# Patient Record
Sex: FEMALE | Race: WHITE | Employment: OTHER | URBAN - METROPOLITAN AREA
[De-identification: names, ages, dates, MRNs, and addresses within clinical notes are randomized per-mention and may not be internally consistent; named-entity substitution may affect disease eponyms.]

---

## 2018-04-04 ENCOUNTER — HOSPITAL ENCOUNTER (EMERGENCY)
Age: 83
Discharge: HOME | End: 2018-04-04
Attending: EMERGENCY MEDICINE
Payer: MEDICARE

## 2018-04-04 ENCOUNTER — HOSPITAL ENCOUNTER (EMERGENCY)
Age: 83
Discharge: HOME | End: 2018-04-04
Payer: MEDICARE

## 2018-04-04 ENCOUNTER — APPOINTMENT (OUTPATIENT)
Dept: GENERAL RADIOLOGY | Age: 83
End: 2018-04-04
Payer: MEDICARE

## 2018-04-04 VITALS
HEART RATE: 62 BPM | DIASTOLIC BLOOD PRESSURE: 65 MMHG | SYSTOLIC BLOOD PRESSURE: 158 MMHG | TEMPERATURE: 97.8 F | OXYGEN SATURATION: 94 % | RESPIRATION RATE: 20 BRPM

## 2018-04-04 VITALS
WEIGHT: 124 LBS | SYSTOLIC BLOOD PRESSURE: 144 MMHG | BODY MASS INDEX: 19.93 KG/M2 | HEIGHT: 66 IN | DIASTOLIC BLOOD PRESSURE: 77 MMHG | TEMPERATURE: 98.4 F | HEART RATE: 56 BPM | OXYGEN SATURATION: 92 % | RESPIRATION RATE: 17 BRPM

## 2018-04-04 DIAGNOSIS — R07.9 CHEST PAIN, UNSPECIFIED TYPE: Primary | ICD-10-CM

## 2018-04-04 DIAGNOSIS — R53.83 FATIGUE, UNSPECIFIED TYPE: ICD-10-CM

## 2018-04-04 LAB
ALBUMIN SERPL-MCNC: 3.9 G/DL (ref 3.5–5.2)
ALP BLD-CCNC: 77 U/L (ref 35–104)
ALT SERPL-CCNC: 13 U/L (ref 0–32)
ANION GAP SERPL CALCULATED.3IONS-SCNC: 10 MMOL/L (ref 7–16)
AST SERPL-CCNC: 15 U/L (ref 0–31)
BACTERIA: NORMAL /HPF
BASOPHILS ABSOLUTE: 0.02 E9/L (ref 0–0.2)
BASOPHILS RELATIVE PERCENT: 0.3 % (ref 0–2)
BILIRUB SERPL-MCNC: 0.3 MG/DL (ref 0–1.2)
BILIRUBIN URINE: NEGATIVE
BLOOD, URINE: NEGATIVE
BUN BLDV-MCNC: 13 MG/DL (ref 8–23)
CALCIUM SERPL-MCNC: 9.2 MG/DL (ref 8.6–10.2)
CHLORIDE BLD-SCNC: 103 MMOL/L (ref 98–107)
CLARITY: CLEAR
CO2: 28 MMOL/L (ref 22–29)
COLOR: YELLOW
CREAT SERPL-MCNC: 0.6 MG/DL (ref 0.5–1)
EKG ATRIAL RATE: 65 BPM
EKG P AXIS: 74 DEGREES
EKG P-R INTERVAL: 138 MS
EKG Q-T INTERVAL: 430 MS
EKG QRS DURATION: 88 MS
EKG QTC CALCULATION (BAZETT): 447 MS
EKG R AXIS: 87 DEGREES
EKG T AXIS: 77 DEGREES
EKG VENTRICULAR RATE: 65 BPM
EOSINOPHILS ABSOLUTE: 0.14 E9/L (ref 0.05–0.5)
EOSINOPHILS RELATIVE PERCENT: 2.4 % (ref 0–6)
GFR AFRICAN AMERICAN: >60
GFR NON-AFRICAN AMERICAN: >60 ML/MIN/1.73
GLUCOSE BLD-MCNC: 79 MG/DL (ref 74–109)
GLUCOSE URINE: NEGATIVE MG/DL
HCT VFR BLD CALC: 40.7 % (ref 34–48)
HEMOGLOBIN: 13.1 G/DL (ref 11.5–15.5)
IMMATURE GRANULOCYTES #: 0.01 E9/L
IMMATURE GRANULOCYTES %: 0.2 % (ref 0–5)
KETONES, URINE: NEGATIVE MG/DL
LEUKOCYTE ESTERASE, URINE: ABNORMAL
LYMPHOCYTES ABSOLUTE: 1.3 E9/L (ref 1.5–4)
LYMPHOCYTES RELATIVE PERCENT: 22 % (ref 20–42)
MCH RBC QN AUTO: 28.8 PG (ref 26–35)
MCHC RBC AUTO-ENTMCNC: 32.2 % (ref 32–34.5)
MCV RBC AUTO: 89.5 FL (ref 80–99.9)
MONOCYTES ABSOLUTE: 0.45 E9/L (ref 0.1–0.95)
MONOCYTES RELATIVE PERCENT: 7.6 % (ref 2–12)
NEUTROPHILS ABSOLUTE: 3.98 E9/L (ref 1.8–7.3)
NEUTROPHILS RELATIVE PERCENT: 67.5 % (ref 43–80)
NITRITE, URINE: NEGATIVE
PDW BLD-RTO: 13.5 FL (ref 11.5–15)
PH UA: 7 (ref 5–9)
PLATELET # BLD: 148 E9/L (ref 130–450)
PMV BLD AUTO: 11.1 FL (ref 7–12)
POTASSIUM SERPL-SCNC: 4.2 MMOL/L (ref 3.5–5)
PROTEIN UA: NEGATIVE MG/DL
RBC # BLD: 4.55 E12/L (ref 3.5–5.5)
RBC UA: NORMAL /HPF (ref 0–2)
SODIUM BLD-SCNC: 141 MMOL/L (ref 132–146)
SPECIFIC GRAVITY UA: 1.01 (ref 1–1.03)
TOTAL PROTEIN: 6.5 G/DL (ref 6.4–8.3)
TROPONIN: <0.01 NG/ML (ref 0–0.03)
UROBILINOGEN, URINE: 0.2 E.U./DL
WBC # BLD: 5.9 E9/L (ref 4.5–11.5)
WBC UA: NORMAL /HPF (ref 0–5)

## 2018-04-04 PROCEDURE — 81001 URINALYSIS AUTO W/SCOPE: CPT

## 2018-04-04 PROCEDURE — 99212 OFFICE O/P EST SF 10 MIN: CPT

## 2018-04-04 PROCEDURE — 80053 COMPREHEN METABOLIC PANEL: CPT

## 2018-04-04 PROCEDURE — 71046 X-RAY EXAM CHEST 2 VIEWS: CPT

## 2018-04-04 PROCEDURE — 84484 ASSAY OF TROPONIN QUANT: CPT

## 2018-04-04 PROCEDURE — 99285 EMERGENCY DEPT VISIT HI MDM: CPT

## 2018-04-04 PROCEDURE — 36415 COLL VENOUS BLD VENIPUNCTURE: CPT

## 2018-04-04 PROCEDURE — 85025 COMPLETE CBC W/AUTO DIFF WBC: CPT

## 2018-04-04 PROCEDURE — 6370000000 HC RX 637 (ALT 250 FOR IP): Performed by: EMERGENCY MEDICINE

## 2018-04-04 RX ORDER — MAGNESIUM OXIDE 400 MG/1
400 TABLET ORAL DAILY
COMMUNITY

## 2018-04-04 RX ORDER — ASPIRIN 81 MG/1
324 TABLET, CHEWABLE ORAL ONCE
Status: COMPLETED | OUTPATIENT
Start: 2018-04-04 | End: 2018-04-04

## 2018-04-04 RX ORDER — NITROGLYCERIN 0.4 MG/1
0.4 TABLET SUBLINGUAL EVERY 5 MIN PRN
Status: DISCONTINUED | OUTPATIENT
Start: 2018-04-04 | End: 2018-04-04

## 2018-04-04 RX ADMIN — ASPIRIN 81 MG 324 MG: 81 TABLET ORAL at 16:02

## 2018-04-04 ASSESSMENT — PAIN SCALES - GENERAL
PAINLEVEL_OUTOF10: 5
PAINLEVEL_OUTOF10: 5

## 2018-04-04 ASSESSMENT — PAIN DESCRIPTION - DESCRIPTORS
DESCRIPTORS: HEAVINESS;PRESSURE
DESCRIPTORS: DISCOMFORT;TIGHTNESS

## 2018-04-04 ASSESSMENT — PAIN DESCRIPTION - LOCATION
LOCATION: CHEST
LOCATION: CHEST

## 2018-04-04 ASSESSMENT — ENCOUNTER SYMPTOMS
TROUBLE SWALLOWING: 0
SHORTNESS OF BREATH: 0
RHINORRHEA: 0
CHEST TIGHTNESS: 0
CONSTIPATION: 0
ABDOMINAL PAIN: 0
SORE THROAT: 0
BLOOD IN STOOL: 0
DIARRHEA: 1
NAUSEA: 0
COUGH: 0
WHEEZING: 0
VOMITING: 0

## 2018-04-04 ASSESSMENT — PAIN DESCRIPTION - PAIN TYPE
TYPE: ACUTE PAIN
TYPE: ACUTE PAIN

## 2018-04-04 ASSESSMENT — PAIN DESCRIPTION - FREQUENCY: FREQUENCY: CONTINUOUS

## 2018-04-04 ASSESSMENT — PAIN DESCRIPTION - PROGRESSION: CLINICAL_PROGRESSION: GRADUALLY WORSENING

## 2018-06-06 ENCOUNTER — OFFICE VISIT (OUTPATIENT)
Dept: FAMILY MEDICINE CLINIC | Age: 83
End: 2018-06-06
Payer: MEDICARE

## 2018-06-06 VITALS
OXYGEN SATURATION: 97 % | HEIGHT: 66 IN | BODY MASS INDEX: 20.03 KG/M2 | TEMPERATURE: 97.8 F | DIASTOLIC BLOOD PRESSURE: 76 MMHG | RESPIRATION RATE: 16 BRPM | WEIGHT: 124.6 LBS | SYSTOLIC BLOOD PRESSURE: 124 MMHG | HEART RATE: 61 BPM

## 2018-06-06 DIAGNOSIS — G89.29 CHRONIC LOW BACK PAIN, UNSPECIFIED BACK PAIN LATERALITY, WITH SCIATICA PRESENCE UNSPECIFIED: ICD-10-CM

## 2018-06-06 DIAGNOSIS — M54.5 CHRONIC LOW BACK PAIN, UNSPECIFIED BACK PAIN LATERALITY, WITH SCIATICA PRESENCE UNSPECIFIED: ICD-10-CM

## 2018-06-06 DIAGNOSIS — M25.551 RIGHT HIP PAIN: Primary | ICD-10-CM

## 2018-06-06 DIAGNOSIS — R41.3 MEMORY LOSS: ICD-10-CM

## 2018-06-06 PROBLEM — R63.4 ABNORMAL WEIGHT LOSS: Status: ACTIVE | Noted: 2017-01-19

## 2018-06-06 PROBLEM — F32.1 MODERATE SINGLE CURRENT EPISODE OF MAJOR DEPRESSIVE DISORDER (HCC): Status: ACTIVE | Noted: 2017-01-19

## 2018-06-06 PROCEDURE — G8420 CALC BMI NORM PARAMETERS: HCPCS | Performed by: FAMILY MEDICINE

## 2018-06-06 PROCEDURE — G8427 DOCREV CUR MEDS BY ELIG CLIN: HCPCS | Performed by: FAMILY MEDICINE

## 2018-06-06 PROCEDURE — 99214 OFFICE O/P EST MOD 30 MIN: CPT | Performed by: FAMILY MEDICINE

## 2018-06-06 PROCEDURE — 1036F TOBACCO NON-USER: CPT | Performed by: FAMILY MEDICINE

## 2018-06-06 PROCEDURE — 4040F PNEUMOC VAC/ADMIN/RCVD: CPT | Performed by: FAMILY MEDICINE

## 2018-06-06 PROCEDURE — 1090F PRES/ABSN URINE INCON ASSESS: CPT | Performed by: FAMILY MEDICINE

## 2018-06-06 PROCEDURE — 1123F ACP DISCUSS/DSCN MKR DOCD: CPT | Performed by: FAMILY MEDICINE

## 2018-06-06 RX ORDER — MEMANTINE HYDROCHLORIDE 5 MG/1
5 TABLET ORAL DAILY
COMMUNITY
Start: 2018-05-17 | End: 2018-06-06

## 2018-06-06 RX ORDER — MEMANTINE HYDROCHLORIDE 5 MG/1
5 TABLET ORAL 2 TIMES DAILY
Qty: 60 TABLET | Refills: 3 | Status: SHIPPED | OUTPATIENT
Start: 2018-06-06 | End: 2018-10-29 | Stop reason: SDUPTHER

## 2018-06-06 ASSESSMENT — PATIENT HEALTH QUESTIONNAIRE - PHQ9
SUM OF ALL RESPONSES TO PHQ9 QUESTIONS 1 & 2: 2
SUM OF ALL RESPONSES TO PHQ QUESTIONS 1-9: 2
2. FEELING DOWN, DEPRESSED OR HOPELESS: 1
1. LITTLE INTEREST OR PLEASURE IN DOING THINGS: 1

## 2018-06-06 ASSESSMENT — ENCOUNTER SYMPTOMS
COUGH: 1
EYE PAIN: 0
SORE THROAT: 0
SHORTNESS OF BREATH: 0

## 2018-07-05 ENCOUNTER — TELEPHONE (OUTPATIENT)
Dept: FAMILY MEDICINE CLINIC | Age: 83
End: 2018-07-05

## 2018-07-06 ENCOUNTER — OFFICE VISIT (OUTPATIENT)
Dept: FAMILY MEDICINE CLINIC | Age: 83
End: 2018-07-06
Payer: MEDICARE

## 2018-07-06 VITALS
DIASTOLIC BLOOD PRESSURE: 68 MMHG | BODY MASS INDEX: 19.85 KG/M2 | SYSTOLIC BLOOD PRESSURE: 124 MMHG | RESPIRATION RATE: 20 BRPM | HEART RATE: 54 BPM | WEIGHT: 123 LBS | OXYGEN SATURATION: 96 %

## 2018-07-06 DIAGNOSIS — M54.5 LOW BACK PAIN, UNSPECIFIED BACK PAIN LATERALITY, UNSPECIFIED CHRONICITY, WITH SCIATICA PRESENCE UNSPECIFIED: ICD-10-CM

## 2018-07-06 DIAGNOSIS — R41.3 MEMORY LOSS: ICD-10-CM

## 2018-07-06 DIAGNOSIS — F32.1 MODERATE SINGLE CURRENT EPISODE OF MAJOR DEPRESSIVE DISORDER (HCC): ICD-10-CM

## 2018-07-06 DIAGNOSIS — M25.551 RIGHT HIP PAIN: Primary | ICD-10-CM

## 2018-07-06 PROCEDURE — G8427 DOCREV CUR MEDS BY ELIG CLIN: HCPCS | Performed by: FAMILY MEDICINE

## 2018-07-06 PROCEDURE — 1036F TOBACCO NON-USER: CPT | Performed by: FAMILY MEDICINE

## 2018-07-06 PROCEDURE — 99213 OFFICE O/P EST LOW 20 MIN: CPT | Performed by: FAMILY MEDICINE

## 2018-07-06 PROCEDURE — 1123F ACP DISCUSS/DSCN MKR DOCD: CPT | Performed by: FAMILY MEDICINE

## 2018-07-06 PROCEDURE — 4040F PNEUMOC VAC/ADMIN/RCVD: CPT | Performed by: FAMILY MEDICINE

## 2018-07-06 PROCEDURE — 1090F PRES/ABSN URINE INCON ASSESS: CPT | Performed by: FAMILY MEDICINE

## 2018-07-06 PROCEDURE — 1101F PT FALLS ASSESS-DOCD LE1/YR: CPT | Performed by: FAMILY MEDICINE

## 2018-07-06 PROCEDURE — G8420 CALC BMI NORM PARAMETERS: HCPCS | Performed by: FAMILY MEDICINE

## 2018-07-06 RX ORDER — MIRTAZAPINE 15 MG/1
15 TABLET, ORALLY DISINTEGRATING ORAL NIGHTLY
Qty: 30 TABLET | Refills: 3 | Status: SHIPPED | OUTPATIENT
Start: 2018-07-06 | End: 2018-09-04

## 2018-07-06 ASSESSMENT — ENCOUNTER SYMPTOMS
VOMITING: 0
CONSTIPATION: 0
ABDOMINAL PAIN: 0
SHORTNESS OF BREATH: 0
NAUSEA: 0
WHEEZING: 0
COUGH: 0
DIARRHEA: 0

## 2018-07-06 NOTE — PROGRESS NOTES
Neurological: She is alert and oriented to person, place, and time. She has normal reflexes. She displays normal reflexes. No cranial nerve deficit. Coordination normal.   Skin: She is not diaphoretic. Bruise on the left side of her head. Nursing note and vitals reviewed. ASSESSMENT/PLAN  Emilee was seen today for fall and hand injury. Diagnoses and all orders for this visit:    Right hip pain  -     GENERAL [facility]- Monica Mir,     Low back pain, unspecified back pain laterality, unspecified chronicity, with sciatica presence unspecified  -     GENERAL [facility]- Cordelia Guillen, DO    Moderate single current episode of major depressive disorder (HCC)  -     mirtazapine (REMERON SOL-TAB) 15 MG disintegrating tablet; Take 1 tablet by mouth nightly    Memory loss  -     [address]            Phone/MyChart follow up if tests abnormal.    Return in about 4 weeks (around 8/3/2018) for memory loss . or sooner if necessary. I have reviewed my findings and recommendations with Emilee. Evita Alberto.  Alison Lucero M.D

## 2018-07-10 ENCOUNTER — TELEPHONE (OUTPATIENT)
Dept: SPEECH THERAPY | Age: 83
End: 2018-07-10

## 2018-07-11 ENCOUNTER — EVALUATION (OUTPATIENT)
Dept: SPEECH THERAPY | Age: 83
End: 2018-07-11
Payer: MEDICARE

## 2018-07-11 DIAGNOSIS — R41.841 COGNITIVE COMMUNICATION DEFICIT: Primary | ICD-10-CM

## 2018-07-11 PROCEDURE — G9169 MEMORY GOAL STATUS: HCPCS | Performed by: SPEECH-LANGUAGE PATHOLOGIST

## 2018-07-11 PROCEDURE — 92523 SPEECH SOUND LANG COMPREHEN: CPT | Performed by: SPEECH-LANGUAGE PATHOLOGIST

## 2018-07-11 PROCEDURE — G9168 MEMORY CURRENT STATUS: HCPCS | Performed by: SPEECH-LANGUAGE PATHOLOGIST

## 2018-07-12 NOTE — PROGRESS NOTES
encounter. Patient Active Problem List   Diagnosis    Abnormal weight loss    Chronic airway obstruction (HCC)    Memory loss    Moderate single current episode of major depressive disorder ([address]    Posterior vitreous detachment    Senile nuclear sclerosis    Ventricular ectopy     Huma Acosta. Dereck Mcdaniel MA/[facility]-SLP  [id number]        I CERTIFY THAT THIS EVALUATION AND PLAN OF CARE FOR SPEECH THERAPY SERVICES ARE APPROPRIATE AND MEDICALLY NECESSARY.     DURATION:  FROM:___________07/11/2018____________THRU________10/10/2018________________              ________________________________________________________________________  Dustin Juniper                                                                         DATE

## 2018-07-17 ENCOUNTER — EVALUATION (OUTPATIENT)
Dept: SPEECH THERAPY | Age: 83
End: 2018-07-17
Payer: MEDICARE

## 2018-07-17 DIAGNOSIS — R41.841 COGNITIVE COMMUNICATION DEFICIT: Primary | ICD-10-CM

## 2018-07-17 PROCEDURE — 92507 TX SP LANG VOICE COMM INDIV: CPT | Performed by: SPEECH-LANGUAGE PATHOLOGIST

## 2018-07-17 NOTE — PROGRESS NOTES
Patient seen for 30 minute session this date with daughter present. We reviewed and agreed upon goals since this is her first session since eval.  Today, we discussed starting a memory journal to assist with recall. She appeared open to the idea but reports that she has little time to focus on herself due to the needs of her house and her . He daughter offered a suggestion to work on her brain exercises as part of her day and will try to implement. We worked on recall of a list of 10 words with association of attributes. She completed the task with 80-90% accuracy. She was please at her performance. Homework sent. Will continue. Isaías Valadez.  Leila Gregory MA/[facility]-SLP  [id number]

## 2018-07-19 ENCOUNTER — EVALUATION (OUTPATIENT)
Dept: SPEECH THERAPY | Age: 83
End: 2018-07-19
Payer: MEDICARE

## 2018-07-19 DIAGNOSIS — R41.841 COGNITIVE COMMUNICATION DEFICIT: Primary | ICD-10-CM

## 2018-07-19 PROCEDURE — 92507 TX SP LANG VOICE COMM INDIV: CPT | Performed by: SPEECH-LANGUAGE PATHOLOGIST

## 2018-07-24 ENCOUNTER — EVALUATION (OUTPATIENT)
Dept: SPEECH THERAPY | Age: 83
End: 2018-07-24
Payer: MEDICARE

## 2018-07-24 DIAGNOSIS — R41.841 COGNITIVE COMMUNICATION DEFICIT: Primary | ICD-10-CM

## 2018-07-24 PROCEDURE — 92507 TX SP LANG VOICE COMM INDIV: CPT | Performed by: SPEECH-LANGUAGE PATHOLOGIST

## 2018-07-26 ENCOUNTER — TELEPHONE (OUTPATIENT)
Dept: SPEECH THERAPY | Age: 83
End: 2018-07-26

## 2018-07-26 NOTE — TELEPHONE ENCOUNTER
Patients daughter called to cancel todays appointment. Annel Dewey.  Tiffany Orosco MA/[facility]-SLP  [id number]

## 2018-07-27 ENCOUNTER — OFFICE VISIT (OUTPATIENT)
Dept: FAMILY MEDICINE CLINIC | Age: 83
End: 2018-07-27
Payer: MEDICARE

## 2018-07-27 ENCOUNTER — HOSPITAL ENCOUNTER (OUTPATIENT)
Age: 83
Discharge: HOME | End: 2018-07-29
Payer: MEDICARE

## 2018-07-27 VITALS
OXYGEN SATURATION: 98 % | WEIGHT: 124 LBS | DIASTOLIC BLOOD PRESSURE: 90 MMHG | RESPIRATION RATE: 20 BRPM | BODY MASS INDEX: 20.01 KG/M2 | HEART RATE: 60 BPM | SYSTOLIC BLOOD PRESSURE: 138 MMHG

## 2018-07-27 DIAGNOSIS — R10.9 RIGHT FLANK PAIN: ICD-10-CM

## 2018-07-27 DIAGNOSIS — M54.9 MID BACK PAIN ON RIGHT SIDE: ICD-10-CM

## 2018-07-27 DIAGNOSIS — M54.9 MID BACK PAIN ON RIGHT SIDE: Primary | ICD-10-CM

## 2018-07-27 LAB
BILIRUBIN, POC: NEGATIVE
BLOOD URINE, POC: NEGATIVE
CLARITY, POC: CLEAR
COLOR, POC: YELLOW
GLUCOSE URINE, POC: NEGATIVE
HCT VFR BLD CALC: 41.4 % (ref 34–48)
HEMOGLOBIN: 13.3 G/DL (ref 11.5–15.5)
KETONES, POC: NEGATIVE
LEUKOCYTE EST, POC: NORMAL
MCH RBC QN AUTO: 29.1 PG (ref 26–35)
MCHC RBC AUTO-ENTMCNC: 32.1 % (ref 32–34.5)
MCV RBC AUTO: 90.6 FL (ref 80–99.9)
NITRITE, POC: NEGATIVE
PDW BLD-RTO: 13.7 FL (ref 11.5–15)
PH, POC: 7.5
PLATELET # BLD: 150 E9/L (ref 130–450)
PMV BLD AUTO: 12.7 FL (ref 7–12)
PROTEIN, POC: NEGATIVE
RBC # BLD: 4.57 E12/L (ref 3.5–5.5)
SPECIFIC GRAVITY, POC: 1.01
UROBILINOGEN, POC: 0.2
WBC # BLD: 6.4 E9/L (ref 4.5–11.5)

## 2018-07-27 PROCEDURE — G8420 CALC BMI NORM PARAMETERS: HCPCS | Performed by: FAMILY MEDICINE

## 2018-07-27 PROCEDURE — G8427 DOCREV CUR MEDS BY ELIG CLIN: HCPCS | Performed by: FAMILY MEDICINE

## 2018-07-27 PROCEDURE — 80048 BASIC METABOLIC PNL TOTAL CA: CPT

## 2018-07-27 PROCEDURE — 4040F PNEUMOC VAC/ADMIN/RCVD: CPT | Performed by: FAMILY MEDICINE

## 2018-07-27 PROCEDURE — 1101F PT FALLS ASSESS-DOCD LE1/YR: CPT | Performed by: FAMILY MEDICINE

## 2018-07-27 PROCEDURE — 81003 URINALYSIS AUTO W/O SCOPE: CPT | Performed by: FAMILY MEDICINE

## 2018-07-27 PROCEDURE — 81003 URINALYSIS AUTO W/O SCOPE: CPT

## 2018-07-27 PROCEDURE — 36415 COLL VENOUS BLD VENIPUNCTURE: CPT

## 2018-07-27 PROCEDURE — 1090F PRES/ABSN URINE INCON ASSESS: CPT | Performed by: FAMILY MEDICINE

## 2018-07-27 PROCEDURE — 99213 OFFICE O/P EST LOW 20 MIN: CPT | Performed by: FAMILY MEDICINE

## 2018-07-27 PROCEDURE — 85027 COMPLETE CBC AUTOMATED: CPT

## 2018-07-27 PROCEDURE — 1123F ACP DISCUSS/DSCN MKR DOCD: CPT | Performed by: FAMILY MEDICINE

## 2018-07-27 PROCEDURE — 87088 URINE BACTERIA CULTURE: CPT

## 2018-07-27 PROCEDURE — 1036F TOBACCO NON-USER: CPT | Performed by: FAMILY MEDICINE

## 2018-07-27 RX ORDER — OXYCODONE AND ACETAMINOPHEN 2.5; 325 MG/1; MG/1
1 TABLET ORAL EVERY 8 HOURS PRN
Qty: 21 TABLET | Refills: 0 | Status: SHIPPED | OUTPATIENT
Start: 2018-07-27 | End: 2018-08-02 | Stop reason: DRUGHIGH

## 2018-07-27 RX ORDER — MELOXICAM 7.5 MG/1
7.5 TABLET ORAL DAILY
Qty: 30 TABLET | Refills: 3 | Status: SHIPPED | OUTPATIENT
Start: 2018-07-27 | End: 2018-08-03 | Stop reason: ALTCHOICE

## 2018-07-27 RX ORDER — CYCLOBENZAPRINE HCL 5 MG
5 TABLET ORAL 3 TIMES DAILY PRN
Qty: 21 TABLET | Refills: 0 | Status: SHIPPED | OUTPATIENT
Start: 2018-07-27 | End: 2018-08-02 | Stop reason: SDUPTHER

## 2018-07-27 ASSESSMENT — ENCOUNTER SYMPTOMS
VOMITING: 0
DIARRHEA: 0
COUGH: 0
WHEEZING: 0
NAUSEA: 0
ABDOMINAL PAIN: 0
BACK PAIN: 1
SHORTNESS OF BREATH: 0

## 2018-07-28 LAB
ANION GAP SERPL CALCULATED.3IONS-SCNC: 11 MMOL/L (ref 7–16)
BILIRUBIN URINE: NEGATIVE
BLOOD, URINE: NEGATIVE
BUN BLDV-MCNC: 15 MG/DL (ref 8–23)
CALCIUM SERPL-MCNC: 9.6 MG/DL (ref 8.6–10.2)
CHLORIDE BLD-SCNC: 103 MMOL/L (ref 98–107)
CLARITY: CLEAR
CO2: 29 MMOL/L (ref 22–29)
COLOR: YELLOW
CREAT SERPL-MCNC: 0.7 MG/DL (ref 0.5–1)
GFR AFRICAN AMERICAN: >60
GFR NON-AFRICAN AMERICAN: >60 ML/MIN/1.73
GLUCOSE BLD-MCNC: 84 MG/DL (ref 74–109)
GLUCOSE URINE: NEGATIVE MG/DL
KETONES, URINE: NEGATIVE MG/DL
LEUKOCYTE ESTERASE, URINE: NEGATIVE
NITRITE, URINE: NEGATIVE
PH UA: 8 (ref 5–9)
POTASSIUM SERPL-SCNC: 4.7 MMOL/L (ref 3.5–5)
PROTEIN UA: NEGATIVE MG/DL
SODIUM BLD-SCNC: 143 MMOL/L (ref 132–146)
SPECIFIC GRAVITY UA: 1.01 (ref 1–1.03)
UROBILINOGEN, URINE: 0.2 E.U./DL

## 2018-07-30 LAB — URINE CULTURE, ROUTINE: NORMAL

## 2018-08-01 ENCOUNTER — OFFICE VISIT (OUTPATIENT)
Dept: PHYSICAL MEDICINE AND REHAB | Age: 83
End: 2018-08-01
Payer: MEDICARE

## 2018-08-01 VITALS
HEART RATE: 66 BPM | SYSTOLIC BLOOD PRESSURE: 128 MMHG | HEIGHT: 67 IN | BODY MASS INDEX: 19.78 KG/M2 | DIASTOLIC BLOOD PRESSURE: 65 MMHG | WEIGHT: 126 LBS

## 2018-08-01 DIAGNOSIS — M54.9 MID BACK PAIN: ICD-10-CM

## 2018-08-01 DIAGNOSIS — M40.14 OTHER SECONDARY KYPHOSIS, THORACIC REGION: ICD-10-CM

## 2018-08-01 DIAGNOSIS — R07.81 RIB PAIN: Primary | ICD-10-CM

## 2018-08-01 DIAGNOSIS — M81.0 AGE RELATED OSTEOPOROSIS, UNSPECIFIED PATHOLOGICAL FRACTURE PRESENCE: ICD-10-CM

## 2018-08-01 PROCEDURE — G8420 CALC BMI NORM PARAMETERS: HCPCS | Performed by: PHYSICAL MEDICINE & REHABILITATION

## 2018-08-01 PROCEDURE — 1123F ACP DISCUSS/DSCN MKR DOCD: CPT | Performed by: PHYSICAL MEDICINE & REHABILITATION

## 2018-08-01 PROCEDURE — 99204 OFFICE O/P NEW MOD 45 MIN: CPT | Performed by: PHYSICAL MEDICINE & REHABILITATION

## 2018-08-01 PROCEDURE — G8427 DOCREV CUR MEDS BY ELIG CLIN: HCPCS | Performed by: PHYSICAL MEDICINE & REHABILITATION

## 2018-08-01 PROCEDURE — 4040F PNEUMOC VAC/ADMIN/RCVD: CPT | Performed by: PHYSICAL MEDICINE & REHABILITATION

## 2018-08-01 PROCEDURE — 1036F TOBACCO NON-USER: CPT | Performed by: PHYSICAL MEDICINE & REHABILITATION

## 2018-08-01 PROCEDURE — 1090F PRES/ABSN URINE INCON ASSESS: CPT | Performed by: PHYSICAL MEDICINE & REHABILITATION

## 2018-08-01 PROCEDURE — 1101F PT FALLS ASSESS-DOCD LE1/YR: CPT | Performed by: PHYSICAL MEDICINE & REHABILITATION

## 2018-08-01 RX ORDER — LIDOCAINE 50 MG/G
3 PATCH TOPICAL EVERY 12 HOURS
Qty: 90 PATCH | Refills: 0 | Status: SHIPPED | OUTPATIENT
Start: 2018-08-01 | End: 2018-08-03

## 2018-08-01 NOTE — PATIENT INSTRUCTIONS
We appreciate that you chose [facility] to provide your healthcare needs today. We took great pleasure in caring for you. You may receive a survey to help us learn how to make your next visit to a [facility] facility better than the last.   Your feedback is important to help us continually improve the service we can provide you. Please take the time to complete it thoughtfully if you receive one as we value the feedback in every survey. In this survey we would appreciate that you answer \"always\" or \"yes\" for as many questions as possible (this is the answer we get credit for doing a good job). If you feel there is a question you cannot answer \"always\" or \"yes\", please let us know before you leave today so we can remedy the situation right away. We look forward to continuing to provide you with excellent care. Considering the survey questions related to access to care, please keep in mind the urgency of your problem (i.e. was it an emergent or urgent visit or more routine)  and whether the urgency of that problem was handled appropriately by our office. We always do our best to prioritize the patients who need the care most urgently given our specialty is in short supply and there is a high demand for visits. Thank you for your time and consideration.

## 2018-08-01 NOTE — PROGRESS NOTES
Lymphatic: There is no cervical or inguinal lymphadenopathy. Gastrointestinal: Soft abdomen, non-tender. No pulsating abdominal mass. Genitourinary: No costovertebral angle tenderness. MSK: There is no joint effusion, deformity, instability, swelling, erythema or warmth. AROM is full in the spine and extremities. Spinal curvatures reveal severely increased thoracic kyphosis. Exquisite tenderness R mid ribs posteriorly, laterally and anteriorly as well as mid and lower thoracic paraspinals. Vibratory sensation causes exquisite pain at T4 and T8. [facility]: Awake, alert and oriented in three planes. Speech is fluent. No facial weakness. Tongue is midline. Hearing is intact for conversation. Pupils are equal and round. Extraocular muscles are intact. Hearing is intact for conversation. Shoulder shrug symmetric. Sensation: Intact for light touch and pin prick in all upper and lower extremity dermatomes. Vibration and proprioception are intact at the bilateral first MTP. Strength: 5/5 in all myotomes in the upper and lower extremities. Muscle Tendon Reflexes: 2+ symmetric in the bilateral upper and lower extremities. Babinski is downgoing bilaterally. Claretta Potash is negative bilaterally. Gait is antalgic   Romberg is negative. Heel and toe walk are normal.  Tandem walk is normal.  No clonus or spasticity. The patient was able to rise from a chair and squat without difficulty. There is no tremor. Impression:   1. Rib pain    2. Mid back pain    3. Other secondary kyphosis, thoracic region    4.  Age related osteoporosis, unspecified pathological fracture presence        Plan:   Orders Placed This Encounter   Procedures    MRI Thoracic Spine WO Contrast     Standing Status:   Future     Standing Expiration Date:   8/2/2019     Order Specific Question:   Reason for exam:     Answer:   back pain    XR RIBS RIGHT INCLUDE CHEST (MIN 3 VIEWS)     Standing Status:   Future     Number of Occurrences:   1 Standing Expiration Date:   8/1/2019     The patient was educated about the diagnosis, prognosis, indications, risks and benefits of treatment. An opportunity to ask questions was given to the patient and questions were answered. The patient agreed to proceed with the recommended treatment as described above. Follow up after MRI     Thank you for the consultation and for allowing me to participate in the care of this patient. Sincerely,         aRmy Hutchison D.O., P.T.   Board Certified Physical Medicine and Rehabilitation  Board Certified Electrodiagnostic Medicine

## 2018-08-02 ENCOUNTER — TELEPHONE (OUTPATIENT)
Dept: PHYSICAL MEDICINE AND REHAB | Age: 83
End: 2018-08-02

## 2018-08-02 DIAGNOSIS — M54.9 MID BACK PAIN ON RIGHT SIDE: ICD-10-CM

## 2018-08-02 DIAGNOSIS — S22.000A COMPRESSION FRACTURE OF BODY OF THORACIC VERTEBRA (HCC): Primary | ICD-10-CM

## 2018-08-02 DIAGNOSIS — R10.9 RIGHT FLANK PAIN: ICD-10-CM

## 2018-08-02 RX ORDER — OXYCODONE HYDROCHLORIDE AND ACETAMINOPHEN 5; 325 MG/1; MG/1
1 TABLET ORAL EVERY 8 HOURS PRN
Qty: 42 TABLET | Refills: 0 | Status: SHIPPED | OUTPATIENT
Start: 2018-08-02 | End: 2018-08-16

## 2018-08-02 RX ORDER — CYCLOBENZAPRINE HCL 5 MG
5 TABLET ORAL 3 TIMES DAILY PRN
Qty: 21 TABLET | Refills: 0 | Status: SHIPPED | OUTPATIENT
Start: 2018-08-02 | End: 2018-08-09

## 2018-08-02 NOTE — TELEPHONE ENCOUNTER
----- Message from Jyothi Pinedo DO sent at 8/2/2018  2:34 PM EDT -----  Reviewed test abnormal, inform patient that it showed hiatal hernia which I think is incidental. No rib fractures. Await Thoracic MRI if normal may have her see someone for the hernia.

## 2018-08-02 NOTE — TELEPHONE ENCOUNTER
Spoke with patient daughter Diamond Lomas, informed her rib XR is normal.  She states MRI is scheduled.

## 2018-08-02 NOTE — TELEPHONE ENCOUNTER
----- Message from Margot Harden DO sent at 8/2/2018  1:50 PM EDT -----  Please check with patient's daughter how they have been treating her osteoporosis?  I only see vitamin D.  RIb Xrays were normal.

## 2018-08-03 DIAGNOSIS — M54.6 ACUTE MIDLINE THORACIC BACK PAIN: Primary | ICD-10-CM

## 2018-08-03 NOTE — PROGRESS NOTES
Insurance denied Lidoderm. I placed Rx for Flector patch but she cannot take the Mobic with it. If they approve the Flector she will need to stop the Mobic.

## 2018-08-07 ENCOUNTER — TELEPHONE (OUTPATIENT)
Dept: PHYSICAL MEDICINE AND REHAB | Age: 83
End: 2018-08-07

## 2018-08-07 DIAGNOSIS — R07.81 RIB PAIN: ICD-10-CM

## 2018-08-07 DIAGNOSIS — K44.9 HIATAL HERNIA: ICD-10-CM

## 2018-08-07 DIAGNOSIS — K76.9 LIVER LESION: Primary | ICD-10-CM

## 2018-08-09 ENCOUNTER — OFFICE VISIT (OUTPATIENT)
Dept: FAMILY MEDICINE CLINIC | Age: 83
End: 2018-08-09
Payer: MEDICARE

## 2018-08-09 VITALS
HEART RATE: 64 BPM | RESPIRATION RATE: 18 BRPM | DIASTOLIC BLOOD PRESSURE: 74 MMHG | OXYGEN SATURATION: 96 % | SYSTOLIC BLOOD PRESSURE: 120 MMHG | HEIGHT: 67 IN | WEIGHT: 128 LBS | BODY MASS INDEX: 20.09 KG/M2

## 2018-08-09 DIAGNOSIS — F32.1 MODERATE SINGLE CURRENT EPISODE OF MAJOR DEPRESSIVE DISORDER (HCC): Primary | ICD-10-CM

## 2018-08-09 DIAGNOSIS — M54.9 MID BACK PAIN ON RIGHT SIDE: ICD-10-CM

## 2018-08-09 PROCEDURE — G8427 DOCREV CUR MEDS BY ELIG CLIN: HCPCS | Performed by: FAMILY MEDICINE

## 2018-08-09 PROCEDURE — 1123F ACP DISCUSS/DSCN MKR DOCD: CPT | Performed by: FAMILY MEDICINE

## 2018-08-09 PROCEDURE — G8420 CALC BMI NORM PARAMETERS: HCPCS | Performed by: FAMILY MEDICINE

## 2018-08-09 PROCEDURE — 1090F PRES/ABSN URINE INCON ASSESS: CPT | Performed by: FAMILY MEDICINE

## 2018-08-09 PROCEDURE — 4040F PNEUMOC VAC/ADMIN/RCVD: CPT | Performed by: FAMILY MEDICINE

## 2018-08-09 PROCEDURE — 1036F TOBACCO NON-USER: CPT | Performed by: FAMILY MEDICINE

## 2018-08-09 PROCEDURE — 1101F PT FALLS ASSESS-DOCD LE1/YR: CPT | Performed by: FAMILY MEDICINE

## 2018-08-09 PROCEDURE — 99213 OFFICE O/P EST LOW 20 MIN: CPT | Performed by: FAMILY MEDICINE

## 2018-08-09 ASSESSMENT — PATIENT HEALTH QUESTIONNAIRE - PHQ9
SUM OF ALL RESPONSES TO PHQ QUESTIONS 1-9: 0
SUM OF ALL RESPONSES TO PHQ9 QUESTIONS 1 & 2: 0
2. FEELING DOWN, DEPRESSED OR HOPELESS: 0
SUM OF ALL RESPONSES TO PHQ QUESTIONS 1-9: 0
1. LITTLE INTEREST OR PLEASURE IN DOING THINGS: 0

## 2018-08-09 NOTE — PROGRESS NOTES
[address] [place] [address]   [phone]   Ankita Rodriguez MD     Patient: Faby Montemayor  YOB: 1929  Visit Date: 8/9/18    Juvenal Rangel is a 80y.o. year old female here today for   Chief Complaint   Patient presents with    Memory Loss     f/u        HPI  Pain is improved with rest and medication. Has follow up scheduled with Dr. Jessica Trent  Has been taking oxycodone three times a day . Has thrown up a couple times since last week . Eating too fast.   That happens every once in a whlie.  is not doing well health wise currently. Patient is rushing to get home to be with him as he is very ill. Review of Systems   Constitutional: Negative for chills and fever. Respiratory: Negative for shortness of breath and wheezing. Cardiovascular: Negative for chest pain, palpitations and leg swelling. Musculoskeletal: Positive for back pain. Psychiatric/Behavioral: Positive for dysphoric mood. Current Outpatient Prescriptions on File Prior to Visit   Medication Sig Dispense Refill    diclofenac (FLECTOR) 1.3 % patch Place 1 patch onto the skin 2 times daily as needed (pain) 60 patch 2    oxyCODONE-acetaminophen (PERCOCET) 5-325 MG per tablet Take 1 tablet by mouth every 8 hours as needed for Pain for up to 14 days. . 42 tablet 0    mirtazapine (REMERON SOL-TAB) 15 MG disintegrating tablet Take 1 tablet by mouth nightly 30 tablet 3    memantine (NAMENDA) 5 MG tablet Take 1 tablet by mouth 2 times daily 60 tablet 3    magnesium oxide (MAG-OX) 400 MG tablet Take 400 mg by mouth daily      Cyanocobalamin (VITAMIN B 12 PO) Take by mouth      vitamin D (CHOLECALCIFEROL) 1000 UNIT TABS tablet Take 1,000 Units by mouth nightly      Biotin w/ Vitamins C & E (HAIR/SKIN/NAILS PO) Take by mouth      guaiFENesin (MUCINEX) 600 MG extended release tablet Take 600 mg by mouth daily as needed       Multiple Vitamins-Minerals (MULTIVITAMIN ADULT PO) Take by mouth      azelastine (ASTELIN) 0.1 % nasal spray 2 sprays by Nasal route daily Use in each nostril as directed 1 Bottle 3    omeprazole (PRILOSEC) 20 MG delayed release capsule Take 20 mg by mouth daily      aspirin 81 MG chewable tablet Take 81 mg by mouth daily       No current facility-administered medications on file prior to visit. Allergies   Allergen Reactions    Demerol Hcl [Meperidine]      Other reaction(s): Intolerance  \"went into shok\"    Statins Other (See Comments)       Past medical, surgical, social and/or family history reviewed, updated as needed, and are non-contributory (unless otherwise stated). Medications, allergies, and problem list also reviewed and updated as needed in patient's record. Wt Readings from Last 3 Encounters:   08/09/18 128 lb (58.1 kg)   08/01/18 126 lb (57.2 kg)   07/27/18 124 lb (56.2 kg)                   /74 (Site: Left Arm, Position: Sitting)   Pulse 64   Resp 18   Ht 5' 7\" (1.702 m)   Wt 128 lb (58.1 kg)   SpO2 96%   BMI 20.05 kg/m²       Physical Exam   Constitutional: She appears well-developed and well-nourished. No distress. HENT:   Head: Normocephalic and atraumatic. Cardiovascular: Normal rate, regular rhythm, normal heart sounds and intact distal pulses. No murmur heard. Pulmonary/Chest: Effort normal and breath sounds normal. No respiratory distress. She has no wheezes. She has no rales. Abdominal: Soft. Bowel sounds are normal. She exhibits no distension. There is no tenderness. There is no rebound and no guarding. Skin: She is not diaphoretic. Nursing note and vitals reviewed. ASSESSMENT/PLAN  Emilee was seen today for memory loss.     Diagnoses and all orders for this visit:    Moderate single current episode of major depressive disorder ([address]   - Will trial increase in mirtazapine to 30mg     Mid back pain on right side   - Improved with pain medication - tolerating pain medication well    - Following with Dr. Sheela Alfredo     Other orders  -     montelukast (SINGULAIR) 10 MG tablet; Take 1 tablet by mouth nightly        Phone/MyChart follow up if tests abnormal.    Return if symptoms worsen or fail to improve. or sooner if necessary. I have reviewed my findings and recommendations with Paolo Champion.  Pradeep Colin M.D

## 2018-08-12 RX ORDER — MONTELUKAST SODIUM 10 MG/1
10 TABLET ORAL NIGHTLY
Qty: 30 TABLET | Refills: 3 | Status: SHIPPED | OUTPATIENT
Start: 2018-08-12 | End: 2018-10-04 | Stop reason: SDUPTHER

## 2018-08-12 ASSESSMENT — ENCOUNTER SYMPTOMS
SHORTNESS OF BREATH: 0
BACK PAIN: 1
WHEEZING: 0

## 2018-08-30 ENCOUNTER — OFFICE VISIT (OUTPATIENT)
Dept: PHYSICAL MEDICINE AND REHAB | Age: 83
End: 2018-08-30
Payer: MEDICARE

## 2018-08-30 VITALS
WEIGHT: 122 LBS | DIASTOLIC BLOOD PRESSURE: 80 MMHG | BODY MASS INDEX: 19.15 KG/M2 | HEIGHT: 67 IN | HEART RATE: 92 BPM | OXYGEN SATURATION: 96 % | SYSTOLIC BLOOD PRESSURE: 122 MMHG

## 2018-08-30 DIAGNOSIS — M25.511 ACUTE PAIN OF RIGHT SHOULDER: Primary | ICD-10-CM

## 2018-08-30 DIAGNOSIS — M79.18 RIGHT BUTTOCK PAIN: ICD-10-CM

## 2018-08-30 DIAGNOSIS — M47.814 SPONDYLOSIS OF THORACIC REGION WITHOUT MYELOPATHY OR RADICULOPATHY: ICD-10-CM

## 2018-08-30 DIAGNOSIS — S22.000A COMPRESSION FRACTURE OF BODY OF THORACIC VERTEBRA (HCC): ICD-10-CM

## 2018-08-30 PROCEDURE — 1101F PT FALLS ASSESS-DOCD LE1/YR: CPT | Performed by: PHYSICAL MEDICINE & REHABILITATION

## 2018-08-30 PROCEDURE — G8427 DOCREV CUR MEDS BY ELIG CLIN: HCPCS | Performed by: PHYSICAL MEDICINE & REHABILITATION

## 2018-08-30 PROCEDURE — G8420 CALC BMI NORM PARAMETERS: HCPCS | Performed by: PHYSICAL MEDICINE & REHABILITATION

## 2018-08-30 PROCEDURE — 99214 OFFICE O/P EST MOD 30 MIN: CPT | Performed by: PHYSICAL MEDICINE & REHABILITATION

## 2018-08-30 PROCEDURE — 4040F PNEUMOC VAC/ADMIN/RCVD: CPT | Performed by: PHYSICAL MEDICINE & REHABILITATION

## 2018-08-30 PROCEDURE — 1036F TOBACCO NON-USER: CPT | Performed by: PHYSICAL MEDICINE & REHABILITATION

## 2018-08-30 PROCEDURE — 1123F ACP DISCUSS/DSCN MKR DOCD: CPT | Performed by: PHYSICAL MEDICINE & REHABILITATION

## 2018-08-30 PROCEDURE — 1090F PRES/ABSN URINE INCON ASSESS: CPT | Performed by: PHYSICAL MEDICINE & REHABILITATION

## 2018-08-30 RX ORDER — OXYCODONE HYDROCHLORIDE AND ACETAMINOPHEN 5; 325 MG/1; MG/1
1 TABLET ORAL PRN
COMMUNITY
End: 2018-09-14 | Stop reason: SDUPTHER

## 2018-08-30 RX ORDER — MELOXICAM 7.5 MG/1
7.5 TABLET ORAL DAILY
COMMUNITY
End: 2018-11-26 | Stop reason: SDUPTHER

## 2018-08-30 RX ORDER — OXYCODONE HYDROCHLORIDE AND ACETAMINOPHEN 5; 325 MG/1; MG/1
1 TABLET ORAL DAILY PRN
Qty: 30 TABLET | Refills: 0 | Status: SHIPPED | OUTPATIENT
Start: 2018-08-30 | End: 2018-09-14

## 2018-08-30 NOTE — PROGRESS NOTES
[facility]  [address] [facility] [place]  Phone: [phone]  Fax: [phone]      8/30/18    Chief Complaint   Patient presents with    Back Pain       HPI:  Tiffanie Schwab is a 80y.o. year old woman seen today in follow up regarding mid back pain. Interval history: Since the last visit the patient reports right shoulder pain when she moves her right arm, right buttock pain when she sits for long periods. Thoracic MRI was completed showed old compression fractures, mild spondylosis, hiatal hernia and liver lesion. Rib Xrays showed no fracture. Right midthoracic pain is unchanged and she has new right scapular and right buttock pain. Today, the pain is rated Pain Score:   1 (Yesterday Pain Rating = 9) where 0 is no pain and 10 is pain as bad as it can be. The pain is located in the right scapular region, right buttock,  does not radiate and is described as aching, sharp. This pain occurs intermittently. The symptoms have been unchanged since onset. Symptoms are exacerbated by sitting, turning, lifting right arm. Factors which relieve the pain include Percocet. Other associated symptoms include stiffness. Otherwise, the pain assessment has not changed since the last visit.      Past Medical History:   Diagnosis Date    Asthma     COPD (chronic obstructive pulmonary disease) ([address]     Depression     Hiatal hernia     Hyperlipidemia     Hypertension     PVC's (premature ventricular contractions)        Past Surgical History:   Procedure Laterality Date    CATARACT REMOVAL WITH IMPLANT      right eye    HYSTERECTOMY         Social History     Social History    Marital status:      Spouse name: N/A    Number of children: N/A    Years of education: N/A     Social History Main Topics    Smoking status: Never Smoker    Smokeless tobacco: Never Used    Alcohol use No    Drug use: No    Sexual activity: Not Asked     Other Topics Concern    None     Social History Narrative    None       History reviewed. No pertinent family history. Current Outpatient Prescriptions   Medication Sig Dispense Refill    meloxicam (MOBIC) 7.5 MG tablet Take 7.5 mg by mouth daily      oxyCODONE-acetaminophen (PERCOCET) 5-325 MG per tablet Take 1 tablet by mouth as needed for Pain. Juve Mckay oxyCODONE-acetaminophen (PERCOCET) 5-325 MG per tablet Take 1 tablet by mouth daily as needed for Pain for up to 30 days. Intended supply: 3 days. Take lowest dose possible to manage pain. Earliest Fill Date: 8/30/18 30 tablet 0    Elastic Bandages & Supports (LUMBAR BACK BRACE/SUPPORT PAD) MISC SI support 1 each 0    montelukast (SINGULAIR) 10 MG tablet Take 1 tablet by mouth nightly 30 tablet 3    diclofenac (FLECTOR) 1.3 % patch Place 1 patch onto the skin 2 times daily as needed (pain) 60 patch 2    mirtazapine (REMERON SOL-TAB) 15 MG disintegrating tablet Take 1 tablet by mouth nightly 30 tablet 3    memantine (NAMENDA) 5 MG tablet Take 1 tablet by mouth 2 times daily 60 tablet 3    magnesium oxide (MAG-OX) 400 MG tablet Take 400 mg by mouth daily      Cyanocobalamin (VITAMIN B 12 PO) Take by mouth      vitamin D (CHOLECALCIFEROL) 1000 UNIT TABS tablet Take 1,000 Units by mouth 2 times daily       Biotin w/ Vitamins C & E (HAIR/SKIN/NAILS PO) Take by mouth      guaiFENesin (MUCINEX) 600 MG extended release tablet Take 600 mg by mouth daily as needed       Multiple Vitamins-Minerals (MULTIVITAMIN ADULT PO) Take by mouth      azelastine (ASTELIN) 0.1 % nasal spray 2 sprays by Nasal route daily Use in each nostril as directed 1 Bottle 3    omeprazole (PRILOSEC) 20 MG delayed release capsule Take 20 mg by mouth daily      aspirin 81 MG chewable tablet Take 81 mg by mouth daily       No current facility-administered medications for this visit.         Allergies   Allergen Reactions    Demerol Hcl [Meperidine]      Other

## 2018-08-31 ENCOUNTER — TELEPHONE (OUTPATIENT)
Dept: PHYSICAL MEDICINE AND REHAB | Age: 83
End: 2018-08-31

## 2018-08-31 DIAGNOSIS — F32.1 MODERATE SINGLE CURRENT EPISODE OF MAJOR DEPRESSIVE DISORDER (HCC): ICD-10-CM

## 2018-08-31 RX ORDER — MIRTAZAPINE 30 MG/1
30 TABLET, FILM COATED ORAL NIGHTLY
Qty: 30 TABLET | Refills: 3 | Status: CANCELLED | OUTPATIENT
Start: 2018-08-31 | End: 2019-08-31

## 2018-08-31 NOTE — TELEPHONE ENCOUNTER
----- Message from Honey Colindres DO sent at 8/31/2018  9:26 AM EDT -----  Test results are normal please notify patient.

## 2018-08-31 NOTE — TELEPHONE ENCOUNTER
Patient's daughter  is requesting a medication refill on Remeron 30 mg. Dr Christina Dykes increased the medication from 15 mg to 30 mg on office visit 8/9/2018.  And patient is almost out of her medication    Patient last office visit 8/9/2018  Last medication refill 7/6/2018 with 3 refills  Order Pended  Next office visit schedule 11/29/2018

## 2018-09-04 RX ORDER — MIRTAZAPINE 30 MG/1
30 TABLET, FILM COATED ORAL NIGHTLY
Qty: 30 TABLET | Refills: 3 | Status: SHIPPED | OUTPATIENT
Start: 2018-09-04 | End: 2018-10-04 | Stop reason: SDUPTHER

## 2018-09-14 ENCOUNTER — OFFICE VISIT (OUTPATIENT)
Dept: FAMILY MEDICINE CLINIC | Age: 83
End: 2018-09-14
Payer: MEDICARE

## 2018-09-14 VITALS
BODY MASS INDEX: 19.3 KG/M2 | SYSTOLIC BLOOD PRESSURE: 118 MMHG | HEART RATE: 72 BPM | WEIGHT: 123 LBS | RESPIRATION RATE: 18 BRPM | TEMPERATURE: 97.9 F | DIASTOLIC BLOOD PRESSURE: 70 MMHG | HEIGHT: 67 IN | OXYGEN SATURATION: 96 %

## 2018-09-14 DIAGNOSIS — R05.9 COUGH: Primary | ICD-10-CM

## 2018-09-14 DIAGNOSIS — B96.89 ACUTE BACTERIAL SINUSITIS: ICD-10-CM

## 2018-09-14 DIAGNOSIS — J01.90 ACUTE BACTERIAL SINUSITIS: ICD-10-CM

## 2018-09-14 PROCEDURE — 4040F PNEUMOC VAC/ADMIN/RCVD: CPT | Performed by: FAMILY MEDICINE

## 2018-09-14 PROCEDURE — G8420 CALC BMI NORM PARAMETERS: HCPCS | Performed by: FAMILY MEDICINE

## 2018-09-14 PROCEDURE — 1101F PT FALLS ASSESS-DOCD LE1/YR: CPT | Performed by: FAMILY MEDICINE

## 2018-09-14 PROCEDURE — 1036F TOBACCO NON-USER: CPT | Performed by: FAMILY MEDICINE

## 2018-09-14 PROCEDURE — G8427 DOCREV CUR MEDS BY ELIG CLIN: HCPCS | Performed by: FAMILY MEDICINE

## 2018-09-14 PROCEDURE — 99213 OFFICE O/P EST LOW 20 MIN: CPT | Performed by: FAMILY MEDICINE

## 2018-09-14 PROCEDURE — 1123F ACP DISCUSS/DSCN MKR DOCD: CPT | Performed by: FAMILY MEDICINE

## 2018-09-14 PROCEDURE — 1090F PRES/ABSN URINE INCON ASSESS: CPT | Performed by: FAMILY MEDICINE

## 2018-09-14 RX ORDER — ALBUTEROL SULFATE 2.5 MG/3ML
2.5 SOLUTION RESPIRATORY (INHALATION) EVERY 6 HOURS PRN
Qty: 120 EACH | Refills: 3 | Status: SHIPPED | OUTPATIENT
Start: 2018-09-14 | End: 2018-10-05 | Stop reason: SDUPTHER

## 2018-09-14 RX ORDER — AMOXICILLIN AND CLAVULANATE POTASSIUM 875; 125 MG/1; MG/1
1 TABLET, FILM COATED ORAL 2 TIMES DAILY
Qty: 20 TABLET | Refills: 0 | Status: SHIPPED | OUTPATIENT
Start: 2018-09-14 | End: 2018-09-24

## 2018-09-17 ASSESSMENT — ENCOUNTER SYMPTOMS
WHEEZING: 0
NAUSEA: 0
ABDOMINAL PAIN: 0
SORE THROAT: 1
SHORTNESS OF BREATH: 1
COUGH: 1

## 2018-10-04 ENCOUNTER — OFFICE VISIT (OUTPATIENT)
Dept: FAMILY MEDICINE CLINIC | Age: 83
End: 2018-10-04
Payer: MEDICARE

## 2018-10-04 VITALS
RESPIRATION RATE: 18 BRPM | HEIGHT: 67 IN | HEART RATE: 61 BPM | OXYGEN SATURATION: 94 % | BODY MASS INDEX: 19.3 KG/M2 | SYSTOLIC BLOOD PRESSURE: 122 MMHG | DIASTOLIC BLOOD PRESSURE: 78 MMHG | WEIGHT: 123 LBS

## 2018-10-04 DIAGNOSIS — J41.0 SIMPLE CHRONIC BRONCHITIS (HCC): ICD-10-CM

## 2018-10-04 DIAGNOSIS — E28.39 ESTROGEN DEFICIENCY: ICD-10-CM

## 2018-10-04 DIAGNOSIS — Z23 NEED FOR IMMUNIZATION AGAINST INFLUENZA: Primary | ICD-10-CM

## 2018-10-04 DIAGNOSIS — F32.1 MODERATE SINGLE CURRENT EPISODE OF MAJOR DEPRESSIVE DISORDER (HCC): ICD-10-CM

## 2018-10-04 DIAGNOSIS — G47.62 NOCTURNAL LEG CRAMPS: ICD-10-CM

## 2018-10-04 PROBLEM — R63.4 ABNORMAL WEIGHT LOSS: Status: RESOLVED | Noted: 2017-01-19 | Resolved: 2018-10-04

## 2018-10-04 PROCEDURE — 1090F PRES/ABSN URINE INCON ASSESS: CPT | Performed by: FAMILY MEDICINE

## 2018-10-04 PROCEDURE — 4040F PNEUMOC VAC/ADMIN/RCVD: CPT | Performed by: FAMILY MEDICINE

## 2018-10-04 PROCEDURE — G8420 CALC BMI NORM PARAMETERS: HCPCS | Performed by: FAMILY MEDICINE

## 2018-10-04 PROCEDURE — 1101F PT FALLS ASSESS-DOCD LE1/YR: CPT | Performed by: FAMILY MEDICINE

## 2018-10-04 PROCEDURE — 90662 IIV NO PRSV INCREASED AG IM: CPT | Performed by: FAMILY MEDICINE

## 2018-10-04 PROCEDURE — G8926 SPIRO NO PERF OR DOC: HCPCS | Performed by: FAMILY MEDICINE

## 2018-10-04 PROCEDURE — G8482 FLU IMMUNIZE ORDER/ADMIN: HCPCS | Performed by: FAMILY MEDICINE

## 2018-10-04 PROCEDURE — G0008 ADMIN INFLUENZA VIRUS VAC: HCPCS | Performed by: FAMILY MEDICINE

## 2018-10-04 PROCEDURE — 3023F SPIROM DOC REV: CPT | Performed by: FAMILY MEDICINE

## 2018-10-04 PROCEDURE — 1123F ACP DISCUSS/DSCN MKR DOCD: CPT | Performed by: FAMILY MEDICINE

## 2018-10-04 PROCEDURE — 1036F TOBACCO NON-USER: CPT | Performed by: FAMILY MEDICINE

## 2018-10-04 PROCEDURE — 99213 OFFICE O/P EST LOW 20 MIN: CPT | Performed by: FAMILY MEDICINE

## 2018-10-04 PROCEDURE — G8427 DOCREV CUR MEDS BY ELIG CLIN: HCPCS | Performed by: FAMILY MEDICINE

## 2018-10-04 RX ORDER — MONTELUKAST SODIUM 10 MG/1
10 TABLET ORAL NIGHTLY
Qty: 30 TABLET | Refills: 3 | Status: SHIPPED | OUTPATIENT
Start: 2018-10-04 | End: 2018-11-29 | Stop reason: SDUPTHER

## 2018-10-04 RX ORDER — OMEPRAZOLE 20 MG/1
20 CAPSULE, DELAYED RELEASE ORAL DAILY
Qty: 30 CAPSULE | Refills: 3 | Status: SHIPPED | OUTPATIENT
Start: 2018-10-04 | End: 2018-11-29 | Stop reason: SDUPTHER

## 2018-10-04 RX ORDER — MIRTAZAPINE 30 MG/1
30 TABLET, FILM COATED ORAL NIGHTLY
Qty: 30 TABLET | Refills: 3 | Status: SHIPPED | OUTPATIENT
Start: 2018-10-04 | End: 2018-11-29 | Stop reason: SDUPTHER

## 2018-10-04 ASSESSMENT — PATIENT HEALTH QUESTIONNAIRE - PHQ9
2. FEELING DOWN, DEPRESSED OR HOPELESS: 0
SUM OF ALL RESPONSES TO PHQ9 QUESTIONS 1 & 2: 0
SUM OF ALL RESPONSES TO PHQ QUESTIONS 1-9: 0
1. LITTLE INTEREST OR PLEASURE IN DOING THINGS: 0
SUM OF ALL RESPONSES TO PHQ QUESTIONS 1-9: 0

## 2018-10-04 ASSESSMENT — ENCOUNTER SYMPTOMS
NAUSEA: 0
VOMITING: 0
DIARRHEA: 0
WHEEZING: 0
COUGH: 0
CONSTIPATION: 0
SHORTNESS OF BREATH: 0
ABDOMINAL PAIN: 0

## 2018-10-05 RX ORDER — ALBUTEROL SULFATE 2.5 MG/3ML
2.5 SOLUTION RESPIRATORY (INHALATION) EVERY 6 HOURS PRN
Qty: 120 EACH | Refills: 3 | Status: SHIPPED | OUTPATIENT
Start: 2018-10-05 | End: 2018-11-29 | Stop reason: SDUPTHER

## 2018-10-05 NOTE — TELEPHONE ENCOUNTER
Jb maxwell states they were trying to transfer medication from [facility] aid but  insurance needs hard copy  To fill. Pharmacy states that their fax is down but this should be able to be e-prescibed as long as a diagnosis is listed on script. Med pended.

## 2018-10-12 ENCOUNTER — TELEPHONE (OUTPATIENT)
Dept: FAMILY MEDICINE CLINIC | Age: 83
End: 2018-10-12

## 2018-10-29 DIAGNOSIS — R41.3 MEMORY LOSS: ICD-10-CM

## 2018-10-30 RX ORDER — MEMANTINE HYDROCHLORIDE 5 MG/1
5 TABLET ORAL 2 TIMES DAILY
Qty: 60 TABLET | Refills: 3 | Status: SHIPPED | OUTPATIENT
Start: 2018-10-30 | End: 2018-11-29 | Stop reason: SDUPTHER

## 2018-11-23 ENCOUNTER — HOSPITAL ENCOUNTER (OUTPATIENT)
Dept: MAMMOGRAPHY | Age: 83
Discharge: HOME | End: 2018-11-25
Payer: MEDICARE

## 2018-11-23 DIAGNOSIS — E28.39 ESTROGEN DEFICIENCY: ICD-10-CM

## 2018-11-23 PROCEDURE — 77080 DXA BONE DENSITY AXIAL: CPT

## 2018-11-27 RX ORDER — MELOXICAM 7.5 MG/1
7.5 TABLET ORAL DAILY
Qty: 30 TABLET | Refills: 1 | Status: SHIPPED | OUTPATIENT
Start: 2018-11-27 | End: 2018-11-29 | Stop reason: SDUPTHER

## 2018-11-29 ENCOUNTER — OFFICE VISIT (OUTPATIENT)
Dept: FAMILY MEDICINE CLINIC | Age: 83
End: 2018-11-29
Payer: MEDICARE

## 2018-11-29 ENCOUNTER — HOSPITAL ENCOUNTER (OUTPATIENT)
Age: 83
Discharge: HOME | End: 2018-12-01
Payer: MEDICARE

## 2018-11-29 VITALS
OXYGEN SATURATION: 96 % | SYSTOLIC BLOOD PRESSURE: 124 MMHG | RESPIRATION RATE: 18 BRPM | HEIGHT: 67 IN | BODY MASS INDEX: 19.78 KG/M2 | DIASTOLIC BLOOD PRESSURE: 72 MMHG | TEMPERATURE: 97.6 F | WEIGHT: 126 LBS | HEART RATE: 83 BPM

## 2018-11-29 DIAGNOSIS — R41.3 MEMORY LOSS: ICD-10-CM

## 2018-11-29 DIAGNOSIS — F32.1 MODERATE SINGLE CURRENT EPISODE OF MAJOR DEPRESSIVE DISORDER (HCC): ICD-10-CM

## 2018-11-29 DIAGNOSIS — B96.89 ACUTE BACTERIAL SINUSITIS: ICD-10-CM

## 2018-11-29 DIAGNOSIS — M81.0 AGE-RELATED OSTEOPOROSIS WITHOUT CURRENT PATHOLOGICAL FRACTURE: Primary | ICD-10-CM

## 2018-11-29 DIAGNOSIS — J41.0 SIMPLE CHRONIC BRONCHITIS (HCC): ICD-10-CM

## 2018-11-29 DIAGNOSIS — J01.90 ACUTE BACTERIAL SINUSITIS: ICD-10-CM

## 2018-11-29 LAB
ALBUMIN SERPL-MCNC: 4.6 G/DL (ref 3.5–5.2)
ALP BLD-CCNC: 73 U/L (ref 35–104)
ALT SERPL-CCNC: 11 U/L (ref 0–32)
ANION GAP SERPL CALCULATED.3IONS-SCNC: 15 MMOL/L (ref 7–16)
AST SERPL-CCNC: 15 U/L (ref 0–31)
BASOPHILS ABSOLUTE: 0.02 E9/L (ref 0–0.2)
BASOPHILS RELATIVE PERCENT: 0.2 % (ref 0–2)
BILIRUB SERPL-MCNC: 0.5 MG/DL (ref 0–1.2)
BUN BLDV-MCNC: 13 MG/DL (ref 8–23)
CALCIUM SERPL-MCNC: 9.8 MG/DL (ref 8.6–10.2)
CHLORIDE BLD-SCNC: 99 MMOL/L (ref 98–107)
CO2: 25 MMOL/L (ref 22–29)
CREAT SERPL-MCNC: 0.6 MG/DL (ref 0.5–1)
EOSINOPHILS ABSOLUTE: 0.11 E9/L (ref 0.05–0.5)
EOSINOPHILS RELATIVE PERCENT: 0.9 % (ref 0–6)
GFR AFRICAN AMERICAN: >60
GFR NON-AFRICAN AMERICAN: >60 ML/MIN/1.73
GLUCOSE BLD-MCNC: 105 MG/DL (ref 74–99)
HCT VFR BLD CALC: 45.7 % (ref 34–48)
HEMOGLOBIN: 14.1 G/DL (ref 11.5–15.5)
IMMATURE GRANULOCYTES #: 0.05 E9/L
IMMATURE GRANULOCYTES %: 0.4 % (ref 0–5)
LYMPHOCYTES ABSOLUTE: 1.12 E9/L (ref 1.5–4)
LYMPHOCYTES RELATIVE PERCENT: 9.7 % (ref 20–42)
MCH RBC QN AUTO: 28.4 PG (ref 26–35)
MCHC RBC AUTO-ENTMCNC: 30.9 % (ref 32–34.5)
MCV RBC AUTO: 92.1 FL (ref 80–99.9)
MONOCYTES ABSOLUTE: 0.7 E9/L (ref 0.1–0.95)
MONOCYTES RELATIVE PERCENT: 6 % (ref 2–12)
NEUTROPHILS ABSOLUTE: 9.6 E9/L (ref 1.8–7.3)
NEUTROPHILS RELATIVE PERCENT: 82.8 % (ref 43–80)
PDW BLD-RTO: 14.5 FL (ref 11.5–15)
PLATELET # BLD: 175 E9/L (ref 130–450)
PMV BLD AUTO: 12.1 FL (ref 7–12)
POTASSIUM SERPL-SCNC: 4.3 MMOL/L (ref 3.5–5)
RBC # BLD: 4.96 E12/L (ref 3.5–5.5)
SODIUM BLD-SCNC: 139 MMOL/L (ref 132–146)
TOTAL PROTEIN: 7.2 G/DL (ref 6.4–8.3)
WBC # BLD: 11.6 E9/L (ref 4.5–11.5)

## 2018-11-29 PROCEDURE — G8926 SPIRO NO PERF OR DOC: HCPCS | Performed by: FAMILY MEDICINE

## 2018-11-29 PROCEDURE — G8482 FLU IMMUNIZE ORDER/ADMIN: HCPCS | Performed by: FAMILY MEDICINE

## 2018-11-29 PROCEDURE — G8420 CALC BMI NORM PARAMETERS: HCPCS | Performed by: FAMILY MEDICINE

## 2018-11-29 PROCEDURE — 1101F PT FALLS ASSESS-DOCD LE1/YR: CPT | Performed by: FAMILY MEDICINE

## 2018-11-29 PROCEDURE — 99213 OFFICE O/P EST LOW 20 MIN: CPT | Performed by: FAMILY MEDICINE

## 2018-11-29 PROCEDURE — 80053 COMPREHEN METABOLIC PANEL: CPT

## 2018-11-29 PROCEDURE — 3023F SPIROM DOC REV: CPT | Performed by: FAMILY MEDICINE

## 2018-11-29 PROCEDURE — 1036F TOBACCO NON-USER: CPT | Performed by: FAMILY MEDICINE

## 2018-11-29 PROCEDURE — 1123F ACP DISCUSS/DSCN MKR DOCD: CPT | Performed by: FAMILY MEDICINE

## 2018-11-29 PROCEDURE — 1090F PRES/ABSN URINE INCON ASSESS: CPT | Performed by: FAMILY MEDICINE

## 2018-11-29 PROCEDURE — G8427 DOCREV CUR MEDS BY ELIG CLIN: HCPCS | Performed by: FAMILY MEDICINE

## 2018-11-29 PROCEDURE — 85025 COMPLETE CBC W/AUTO DIFF WBC: CPT

## 2018-11-29 PROCEDURE — 4040F PNEUMOC VAC/ADMIN/RCVD: CPT | Performed by: FAMILY MEDICINE

## 2018-11-29 PROCEDURE — 36415 COLL VENOUS BLD VENIPUNCTURE: CPT

## 2018-11-29 RX ORDER — MIRTAZAPINE 30 MG/1
30 TABLET, FILM COATED ORAL NIGHTLY
Qty: 90 TABLET | Refills: 3 | Status: SHIPPED | OUTPATIENT
Start: 2018-11-29 | End: 2019-06-12 | Stop reason: SDUPTHER

## 2018-11-29 RX ORDER — MONTELUKAST SODIUM 10 MG/1
10 TABLET ORAL NIGHTLY
Qty: 90 TABLET | Refills: 3 | Status: SHIPPED | OUTPATIENT
Start: 2018-11-29 | End: 2020-02-03

## 2018-11-29 RX ORDER — MEMANTINE HYDROCHLORIDE 5 MG/1
5 TABLET ORAL 2 TIMES DAILY
Qty: 60 TABLET | Refills: 3 | Status: SHIPPED | OUTPATIENT
Start: 2018-11-29 | End: 2019-06-16 | Stop reason: SDUPTHER

## 2018-11-29 RX ORDER — AZELASTINE 1 MG/ML
2 SPRAY, METERED NASAL DAILY
Qty: 3 BOTTLE | Refills: 3 | Status: SHIPPED | OUTPATIENT
Start: 2018-11-29 | End: 2019-06-03 | Stop reason: CLARIF

## 2018-11-29 RX ORDER — DOXYCYCLINE HYCLATE 100 MG
100 TABLET ORAL 2 TIMES DAILY
Qty: 20 TABLET | Refills: 0 | Status: SHIPPED | OUTPATIENT
Start: 2018-11-29 | End: 2018-12-09

## 2018-11-29 RX ORDER — OMEPRAZOLE 20 MG/1
20 CAPSULE, DELAYED RELEASE ORAL DAILY
Qty: 90 CAPSULE | Refills: 3 | Status: SHIPPED | OUTPATIENT
Start: 2018-11-29 | End: 2020-02-03

## 2018-11-29 RX ORDER — MELOXICAM 7.5 MG/1
7.5 TABLET ORAL DAILY
Qty: 90 TABLET | Refills: 1 | Status: SHIPPED | OUTPATIENT
Start: 2018-11-29 | End: 2019-02-19 | Stop reason: SDUPTHER

## 2018-11-29 RX ORDER — ALENDRONATE SODIUM 70 MG/1
70 TABLET ORAL
Qty: 12 TABLET | Refills: 3 | Status: SHIPPED | OUTPATIENT
Start: 2018-11-29 | End: 2019-10-30 | Stop reason: SDUPTHER

## 2018-11-29 RX ORDER — ALBUTEROL SULFATE 2.5 MG/3ML
2.5 SOLUTION RESPIRATORY (INHALATION) EVERY 6 HOURS PRN
Qty: 120 EACH | Refills: 3 | Status: SHIPPED | OUTPATIENT
Start: 2018-11-29 | End: 2019-09-16 | Stop reason: CLARIF

## 2018-11-29 ASSESSMENT — PATIENT HEALTH QUESTIONNAIRE - PHQ9
SUM OF ALL RESPONSES TO PHQ9 QUESTIONS 1 & 2: 0
1. LITTLE INTEREST OR PLEASURE IN DOING THINGS: 0
SUM OF ALL RESPONSES TO PHQ QUESTIONS 1-9: 0
SUM OF ALL RESPONSES TO PHQ QUESTIONS 1-9: 0
2. FEELING DOWN, DEPRESSED OR HOPELESS: 0

## 2018-11-29 ASSESSMENT — ENCOUNTER SYMPTOMS
RHINORRHEA: 1
ABDOMINAL PAIN: 0
SINUS PAIN: 1
SORE THROAT: 1
SINUS PRESSURE: 1
SHORTNESS OF BREATH: 0
NAUSEA: 0
COUGH: 1
VOMITING: 0

## 2018-12-21 ENCOUNTER — TELEPHONE (OUTPATIENT)
Dept: FAMILY MEDICINE CLINIC | Age: 83
End: 2018-12-21

## 2018-12-21 DIAGNOSIS — J32.9 CHRONIC SINUSITIS, UNSPECIFIED LOCATION: Primary | ICD-10-CM

## 2018-12-21 NOTE — TELEPHONE ENCOUNTER
Patients daughter called and states patients sinus infection and sinus pain is back. She is requesting a refill of antibiotics to be called in to her pharmacy.

## 2019-01-02 ENCOUNTER — OFFICE VISIT (OUTPATIENT)
Dept: ENT CLINIC | Age: 84
End: 2019-01-02
Payer: MEDICARE

## 2019-01-02 VITALS
BODY MASS INDEX: 19.78 KG/M2 | HEIGHT: 67 IN | DIASTOLIC BLOOD PRESSURE: 71 MMHG | HEART RATE: 65 BPM | WEIGHT: 126 LBS | SYSTOLIC BLOOD PRESSURE: 118 MMHG

## 2019-01-02 DIAGNOSIS — J30.0 CHRONIC VASOMOTOR RHINITIS: Primary | ICD-10-CM

## 2019-01-02 PROCEDURE — 4040F PNEUMOC VAC/ADMIN/RCVD: CPT | Performed by: OTOLARYNGOLOGY

## 2019-01-02 PROCEDURE — 1090F PRES/ABSN URINE INCON ASSESS: CPT | Performed by: OTOLARYNGOLOGY

## 2019-01-02 PROCEDURE — 99213 OFFICE O/P EST LOW 20 MIN: CPT | Performed by: OTOLARYNGOLOGY

## 2019-01-02 PROCEDURE — 1123F ACP DISCUSS/DSCN MKR DOCD: CPT | Performed by: OTOLARYNGOLOGY

## 2019-01-02 PROCEDURE — G8428 CUR MEDS NOT DOCUMENT: HCPCS | Performed by: OTOLARYNGOLOGY

## 2019-01-02 PROCEDURE — 1036F TOBACCO NON-USER: CPT | Performed by: OTOLARYNGOLOGY

## 2019-01-02 PROCEDURE — G8420 CALC BMI NORM PARAMETERS: HCPCS | Performed by: OTOLARYNGOLOGY

## 2019-01-02 PROCEDURE — 1101F PT FALLS ASSESS-DOCD LE1/YR: CPT | Performed by: OTOLARYNGOLOGY

## 2019-01-02 PROCEDURE — G8482 FLU IMMUNIZE ORDER/ADMIN: HCPCS | Performed by: OTOLARYNGOLOGY

## 2019-01-02 ASSESSMENT — ENCOUNTER SYMPTOMS
EYE ITCHING: 0
TROUBLE SWALLOWING: 0
RECTAL PAIN: 0
EYE DISCHARGE: 0
SHORTNESS OF BREATH: 0
SINUS PRESSURE: 1
EYE PAIN: 0
COUGH: 0
WHEEZING: 0
VOICE CHANGE: 0
DIARRHEA: 0
VOMITING: 0
SORE THROAT: 0

## 2019-02-18 ENCOUNTER — TELEPHONE (OUTPATIENT)
Dept: FAMILY MEDICINE CLINIC | Age: 84
End: 2019-02-18

## 2019-02-19 RX ORDER — MELOXICAM 7.5 MG/1
7.5 TABLET ORAL DAILY
Qty: 90 TABLET | Refills: 1 | Status: SHIPPED | OUTPATIENT
Start: 2019-02-19 | End: 2019-08-15 | Stop reason: SDUPTHER

## 2019-05-20 NOTE — PROGRESS NOTES
[address]   [phone]   Jennie Delgado MD     Patient: Jia Estes  YOB: 1929  Visit Date: 9/14/18    Tata Sharma is a 80y.o. year old female here today for   Chief Complaint   Patient presents with    Cough     x 3 days     Congestion       HPI   was sick and passed away 8/11. Daughter got sick . Has been very stressed. By Wednesday was not feeling well. Hospice nurse came today and said she was having diminished right upper lobe sounds and has been struggling to breath. Has been having cough since Wednesday and gotten increasingly worse. Is feeling short of breath and breathing heavy. Has not been doing much of anything. Gets tired very easily. Has been sleeping ok. Still living at home alone. Has chest pain only when coughing. Denies fevers or chills, nausea or vomiting. Is eating ok. [place] like things were getting stuck in her throat yesterday. Has also been having pain in her chest. Is having significant post nasal drip. Review of Systems   Constitutional: Positive for fatigue. Negative for chills, fever and unexpected weight change. HENT: Positive for postnasal drip and sore throat. Respiratory: Positive for cough and shortness of breath. Negative for wheezing. Cardiovascular: Positive for chest pain (when coughing). Negative for palpitations and leg swelling. Gastrointestinal: Negative for abdominal pain and nausea. Psychiatric/Behavioral: Negative for sleep disturbance. The patient is nervous/anxious.         Current Outpatient Prescriptions on File Prior to Visit   Medication Sig Dispense Refill    mirtazapine (REMERON) 30 MG tablet Take 1 tablet by mouth nightly 30 tablet 3    meloxicam (MOBIC) 7.5 MG tablet Take 7.5 mg by mouth daily      Elastic Bandages & Supports (LUMBAR BACK BRACE/SUPPORT PAD) MISC SI support 1 each 0    montelukast (SINGULAIR) 10 MG tablet Take 1 tablet by mouth nightly 30
3

## 2019-06-03 ENCOUNTER — OFFICE VISIT (OUTPATIENT)
Dept: FAMILY MEDICINE CLINIC | Age: 84
End: 2019-06-03
Payer: MEDICARE

## 2019-06-03 VITALS
RESPIRATION RATE: 16 BRPM | OXYGEN SATURATION: 93 % | DIASTOLIC BLOOD PRESSURE: 72 MMHG | HEART RATE: 63 BPM | SYSTOLIC BLOOD PRESSURE: 124 MMHG | WEIGHT: 130 LBS | BODY MASS INDEX: 20.67 KG/M2

## 2019-06-03 DIAGNOSIS — R60.0 LOWER EXTREMITY EDEMA: Primary | ICD-10-CM

## 2019-06-03 PROCEDURE — G8427 DOCREV CUR MEDS BY ELIG CLIN: HCPCS | Performed by: FAMILY MEDICINE

## 2019-06-03 PROCEDURE — 4040F PNEUMOC VAC/ADMIN/RCVD: CPT | Performed by: FAMILY MEDICINE

## 2019-06-03 PROCEDURE — 1090F PRES/ABSN URINE INCON ASSESS: CPT | Performed by: FAMILY MEDICINE

## 2019-06-03 PROCEDURE — 99213 OFFICE O/P EST LOW 20 MIN: CPT | Performed by: FAMILY MEDICINE

## 2019-06-03 PROCEDURE — 1036F TOBACCO NON-USER: CPT | Performed by: FAMILY MEDICINE

## 2019-06-03 PROCEDURE — 1123F ACP DISCUSS/DSCN MKR DOCD: CPT | Performed by: FAMILY MEDICINE

## 2019-06-03 PROCEDURE — G8420 CALC BMI NORM PARAMETERS: HCPCS | Performed by: FAMILY MEDICINE

## 2019-06-03 ASSESSMENT — ENCOUNTER SYMPTOMS
WHEEZING: 0
COUGH: 0
SHORTNESS OF BREATH: 0

## 2019-06-03 NOTE — PATIENT INSTRUCTIONS
Get your US done   Get your blood work done   Use your compression stockings   Let me know if it gets worse. Follow up in 1 week.

## 2019-06-03 NOTE — PROGRESS NOTES
[facility]  [phone]   Vera Bhatia MD     Patient: Terrell Charles  YOB: 1929  Visit Date: 6/3/19    Jackie Alonzo is a 80y.o. year old female here today for   Chief Complaint   Patient presents with    Leg Swelling     left leg/ankle       HPI  Has swellin in left leg. Usually swells more than the right. In the past week has been swelling more and more frequently. Has been more painful. Has been elevating . dirnking fluids and wearing compressio nstockings. Sometimes goes up your leg. Review of Systems   Constitutional: Negative for chills and fever. Respiratory: Negative for cough, shortness of breath and wheezing. Cardiovascular: Positive for leg swelling. Negative for chest pain and palpitations.        Current Outpatient Medications on File Prior to Visit   Medication Sig Dispense Refill    meloxicam (MOBIC) 7.5 MG tablet Take 1 tablet by mouth daily 90 tablet 1    alendronate (FOSAMAX) 70 MG tablet Take 1 tablet by mouth every 7 days 12 tablet 3    memantine (NAMENDA) 5 MG tablet Take 1 tablet by mouth 2 times daily 60 tablet 3    mirtazapine (REMERON) 30 MG tablet Take 1 tablet by mouth nightly 90 tablet 3    montelukast (SINGULAIR) 10 MG tablet Take 1 tablet by mouth nightly 90 tablet 3    omeprazole (PRILOSEC) 20 MG delayed release capsule Take 1 capsule by mouth daily 90 capsule 3    Umeclidinium Bromide (INCRUSE ELLIPTA) 62.5 MCG/INH AEPB Inhale 1 puff into the lungs daily 3 each 2    albuterol (PROVENTIL) (2.5 MG/3ML) 0.083% nebulizer solution Take 3 mLs by nebulization every 6 hours as needed for Wheezing 120 each 3    magnesium oxide (MAG-OX) 400 MG tablet Take 400 mg by mouth daily      Cyanocobalamin (VITAMIN B 12 PO) Take by mouth      vitamin D (CHOLECALCIFEROL) 1000 UNIT TABS tablet Take 1,000 Units by mouth 2 times daily       Biotin w/ Vitamins C & E (HAIR/SKIN/NAILS PO) Take by mouth      guaiFENesin (MUCINEX) 600 MG extended release tablet Take 600 mg by mouth daily as needed       Multiple Vitamins-Minerals (MULTIVITAMIN ADULT PO) Take by mouth      aspirin 81 MG chewable tablet Take 81 mg by mouth daily      Elastic Bandages & Supports (LUMBAR BACK BRACE/SUPPORT PAD) MISC SI support 1 each 0    diclofenac (FLECTOR) 1.3 % patch Place 1 patch onto the skin 2 times daily as needed (pain) 60 patch 2     No current facility-administered medications on file prior to visit. Allergies   Allergen Reactions    Demerol Hcl [Meperidine]      Other reaction(s): Intolerance  \"went into shok\"    Statins Other (See Comments)       Past medical, surgical, socialand/or family history reviewed, updated as needed, and are non-contributory (unless otherwise stated). Medications, allergies, and problem list also reviewed and updated as needed in patient's record. Wt Readings from Last 3 Encounters:   06/03/19 130 lb (59 kg)   01/02/19 126 lb (57.2 kg)   11/29/18 126 lb (57.2 kg)                   /72 (Site: Right Upper Arm, Position: Sitting, Cuff Size: Medium Adult)   Pulse 63   Resp 16   Wt 130 lb (59 kg)   SpO2 93%   BMI 20.67 kg/m²        Physical Exam   Constitutional: She appears well-developed and well-nourished. Cardiovascular: Normal rate, regular rhythm, normal heart sounds and intact distal pulses. Exam reveals no friction rub. No murmur heard. Pulmonary/Chest: Effort normal and breath sounds normal. No stridor. No respiratory distress. She has no wheezes. She has no rales. Abdominal: Soft. Bowel sounds are normal. She exhibits no distension and no mass. There is no tenderness. There is no rebound and no guarding. Musculoskeletal: Normal range of motion. She exhibits edema (1+ pitting edema to mid calf on left. trace edema on right ). Skin:   No erythema    Nursing note and vitals reviewed.     Results for orders placed or performed during the hospital encounter of 11/29/18   CBC Auto Differential   Result Value Ref Range    WBC 11.6 (H) 4.5 - 11.5 E9/L    RBC 4.96 3.50 - 5.50 E12/L    Hemoglobin 14.1 11.5 - 15.5 g/dL    Hematocrit 45.7 34.0 - 48.0 %    MCV 92.1 80.0 - 99.9 fL    MCH 28.4 26.0 - 35.0 pg    MCHC 30.9 (L) 32.0 - 34.5 %    RDW 14.5 11.5 - 15.0 fL    Platelets 742 709 - 078 E9/L    MPV 12.1 (H) 7.0 - 12.0 fL    Neutrophils % 82.8 (H) 43.0 - 80.0 %    Immature Granulocytes % 0.4 0.0 - 5.0 %    Lymphocytes % 9.7 (L) 20.0 - 42.0 %    Monocytes % 6.0 2.0 - 12.0 %    Eosinophils % 0.9 0.0 - 6.0 %    Basophils % 0.2 0.0 - 2.0 %    Neutrophils # 9.60 (H) 1.80 - 7.30 E9/L    Immature Granulocytes # 0.05 E9/L    Lymphocytes # 1.12 (L) 1.50 - 4.00 E9/L    Monocytes # 0.70 0.10 - 0.95 E9/L    Eosinophils # 0.11 0.05 - 0.50 E9/L    Basophils # 0.02 0.00 - 0.20 E9/L   Comprehensive Metabolic Panel   Result Value Ref Range    Sodium 139 132 - 146 mmol/L    Potassium 4.3 3.5 - 5.0 mmol/L    Chloride 99 98 - 107 mmol/L    CO2 25 22 - 29 mmol/L    Anion Gap 15 7 - 16 mmol/L    Glucose 105 (H) 74 - 99 mg/dL    BUN 13 8 - 23 mg/dL    CREATININE 0.6 0.5 - 1.0 mg/dL    GFR Non-African American >60 >=60 mL/min/1.73    GFR African American >60     Calcium 9.8 8.6 - 10.2 mg/dL    Total Protein 7.2 6.4 - 8.3 g/dL    Alb 4.6 3.5 - 5.2 g/dL    Total Bilirubin 0.5 0.0 - 1.2 mg/dL    Alkaline Phosphatase 73 35 - 104 U/L    ALT 11 0 - 32 U/L    AST 15 0 - 31 U/L       ASSESSMENT/PLAN  [place] was seen today for leg swelling. Diagnoses and all orders for this visit:    Lower extremity edema  -     Compression Stocking  -     US DUP UPPER EXTREMITY LEFT VENOUS; Future  -     Basic Metabolic Panel; Future  - Will check US to rule out DVT , this is unlikely. Most likely venous insufficiency - recommended compression stockings. Phone/MyChart follow up if tests abnormal.    Return in about 9 days (around 6/12/2019). or sooner if necessary.     I have reviewed myfindings and recommendations with [place]. Krista Vinson.  Janice Terrazas M.D

## 2019-06-04 ENCOUNTER — HOSPITAL ENCOUNTER (OUTPATIENT)
Age: 84
Discharge: HOME | End: 2019-06-06
Payer: MEDICARE

## 2019-06-04 DIAGNOSIS — R60.0 LOWER EXTREMITY EDEMA: ICD-10-CM

## 2019-06-04 LAB
ANION GAP SERPL CALCULATED.3IONS-SCNC: 8 MMOL/L (ref 7–16)
BUN BLDV-MCNC: 14 MG/DL (ref 8–23)
CALCIUM SERPL-MCNC: 9.5 MG/DL (ref 8.6–10.2)
CHLORIDE BLD-SCNC: 104 MMOL/L (ref 98–107)
CO2: 29 MMOL/L (ref 22–29)
CREAT SERPL-MCNC: 0.7 MG/DL (ref 0.5–1)
GFR AFRICAN AMERICAN: >60
GFR NON-AFRICAN AMERICAN: >60 ML/MIN/1.73
GLUCOSE BLD-MCNC: 98 MG/DL (ref 74–99)
POTASSIUM SERPL-SCNC: 4.6 MMOL/L (ref 3.5–5)
SODIUM BLD-SCNC: 141 MMOL/L (ref 132–146)

## 2019-06-04 PROCEDURE — 36415 COLL VENOUS BLD VENIPUNCTURE: CPT

## 2019-06-04 PROCEDURE — 80048 BASIC METABOLIC PNL TOTAL CA: CPT

## 2019-06-12 ENCOUNTER — OFFICE VISIT (OUTPATIENT)
Dept: FAMILY MEDICINE CLINIC | Age: 84
End: 2019-06-12
Payer: MEDICARE

## 2019-06-12 VITALS
DIASTOLIC BLOOD PRESSURE: 64 MMHG | WEIGHT: 130 LBS | OXYGEN SATURATION: 98 % | SYSTOLIC BLOOD PRESSURE: 128 MMHG | RESPIRATION RATE: 16 BRPM | HEART RATE: 64 BPM | BODY MASS INDEX: 20.67 KG/M2

## 2019-06-12 DIAGNOSIS — J41.0 SIMPLE CHRONIC BRONCHITIS (HCC): ICD-10-CM

## 2019-06-12 DIAGNOSIS — R60.0 LOWER EXTREMITY EDEMA: Primary | ICD-10-CM

## 2019-06-12 DIAGNOSIS — F32.1 MODERATE SINGLE CURRENT EPISODE OF MAJOR DEPRESSIVE DISORDER (HCC): ICD-10-CM

## 2019-06-12 PROCEDURE — 4040F PNEUMOC VAC/ADMIN/RCVD: CPT | Performed by: FAMILY MEDICINE

## 2019-06-12 PROCEDURE — 1036F TOBACCO NON-USER: CPT | Performed by: FAMILY MEDICINE

## 2019-06-12 PROCEDURE — 99213 OFFICE O/P EST LOW 20 MIN: CPT | Performed by: FAMILY MEDICINE

## 2019-06-12 PROCEDURE — 3288F FALL RISK ASSESSMENT DOCD: CPT | Performed by: FAMILY MEDICINE

## 2019-06-12 PROCEDURE — G8420 CALC BMI NORM PARAMETERS: HCPCS | Performed by: FAMILY MEDICINE

## 2019-06-12 PROCEDURE — G8926 SPIRO NO PERF OR DOC: HCPCS | Performed by: FAMILY MEDICINE

## 2019-06-12 PROCEDURE — 1090F PRES/ABSN URINE INCON ASSESS: CPT | Performed by: FAMILY MEDICINE

## 2019-06-12 PROCEDURE — G8427 DOCREV CUR MEDS BY ELIG CLIN: HCPCS | Performed by: FAMILY MEDICINE

## 2019-06-12 PROCEDURE — 3023F SPIROM DOC REV: CPT | Performed by: FAMILY MEDICINE

## 2019-06-12 PROCEDURE — 1123F ACP DISCUSS/DSCN MKR DOCD: CPT | Performed by: FAMILY MEDICINE

## 2019-06-12 RX ORDER — MIRTAZAPINE 45 MG/1
45 TABLET, FILM COATED ORAL NIGHTLY
Qty: 90 TABLET | Refills: 3 | Status: SHIPPED | OUTPATIENT
Start: 2019-06-12 | End: 2019-09-16

## 2019-06-12 RX ORDER — FLUTICASONE PROPIONATE 50 MCG
SPRAY, SUSPENSION (ML) NASAL
Refills: 3 | COMMUNITY
Start: 2019-04-06 | End: 2019-07-18 | Stop reason: SDUPTHER

## 2019-06-12 ASSESSMENT — ENCOUNTER SYMPTOMS
COUGH: 0
WHEEZING: 0
SHORTNESS OF BREATH: 0

## 2019-06-12 NOTE — PROGRESS NOTES
[facility] [place]  [phone]   Desiree Briggs MD     Patient: Javad Ponce  YOB: 1929  Visit Date: 6/12/19    Asher Black is a 80y.o. year old female here today for   Chief Complaint   Patient presents with    Edema     a little better but still some swelling        HPI  Patient is an 80year old female here for follow up of lower extremity edema. Left leg worse than the right. Had US that was negative for blood clot. Has been cutting out salt   Has been elevating leg and using compression stockings. Sleep has been so so . Sometimes wakes up at 4 am.   Mood has been good. COPD has been under control. Likes the incrus ellipta. Review of Systems   Constitutional: Negative for chills and fever. Respiratory: Negative for cough, shortness of breath and wheezing. Cardiovascular: Positive for leg swelling (improvement). Negative for chest pain and palpitations.        Current Outpatient Medications on File Prior to Visit   Medication Sig Dispense Refill    meloxicam (MOBIC) 7.5 MG tablet Take 1 tablet by mouth daily 90 tablet 1    alendronate (FOSAMAX) 70 MG tablet Take 1 tablet by mouth every 7 days 12 tablet 3    memantine (NAMENDA) 5 MG tablet Take 1 tablet by mouth 2 times daily 60 tablet 3    montelukast (SINGULAIR) 10 MG tablet Take 1 tablet by mouth nightly 90 tablet 3    omeprazole (PRILOSEC) 20 MG delayed release capsule Take 1 capsule by mouth daily 90 capsule 3    Umeclidinium Bromide (INCRUSE ELLIPTA) 62.5 MCG/INH AEPB Inhale 1 puff into the lungs daily 3 each 2    albuterol (PROVENTIL) (2.5 MG/3ML) 0.083% nebulizer solution Take 3 mLs by nebulization every 6 hours as needed for Wheezing 120 each 3    Elastic Bandages & Supports (LUMBAR BACK BRACE/SUPPORT PAD) MISC SI support 1 each 0    magnesium oxide (MAG-OX) 400 MG tablet Take 400 mg by mouth daily      Cyanocobalamin (VITAMIN B 12 PO) Take by mouth      vitamin D (CHOLECALCIFEROL) 1000 UNIT TABS tablet Take 1,000 Units by mouth 2 times daily       Biotin w/ Vitamins C & E (HAIR/SKIN/NAILS PO) Take by mouth      guaiFENesin (MUCINEX) 600 MG extended release tablet Take 600 mg by mouth daily as needed       Multiple Vitamins-Minerals (MULTIVITAMIN ADULT PO) Take by mouth      aspirin 81 MG chewable tablet Take 81 mg by mouth daily      fluticasone (FLONASE) 50 MCG/ACT nasal spray INSTILL 2 SPRAYS INTO EACH NOSTRIL ONCE DAILY  3    diclofenac (FLECTOR) 1.3 % patch Place 1 patch onto the skin 2 times daily as needed (pain) 60 patch 2     No current facility-administered medications on file prior to visit. Allergies   Allergen Reactions    Demerol Hcl [Meperidine]      Other reaction(s): Intolerance  \"went into shok\"    Statins Other (See Comments)       Past medical, surgical, socialand/or family history reviewed, updated as needed, and are non-contributory (unless otherwise stated). Medications, allergies, and problem list also reviewed and updated as needed in patient's record. Wt Readings from Last 3 Encounters:   06/12/19 130 lb (59 kg)   06/03/19 130 lb (59 kg)   01/02/19 126 lb (57.2 kg)                   /64 (Site: Right Upper Arm, Position: Sitting, Cuff Size: Large Adult)   Pulse 64   Resp 16   Wt 130 lb (59 kg)   SpO2 98%   BMI 20.67 kg/m²        Physical Exam   Constitutional: She appears well-developed and well-nourished. Cardiovascular: Normal rate, regular rhythm, normal heart sounds and intact distal pulses. Exam reveals no friction rub. No murmur heard. Pulmonary/Chest: Effort normal and breath sounds normal. No stridor. No respiratory distress. She has no wheezes. She has no rales. Abdominal: Soft. Bowel sounds are normal. She exhibits no distension and no mass. There is no tenderness. There is no rebound and no guarding. Musculoskeletal: Normal range of motion. She exhibits edema (trace pitting edema bilaterally. compression stockings on left). Skin:   No erythema    Nursing note and vitals reviewed. Results for orders placed or performed during the hospital encounter of 90/95/26   Basic Metabolic Panel   Result Value Ref Range    Sodium 141 132 - 146 mmol/L    Potassium 4.6 3.5 - 5.0 mmol/L    Chloride 104 98 - 107 mmol/L    CO2 29 22 - 29 mmol/L    Anion Gap 8 7 - 16 mmol/L    Glucose 98 74 - 99 mg/dL    BUN 14 8 - 23 mg/dL    CREATININE 0.7 0.5 - 1.0 mg/dL    GFR Non-African American >60 >=60 mL/min/1.73    GFR African American >60     Calcium 9.5 8.6 - 10.2 mg/dL       ASSESSMENT/PLAN  [place] was seen today for edema. Diagnoses and all orders for this visit:    Lower extremity edema   - Continue supportive management. Moderate single current episode of major depressive disorder (HCC)  -     mirtazapine (REMERON) 45 MG tablet; Take 1 tablet by mouth nightly   - Trial increase remeron     Simple chronic bronchitis (HCC)   - Continue current meds. Phone/MyChart follow up if tests abnormal.    Return in about 3 months (around 9/12/2019) for swelling . or sooner if necessary. I have reviewed myfindings and recommendations with [place]. Dm Tim.  Gurpreet Patrick M.D

## 2019-06-16 DIAGNOSIS — R41.3 MEMORY LOSS: ICD-10-CM

## 2019-06-17 RX ORDER — MEMANTINE HYDROCHLORIDE 5 MG/1
TABLET ORAL
Qty: 60 TABLET | Refills: 3 | Status: SHIPPED | OUTPATIENT
Start: 2019-06-17 | End: 2019-09-16 | Stop reason: SDUPTHER

## 2019-07-18 RX ORDER — FLUTICASONE PROPIONATE 50 MCG
2 SPRAY, SUSPENSION (ML) NASAL DAILY
Qty: 1 BOTTLE | Refills: 3 | Status: SHIPPED | OUTPATIENT
Start: 2019-07-18 | End: 2019-10-30

## 2019-08-15 RX ORDER — MELOXICAM 7.5 MG/1
TABLET ORAL
Qty: 90 TABLET | Refills: 0 | Status: SHIPPED
Start: 2019-08-15 | End: 2020-05-12 | Stop reason: SDUPTHER

## 2019-08-30 ENCOUNTER — HOSPITAL ENCOUNTER (OUTPATIENT)
Age: 84
Discharge: HOME | End: 2019-09-01
Payer: MEDICARE

## 2019-08-30 ENCOUNTER — OFFICE VISIT (OUTPATIENT)
Dept: FAMILY MEDICINE CLINIC | Age: 84
End: 2019-08-30
Payer: MEDICARE

## 2019-08-30 VITALS
BODY MASS INDEX: 20.73 KG/M2 | OXYGEN SATURATION: 98 % | HEART RATE: 73 BPM | WEIGHT: 129 LBS | SYSTOLIC BLOOD PRESSURE: 132 MMHG | DIASTOLIC BLOOD PRESSURE: 76 MMHG | HEIGHT: 66 IN

## 2019-08-30 DIAGNOSIS — R35.0 URINARY FREQUENCY: ICD-10-CM

## 2019-08-30 DIAGNOSIS — N30.90 CYSTITIS: Primary | ICD-10-CM

## 2019-08-30 DIAGNOSIS — R42 VERTIGO: ICD-10-CM

## 2019-08-30 LAB
BILIRUBIN, POC: ABNORMAL
BLOOD URINE, POC: ABNORMAL
CLARITY, POC: CLEAR
COLOR, POC: YELLOW
GLUCOSE URINE, POC: ABNORMAL
KETONES, POC: ABNORMAL
LEUKOCYTE EST, POC: ABNORMAL
NITRITE, POC: ABNORMAL
PH, POC: 6.5
PROTEIN, POC: ABNORMAL
SPECIFIC GRAVITY, POC: 1.01
UROBILINOGEN, POC: 0.2

## 2019-08-30 PROCEDURE — 99213 OFFICE O/P EST LOW 20 MIN: CPT | Performed by: PHYSICIAN ASSISTANT

## 2019-08-30 PROCEDURE — 87186 SC STD MICRODIL/AGAR DIL: CPT

## 2019-08-30 PROCEDURE — 87077 CULTURE AEROBIC IDENTIFY: CPT

## 2019-08-30 PROCEDURE — 87088 URINE BACTERIA CULTURE: CPT

## 2019-08-30 PROCEDURE — 81002 URINALYSIS NONAUTO W/O SCOPE: CPT | Performed by: PHYSICIAN ASSISTANT

## 2019-08-30 RX ORDER — MECLIZINE HCL 12.5 MG/1
12.5 TABLET ORAL 2 TIMES DAILY PRN
Qty: 20 TABLET | Refills: 0 | Status: SHIPPED | OUTPATIENT
Start: 2019-08-30 | End: 2019-09-09

## 2019-08-30 RX ORDER — CEPHALEXIN 500 MG/1
500 CAPSULE ORAL 2 TIMES DAILY
Qty: 14 CAPSULE | Refills: 0 | Status: SHIPPED | OUTPATIENT
Start: 2019-08-30 | End: 2019-09-03 | Stop reason: ALTCHOICE

## 2019-08-30 ASSESSMENT — ENCOUNTER SYMPTOMS
CHEST TIGHTNESS: 0
VOMITING: 0
PHOTOPHOBIA: 0
COUGH: 0
BACK PAIN: 0
RHINORRHEA: 1
ABDOMINAL PAIN: 0
DIARRHEA: 0
EYE PAIN: 0
NAUSEA: 0
SHORTNESS OF BREATH: 0
SINUS PRESSURE: 1
TROUBLE SWALLOWING: 0
FACIAL SWELLING: 0
EYE REDNESS: 0
SORE THROAT: 0
WHEEZING: 0

## 2019-09-02 LAB
ORGANISM: ABNORMAL
URINE CULTURE, ROUTINE: ABNORMAL

## 2019-09-03 ENCOUNTER — TELEPHONE (OUTPATIENT)
Dept: INTERNAL MEDICINE CLINIC | Age: 84
End: 2019-09-03

## 2019-09-03 RX ORDER — LEVOFLOXACIN 250 MG/1
250 TABLET ORAL DAILY
Qty: 5 TABLET | Refills: 0 | Status: SHIPPED | OUTPATIENT
Start: 2019-09-03 | End: 2019-09-08

## 2019-09-11 ENCOUNTER — TELEPHONE (OUTPATIENT)
Dept: FAMILY MEDICINE CLINIC | Age: 84
End: 2019-09-11

## 2019-09-16 ENCOUNTER — HOSPITAL ENCOUNTER (OUTPATIENT)
Age: 84
Discharge: HOME | End: 2019-09-18
Payer: MEDICARE

## 2019-09-16 ENCOUNTER — OFFICE VISIT (OUTPATIENT)
Dept: FAMILY MEDICINE CLINIC | Age: 84
End: 2019-09-16
Payer: MEDICARE

## 2019-09-16 VITALS
OXYGEN SATURATION: 95 % | HEART RATE: 64 BPM | DIASTOLIC BLOOD PRESSURE: 88 MMHG | TEMPERATURE: 97.7 F | RESPIRATION RATE: 16 BRPM | HEIGHT: 66 IN | WEIGHT: 128 LBS | SYSTOLIC BLOOD PRESSURE: 128 MMHG | BODY MASS INDEX: 20.57 KG/M2

## 2019-09-16 DIAGNOSIS — M54.6 CHRONIC RIGHT-SIDED THORACIC BACK PAIN: ICD-10-CM

## 2019-09-16 DIAGNOSIS — R39.15 URINARY URGENCY: Primary | ICD-10-CM

## 2019-09-16 DIAGNOSIS — R41.3 MEMORY LOSS: ICD-10-CM

## 2019-09-16 DIAGNOSIS — H61.23 BILATERAL IMPACTED CERUMEN: ICD-10-CM

## 2019-09-16 DIAGNOSIS — R42 VERTIGO: ICD-10-CM

## 2019-09-16 DIAGNOSIS — R39.15 URINARY URGENCY: ICD-10-CM

## 2019-09-16 DIAGNOSIS — G89.29 CHRONIC RIGHT-SIDED THORACIC BACK PAIN: ICD-10-CM

## 2019-09-16 LAB
BACTERIA: ABNORMAL /HPF
BILIRUBIN URINE: NEGATIVE
BLOOD, URINE: NEGATIVE
CLARITY: CLEAR
COLOR: YELLOW
GLUCOSE URINE: NEGATIVE MG/DL
KETONES, URINE: NEGATIVE MG/DL
LEUKOCYTE ESTERASE, URINE: ABNORMAL
NITRITE, URINE: NEGATIVE
PH UA: 6.5 (ref 5–9)
PROTEIN UA: NEGATIVE MG/DL
RBC UA: ABNORMAL /HPF (ref 0–2)
RENAL EPITHELIAL, UA: ABNORMAL /HPF
SPECIFIC GRAVITY UA: 1.01 (ref 1–1.03)
UROBILINOGEN, URINE: 0.2 E.U./DL
WBC UA: ABNORMAL /HPF (ref 0–5)

## 2019-09-16 PROCEDURE — 99214 OFFICE O/P EST MOD 30 MIN: CPT | Performed by: FAMILY MEDICINE

## 2019-09-16 PROCEDURE — 87088 URINE BACTERIA CULTURE: CPT

## 2019-09-16 PROCEDURE — G8420 CALC BMI NORM PARAMETERS: HCPCS | Performed by: FAMILY MEDICINE

## 2019-09-16 PROCEDURE — 1123F ACP DISCUSS/DSCN MKR DOCD: CPT | Performed by: FAMILY MEDICINE

## 2019-09-16 PROCEDURE — 81001 URINALYSIS AUTO W/SCOPE: CPT

## 2019-09-16 PROCEDURE — G8427 DOCREV CUR MEDS BY ELIG CLIN: HCPCS | Performed by: FAMILY MEDICINE

## 2019-09-16 PROCEDURE — 81003 URINALYSIS AUTO W/O SCOPE: CPT | Performed by: FAMILY MEDICINE

## 2019-09-16 PROCEDURE — 1036F TOBACCO NON-USER: CPT | Performed by: FAMILY MEDICINE

## 2019-09-16 PROCEDURE — 4040F PNEUMOC VAC/ADMIN/RCVD: CPT | Performed by: FAMILY MEDICINE

## 2019-09-16 PROCEDURE — 1090F PRES/ABSN URINE INCON ASSESS: CPT | Performed by: FAMILY MEDICINE

## 2019-09-16 RX ORDER — MECLIZINE HCL 12.5 MG/1
TABLET ORAL
Qty: 30 TABLET | Refills: 3 | Status: SHIPPED
Start: 2019-09-16 | End: 2020-06-01 | Stop reason: ALTCHOICE

## 2019-09-16 RX ORDER — MECLIZINE HCL 12.5 MG/1
TABLET ORAL
COMMUNITY
Start: 2019-08-30 | End: 2019-09-16 | Stop reason: SDUPTHER

## 2019-09-16 RX ORDER — OXYCODONE HYDROCHLORIDE AND ACETAMINOPHEN 5; 325 MG/1; MG/1
1 TABLET ORAL EVERY 8 HOURS PRN
Qty: 30 TABLET | Refills: 0 | Status: SHIPPED | OUTPATIENT
Start: 2019-09-16 | End: 2019-09-30

## 2019-09-16 RX ORDER — MEMANTINE HYDROCHLORIDE 5 MG/1
TABLET ORAL
Qty: 180 TABLET | Refills: 3 | Status: SHIPPED
Start: 2019-09-16 | End: 2020-08-23 | Stop reason: SDUPTHER

## 2019-09-16 RX ORDER — MIRTAZAPINE 30 MG/1
30 TABLET, FILM COATED ORAL NIGHTLY
COMMUNITY
End: 2019-09-16 | Stop reason: SDUPTHER

## 2019-09-16 RX ORDER — MIRTAZAPINE 30 MG/1
30 TABLET, FILM COATED ORAL NIGHTLY
Qty: 90 TABLET | Refills: 3 | Status: SHIPPED
Start: 2019-09-16 | End: 2020-08-23 | Stop reason: SDUPTHER

## 2019-09-16 RX ORDER — AZELASTINE HYDROCHLORIDE, FLUTICASONE PROPIONATE 137; 50 UG/1; UG/1
1 SPRAY, METERED NASAL 2 TIMES DAILY
Qty: 1 BOTTLE | Refills: 3 | Status: SHIPPED | OUTPATIENT
Start: 2019-09-16 | End: 2019-12-13

## 2019-09-16 ASSESSMENT — ENCOUNTER SYMPTOMS
WHEEZING: 0
COUGH: 0
SHORTNESS OF BREATH: 0

## 2019-09-19 ENCOUNTER — OFFICE VISIT (OUTPATIENT)
Dept: ENT CLINIC | Age: 84
End: 2019-09-19
Payer: MEDICARE

## 2019-09-19 VITALS
DIASTOLIC BLOOD PRESSURE: 74 MMHG | SYSTOLIC BLOOD PRESSURE: 116 MMHG | WEIGHT: 129 LBS | HEART RATE: 68 BPM | HEIGHT: 66 IN | BODY MASS INDEX: 20.73 KG/M2

## 2019-09-19 DIAGNOSIS — J30.9 ALLERGIC RHINITIS, UNSPECIFIED SEASONALITY, UNSPECIFIED TRIGGER: ICD-10-CM

## 2019-09-19 DIAGNOSIS — H61.23 BILATERAL IMPACTED CERUMEN: ICD-10-CM

## 2019-09-19 DIAGNOSIS — R42 VERTIGO: Primary | ICD-10-CM

## 2019-09-19 DIAGNOSIS — H69.82 DYSFUNCTION OF LEFT EUSTACHIAN TUBE: ICD-10-CM

## 2019-09-19 LAB — URINE CULTURE, ROUTINE: NORMAL

## 2019-09-19 PROCEDURE — 4040F PNEUMOC VAC/ADMIN/RCVD: CPT | Performed by: PHYSICIAN ASSISTANT

## 2019-09-19 PROCEDURE — 69210 REMOVE IMPACTED EAR WAX UNI: CPT | Performed by: PHYSICIAN ASSISTANT

## 2019-09-19 PROCEDURE — 1123F ACP DISCUSS/DSCN MKR DOCD: CPT | Performed by: PHYSICIAN ASSISTANT

## 2019-09-19 PROCEDURE — G8427 DOCREV CUR MEDS BY ELIG CLIN: HCPCS | Performed by: PHYSICIAN ASSISTANT

## 2019-09-19 PROCEDURE — 99213 OFFICE O/P EST LOW 20 MIN: CPT | Performed by: PHYSICIAN ASSISTANT

## 2019-09-19 PROCEDURE — G8420 CALC BMI NORM PARAMETERS: HCPCS | Performed by: PHYSICIAN ASSISTANT

## 2019-09-19 PROCEDURE — 1036F TOBACCO NON-USER: CPT | Performed by: PHYSICIAN ASSISTANT

## 2019-09-19 PROCEDURE — 1090F PRES/ABSN URINE INCON ASSESS: CPT | Performed by: PHYSICIAN ASSISTANT

## 2019-09-19 ASSESSMENT — ENCOUNTER SYMPTOMS
EYES NEGATIVE: 1
VOMITING: 0
GASTROINTESTINAL NEGATIVE: 1
RESPIRATORY NEGATIVE: 1
NAUSEA: 0
COUGH: 0
SHORTNESS OF BREATH: 0

## 2019-09-19 NOTE — PROGRESS NOTES
Bottle, Rfl: 3    loratadine (CLARITIN) 5 MG chewable tablet, Take 1 tablet by mouth daily, Disp: 30 tablet, Rfl: 0    meloxicam (MOBIC) 7.5 MG tablet, TAKE ONE TABLET BY MOUTH DAILY, Disp: 90 tablet, Rfl: 0    fluticasone (FLONASE) 50 MCG/ACT nasal spray, 2 sprays by Nasal route daily, Disp: 1 Bottle, Rfl: 3    alendronate (FOSAMAX) 70 MG tablet, Take 1 tablet by mouth every 7 days, Disp: 12 tablet, Rfl: 3    montelukast (SINGULAIR) 10 MG tablet, Take 1 tablet by mouth nightly, Disp: 90 tablet, Rfl: 3    omeprazole (PRILOSEC) 20 MG delayed release capsule, Take 1 capsule by mouth daily, Disp: 90 capsule, Rfl: 3    Umeclidinium Bromide (INCRUSE ELLIPTA) 62.5 MCG/INH AEPB, Inhale 1 puff into the lungs daily, Disp: 3 each, Rfl: 2    Elastic Bandages & Supports (LUMBAR BACK BRACE/SUPPORT PAD) MISC, SI support, Disp: 1 each, Rfl: 0    magnesium oxide (MAG-OX) 400 MG tablet, Take 400 mg by mouth daily, Disp: , Rfl:     Cyanocobalamin (VITAMIN B 12 PO), Take by mouth, Disp: , Rfl:     vitamin D (CHOLECALCIFEROL) 1000 UNIT TABS tablet, Take 1,000 Units by mouth 2 times daily , Disp: , Rfl:     Biotin w/ Vitamins C & E (HAIR/SKIN/NAILS PO), Take by mouth, Disp: , Rfl:     guaiFENesin (MUCINEX) 600 MG extended release tablet, Take 600 mg by mouth daily as needed , Disp: , Rfl:     Multiple Vitamins-Minerals (MULTIVITAMIN ADULT PO), Take by mouth, Disp: , Rfl:     aspirin 81 MG chewable tablet, Take 81 mg by mouth daily, Disp: , Rfl:   Demerol hcl [meperidine] and Statins  Social History     Tobacco Use    Smoking status: Never Smoker    Smokeless tobacco: Never Used   Substance Use Topics    Alcohol use: No    Drug use: No     History reviewed. No pertinent family history. Review of Systems   Constitutional: Negative. Negative for chills and fever. HENT: Positive for hearing loss. Negative for ear discharge, ear pain and tinnitus. Eyes: Negative. Negative for visual disturbance.    Respiratory:

## 2019-09-20 ENCOUNTER — OFFICE VISIT (OUTPATIENT)
Dept: PHYSICAL MEDICINE AND REHAB | Age: 84
End: 2019-09-20
Payer: MEDICARE

## 2019-09-20 VITALS
BODY MASS INDEX: 20.89 KG/M2 | HEIGHT: 66 IN | HEART RATE: 63 BPM | WEIGHT: 130 LBS | SYSTOLIC BLOOD PRESSURE: 132 MMHG | DIASTOLIC BLOOD PRESSURE: 80 MMHG

## 2019-09-20 DIAGNOSIS — M54.6 TRIGGER POINT OF THORACIC REGION: ICD-10-CM

## 2019-09-20 DIAGNOSIS — G89.29 CHRONIC RIGHT-SIDED THORACIC BACK PAIN: ICD-10-CM

## 2019-09-20 DIAGNOSIS — M54.6 CHRONIC RIGHT-SIDED THORACIC BACK PAIN: ICD-10-CM

## 2019-09-20 DIAGNOSIS — M47.814 SPONDYLOSIS OF THORACIC REGION WITHOUT MYELOPATHY OR RADICULOPATHY: Primary | ICD-10-CM

## 2019-09-20 PROCEDURE — 4040F PNEUMOC VAC/ADMIN/RCVD: CPT | Performed by: PHYSICAL MEDICINE & REHABILITATION

## 2019-09-20 PROCEDURE — 99213 OFFICE O/P EST LOW 20 MIN: CPT | Performed by: PHYSICAL MEDICINE & REHABILITATION

## 2019-09-20 PROCEDURE — 20552 NJX 1/MLT TRIGGER POINT 1/2: CPT | Performed by: PHYSICAL MEDICINE & REHABILITATION

## 2019-09-20 PROCEDURE — 1123F ACP DISCUSS/DSCN MKR DOCD: CPT | Performed by: PHYSICAL MEDICINE & REHABILITATION

## 2019-09-20 PROCEDURE — G8427 DOCREV CUR MEDS BY ELIG CLIN: HCPCS | Performed by: PHYSICAL MEDICINE & REHABILITATION

## 2019-09-20 PROCEDURE — G8420 CALC BMI NORM PARAMETERS: HCPCS | Performed by: PHYSICAL MEDICINE & REHABILITATION

## 2019-09-20 PROCEDURE — 1036F TOBACCO NON-USER: CPT | Performed by: PHYSICAL MEDICINE & REHABILITATION

## 2019-09-20 PROCEDURE — 1090F PRES/ABSN URINE INCON ASSESS: CPT | Performed by: PHYSICAL MEDICINE & REHABILITATION

## 2019-09-20 RX ORDER — LIDOCAINE HYDROCHLORIDE 10 MG/ML
2 INJECTION, SOLUTION INFILTRATION; PERINEURAL ONCE
Status: COMPLETED | OUTPATIENT
Start: 2019-09-20 | End: 2019-09-20

## 2019-09-20 RX ADMIN — LIDOCAINE HYDROCHLORIDE 2 ML: 10 INJECTION, SOLUTION INFILTRATION; PERINEURAL at 16:04

## 2019-09-20 NOTE — PATIENT INSTRUCTIONS
Patient Education        Shoulder Blade: Exercises  Your Care Instructions  Here are some examples of typical exercises for your condition. Start each exercise slowly. Ease off the exercise if you start to have pain. Your doctor or physical therapist will tell you when you can start these exercises and which ones will work best for you. How to do the exercises  Shoulder roll    1. Stand tall with your chin slightly tucked. Imagine that a string at the top of your head is pulling you straight up. 2. Keep your arms relaxed. All motion will be in your shoulders. 3. Shrug your shoulders up toward your ears, then up and back. [place] your shoulders down and back, like you're sliding your hands down into your back pants pockets. 4. Repeat the circles at least 2 to 4 times. 5. This exercise is also helpful anytime you want to relax. Lower neck and upper back stretch    1. With your arms about shoulder height, clasp your hands in front of you. 2. Drop your chin toward your chest.  3. Reach straight forward so you are rounding your upper back. Think about pulling your shoulder blades apart. Karis Marx feel a stretch across your upper back and shoulders. Hold for at least 6 seconds. 4. Repeat 2 to 4 times. Triceps stretch    1. Reach your arm straight up. 2. Keeping your elbow in place, bend your arm and reach your hand down behind your back. 3. With your other hand, apply gentle pressure to the bent elbow. Karis Marx feel a stretch at the back of your upper arm and shoulder. Hold about 6 seconds. 4. Repeat 2 to 4 times with each arm. Shoulder stretch    1. Relax your shoulders. 2. Raise one arm to shoulder height, and reach it across your chest.  3. Pull the arm slightly toward you with your other arm. This will help you get a gentle stretch. Hold for about 6 seconds. 4. Repeat 2 to 4 times. Shoulder blade squeeze    1. Sit or stand up tall with your arms at your sides.   2. Keep your shoulders relaxed and

## 2019-09-20 NOTE — PROGRESS NOTES
not currently breastfeeding. GENERAL: The patient is in no apparent distress. Body habitus is nonobese. HEENT: No rhinorrhea, sneezing, yawning, or lacrimation. No scleral icterus or conjunctival injection. SKIN: No piloerection. No tract marks. No rash. PSYCH: Mood and affect are appropriate. Hygiene is appropriate. CARDIOVASCULAR  Heart is regular rate and rhythm. There is no edema. RESPIRATORY: Respirations are regular and unlabored. There is no cyanosis. GASTROINTESTINAL: Soft abdomen, non-tender. MSK: There is no joint effusion, deformity, instability, swelling, erythema or warmth. AROM is full in the spine and extremities. Spinal curvatures reveal a severe kyphoscoliosis. Tender to palpation right infraspinatus and teres major. Trigger point right rhomboid. Negative Drop arm, empty can, Speed, Scarf, Viet-Cavazos. Negative SLR. Tender right SI Joint. + Gillet on right. NEURO: Gait is normal. No focal sensorimotor deficit. Reflexes 2+ and symmetric in lower extremities. Impression:   1. Spondylosis of thoracic region without myelopathy or radiculopathy    2. Trigger point of thoracic region    3. Chronic right-sided thoracic back pain        Plan:  Orders Placed This Encounter   Procedures   Cornell Gonzalez MD, Pain Medicine, [place]     Referral Priority:   Routine     Referral Type:   Eval and Treat     Referral Reason:   Specialty Services Required     Referred to Provider:   Uma Logan MD     Requested Specialty:   Pain Management     Number of Visits Requested:   1    NE INJECT TRIGGER POINT, 1 OR 2     Home exercises for scapular and thoracic muscles provided. Trigger point injection today     The patient was educated about the diagnosis, prognosis, indications, risks and benefits of treatment. An opportunity to ask questions was given to the patient and questions were answered.   The patient agreed to proceed with the recommended treatment as

## 2019-10-17 ENCOUNTER — OFFICE VISIT (OUTPATIENT)
Dept: ENT CLINIC | Age: 84
End: 2019-10-17
Payer: MEDICARE

## 2019-10-17 VITALS
HEART RATE: 68 BPM | BODY MASS INDEX: 21.38 KG/M2 | DIASTOLIC BLOOD PRESSURE: 68 MMHG | HEIGHT: 66 IN | SYSTOLIC BLOOD PRESSURE: 130 MMHG | WEIGHT: 133 LBS

## 2019-10-17 DIAGNOSIS — J30.9 ALLERGIC RHINITIS, UNSPECIFIED SEASONALITY, UNSPECIFIED TRIGGER: ICD-10-CM

## 2019-10-17 DIAGNOSIS — R42 VERTIGO: Primary | ICD-10-CM

## 2019-10-17 PROCEDURE — 4040F PNEUMOC VAC/ADMIN/RCVD: CPT | Performed by: PHYSICIAN ASSISTANT

## 2019-10-17 PROCEDURE — 99212 OFFICE O/P EST SF 10 MIN: CPT | Performed by: PHYSICIAN ASSISTANT

## 2019-10-17 PROCEDURE — 1090F PRES/ABSN URINE INCON ASSESS: CPT | Performed by: PHYSICIAN ASSISTANT

## 2019-10-17 PROCEDURE — 1036F TOBACCO NON-USER: CPT | Performed by: PHYSICIAN ASSISTANT

## 2019-10-17 PROCEDURE — G8484 FLU IMMUNIZE NO ADMIN: HCPCS | Performed by: PHYSICIAN ASSISTANT

## 2019-10-17 PROCEDURE — G8420 CALC BMI NORM PARAMETERS: HCPCS | Performed by: PHYSICIAN ASSISTANT

## 2019-10-17 PROCEDURE — G8427 DOCREV CUR MEDS BY ELIG CLIN: HCPCS | Performed by: PHYSICIAN ASSISTANT

## 2019-10-17 PROCEDURE — 1123F ACP DISCUSS/DSCN MKR DOCD: CPT | Performed by: PHYSICIAN ASSISTANT

## 2019-10-17 ASSESSMENT — ENCOUNTER SYMPTOMS
EYES NEGATIVE: 1
GASTROINTESTINAL NEGATIVE: 1
VOMITING: 0
SHORTNESS OF BREATH: 0
COUGH: 0
RESPIRATORY NEGATIVE: 1
NAUSEA: 0

## 2019-10-30 ENCOUNTER — OFFICE VISIT (OUTPATIENT)
Dept: FAMILY MEDICINE CLINIC | Age: 84
End: 2019-10-30
Payer: MEDICARE

## 2019-10-30 VITALS
HEIGHT: 66 IN | WEIGHT: 130 LBS | HEART RATE: 62 BPM | SYSTOLIC BLOOD PRESSURE: 112 MMHG | BODY MASS INDEX: 20.89 KG/M2 | DIASTOLIC BLOOD PRESSURE: 80 MMHG | OXYGEN SATURATION: 96 % | TEMPERATURE: 97.4 F

## 2019-10-30 DIAGNOSIS — Z00.00 ROUTINE GENERAL MEDICAL EXAMINATION AT A HEALTH CARE FACILITY: Primary | ICD-10-CM

## 2019-10-30 DIAGNOSIS — M81.0 AGE-RELATED OSTEOPOROSIS WITHOUT CURRENT PATHOLOGICAL FRACTURE: ICD-10-CM

## 2019-10-30 DIAGNOSIS — J41.0 SIMPLE CHRONIC BRONCHITIS (HCC): ICD-10-CM

## 2019-10-30 DIAGNOSIS — R39.198 DIFFICULTY URINATING: ICD-10-CM

## 2019-10-30 DIAGNOSIS — G31.84 MILD COGNITIVE IMPAIRMENT: ICD-10-CM

## 2019-10-30 PROCEDURE — G8484 FLU IMMUNIZE NO ADMIN: HCPCS | Performed by: FAMILY MEDICINE

## 2019-10-30 PROCEDURE — 4040F PNEUMOC VAC/ADMIN/RCVD: CPT | Performed by: FAMILY MEDICINE

## 2019-10-30 PROCEDURE — 1123F ACP DISCUSS/DSCN MKR DOCD: CPT | Performed by: FAMILY MEDICINE

## 2019-10-30 PROCEDURE — G0438 PPPS, INITIAL VISIT: HCPCS | Performed by: FAMILY MEDICINE

## 2019-10-30 RX ORDER — DONEPEZIL HYDROCHLORIDE 5 MG/1
5 TABLET, FILM COATED ORAL NIGHTLY
Qty: 30 TABLET | Refills: 5 | Status: SHIPPED
Start: 2019-10-30 | End: 2020-06-10

## 2019-10-30 ASSESSMENT — PATIENT HEALTH QUESTIONNAIRE - PHQ9
SUM OF ALL RESPONSES TO PHQ QUESTIONS 1-9: 0
SUM OF ALL RESPONSES TO PHQ QUESTIONS 1-9: 0

## 2019-10-30 ASSESSMENT — LIFESTYLE VARIABLES: HOW OFTEN DO YOU HAVE A DRINK CONTAINING ALCOHOL: 0

## 2019-11-01 RX ORDER — ALENDRONATE SODIUM 70 MG/1
TABLET ORAL
Qty: 12 TABLET | Refills: 2 | Status: SHIPPED
Start: 2019-11-01 | End: 2020-08-23 | Stop reason: SDUPTHER

## 2019-11-01 RX ORDER — CALCIUM CARBONATE 500(1250)
500 TABLET ORAL DAILY
Qty: 30 TABLET | Refills: 0 | Status: SHIPPED
Start: 2019-11-01 | End: 2020-08-03 | Stop reason: ALTCHOICE

## 2019-12-12 ASSESSMENT — ENCOUNTER SYMPTOMS
NAUSEA: 0
BACK PAIN: 0
ABDOMINAL PAIN: 0
VOMITING: 0
SINUS PRESSURE: 0
SORE THROAT: 0
EYE REDNESS: 0
DIARRHEA: 0
SHORTNESS OF BREATH: 0
COUGH: 0
EYE PAIN: 0

## 2019-12-13 ENCOUNTER — TELEPHONE (OUTPATIENT)
Dept: FAMILY MEDICINE CLINIC | Age: 84
End: 2019-12-13

## 2019-12-13 ENCOUNTER — OFFICE VISIT (OUTPATIENT)
Dept: FAMILY MEDICINE CLINIC | Age: 84
End: 2019-12-13
Payer: MEDICARE

## 2019-12-13 ENCOUNTER — HOSPITAL ENCOUNTER (OUTPATIENT)
Age: 84
Discharge: HOME | End: 2019-12-15
Payer: MEDICARE

## 2019-12-13 VITALS
HEART RATE: 67 BPM | WEIGHT: 132 LBS | BODY MASS INDEX: 21.21 KG/M2 | DIASTOLIC BLOOD PRESSURE: 66 MMHG | OXYGEN SATURATION: 96 % | SYSTOLIC BLOOD PRESSURE: 108 MMHG | HEIGHT: 66 IN

## 2019-12-13 DIAGNOSIS — R00.2 PALPITATIONS: ICD-10-CM

## 2019-12-13 DIAGNOSIS — R53.83 FATIGUE, UNSPECIFIED TYPE: Primary | ICD-10-CM

## 2019-12-13 DIAGNOSIS — J44.9 CHRONIC OBSTRUCTIVE PULMONARY DISEASE, UNSPECIFIED COPD TYPE (HCC): ICD-10-CM

## 2019-12-13 DIAGNOSIS — G31.84 MILD COGNITIVE IMPAIRMENT: ICD-10-CM

## 2019-12-13 DIAGNOSIS — J30.89 ALLERGIC RHINITIS DUE TO OTHER ALLERGIC TRIGGER, UNSPECIFIED SEASONALITY: ICD-10-CM

## 2019-12-13 DIAGNOSIS — R53.83 FATIGUE, UNSPECIFIED TYPE: ICD-10-CM

## 2019-12-13 LAB
ALBUMIN SERPL-MCNC: 4.2 G/DL (ref 3.5–5.2)
ALP BLD-CCNC: 66 U/L (ref 35–104)
ALT SERPL-CCNC: 16 U/L (ref 0–32)
ANION GAP SERPL CALCULATED.3IONS-SCNC: 13 MMOL/L (ref 7–16)
AST SERPL-CCNC: 52 U/L (ref 0–31)
BASOPHILS ABSOLUTE: 0.02 E9/L (ref 0–0.2)
BASOPHILS RELATIVE PERCENT: 0.4 % (ref 0–2)
BILIRUB SERPL-MCNC: 0.4 MG/DL (ref 0–1.2)
BUN BLDV-MCNC: 13 MG/DL (ref 8–23)
CALCIUM SERPL-MCNC: 9.8 MG/DL (ref 8.6–10.2)
CHLORIDE BLD-SCNC: 103 MMOL/L (ref 98–107)
CO2: 24 MMOL/L (ref 22–29)
CREAT SERPL-MCNC: 0.6 MG/DL (ref 0.5–1)
EOSINOPHILS ABSOLUTE: 0.1 E9/L (ref 0.05–0.5)
EOSINOPHILS RELATIVE PERCENT: 1.8 % (ref 0–6)
FOLATE: >20 NG/ML (ref 4.8–24.2)
GFR AFRICAN AMERICAN: >60
GFR NON-AFRICAN AMERICAN: >60 ML/MIN/1.73
GLUCOSE FASTING: 136 MG/DL (ref 74–99)
HCT VFR BLD CALC: 43.4 % (ref 34–48)
HEMOGLOBIN: 13.5 G/DL (ref 11.5–15.5)
IMMATURE GRANULOCYTES #: 0.02 E9/L
IMMATURE GRANULOCYTES %: 0.4 % (ref 0–5)
LYMPHOCYTES ABSOLUTE: 1.55 E9/L (ref 1.5–4)
LYMPHOCYTES RELATIVE PERCENT: 27.9 % (ref 20–42)
MAGNESIUM: 2.3 MG/DL (ref 1.6–2.6)
MCH RBC QN AUTO: 29.3 PG (ref 26–35)
MCHC RBC AUTO-ENTMCNC: 31.1 % (ref 32–34.5)
MCV RBC AUTO: 94.3 FL (ref 80–99.9)
MONOCYTES ABSOLUTE: 0.38 E9/L (ref 0.1–0.95)
MONOCYTES RELATIVE PERCENT: 6.8 % (ref 2–12)
NEUTROPHILS ABSOLUTE: 3.49 E9/L (ref 1.8–7.3)
NEUTROPHILS RELATIVE PERCENT: 62.7 % (ref 43–80)
PDW BLD-RTO: 13.1 FL (ref 11.5–15)
PLATELET # BLD: 30 E9/L (ref 130–450)
PLATELET CONFIRMATION: NORMAL
PMV BLD AUTO: 13.2 FL (ref 7–12)
POTASSIUM SERPL-SCNC: 5.2 MMOL/L (ref 3.5–5)
RBC # BLD: 4.6 E12/L (ref 3.5–5.5)
SODIUM BLD-SCNC: 140 MMOL/L (ref 132–146)
T4 FREE: 1.12 NG/DL (ref 0.93–1.7)
TOTAL PROTEIN: 7.2 G/DL (ref 6.4–8.3)
TSH SERPL DL<=0.05 MIU/L-ACNC: 1.8 UIU/ML (ref 0.27–4.2)
VITAMIN B-12: 1028 PG/ML (ref 211–946)
WBC # BLD: 5.6 E9/L (ref 4.5–11.5)

## 2019-12-13 PROCEDURE — 83735 ASSAY OF MAGNESIUM: CPT

## 2019-12-13 PROCEDURE — G8427 DOCREV CUR MEDS BY ELIG CLIN: HCPCS | Performed by: PHYSICIAN ASSISTANT

## 2019-12-13 PROCEDURE — 1036F TOBACCO NON-USER: CPT | Performed by: PHYSICIAN ASSISTANT

## 2019-12-13 PROCEDURE — G8484 FLU IMMUNIZE NO ADMIN: HCPCS | Performed by: PHYSICIAN ASSISTANT

## 2019-12-13 PROCEDURE — 82746 ASSAY OF FOLIC ACID SERUM: CPT

## 2019-12-13 PROCEDURE — 4040F PNEUMOC VAC/ADMIN/RCVD: CPT | Performed by: PHYSICIAN ASSISTANT

## 2019-12-13 PROCEDURE — 84439 ASSAY OF FREE THYROXINE: CPT

## 2019-12-13 PROCEDURE — 3023F SPIROM DOC REV: CPT | Performed by: PHYSICIAN ASSISTANT

## 2019-12-13 PROCEDURE — 1123F ACP DISCUSS/DSCN MKR DOCD: CPT | Performed by: PHYSICIAN ASSISTANT

## 2019-12-13 PROCEDURE — G8926 SPIRO NO PERF OR DOC: HCPCS | Performed by: PHYSICIAN ASSISTANT

## 2019-12-13 PROCEDURE — 82607 VITAMIN B-12: CPT

## 2019-12-13 PROCEDURE — 84443 ASSAY THYROID STIM HORMONE: CPT

## 2019-12-13 PROCEDURE — 1090F PRES/ABSN URINE INCON ASSESS: CPT | Performed by: PHYSICIAN ASSISTANT

## 2019-12-13 PROCEDURE — 99214 OFFICE O/P EST MOD 30 MIN: CPT | Performed by: PHYSICIAN ASSISTANT

## 2019-12-13 PROCEDURE — 36415 COLL VENOUS BLD VENIPUNCTURE: CPT

## 2019-12-13 PROCEDURE — 85025 COMPLETE CBC W/AUTO DIFF WBC: CPT

## 2019-12-13 PROCEDURE — 80053 COMPREHEN METABOLIC PANEL: CPT

## 2019-12-13 PROCEDURE — G8420 CALC BMI NORM PARAMETERS: HCPCS | Performed by: PHYSICIAN ASSISTANT

## 2019-12-13 ASSESSMENT — ENCOUNTER SYMPTOMS
CHEST TIGHTNESS: 0
SINUS PAIN: 0
RHINORRHEA: 1
TROUBLE SWALLOWING: 0
PHOTOPHOBIA: 0

## 2019-12-14 ENCOUNTER — HOSPITAL ENCOUNTER (OUTPATIENT)
Age: 84
Discharge: HOME | End: 2019-12-16
Payer: MEDICARE

## 2019-12-14 ENCOUNTER — TELEPHONE (OUTPATIENT)
Dept: FAMILY MEDICINE CLINIC | Age: 84
End: 2019-12-14

## 2019-12-14 LAB
ALBUMIN SERPL-MCNC: 4.2 G/DL (ref 3.5–5.2)
ALP BLD-CCNC: 64 U/L (ref 35–104)
ALT SERPL-CCNC: 13 U/L (ref 0–32)
ANION GAP SERPL CALCULATED.3IONS-SCNC: 10 MMOL/L (ref 7–16)
AST SERPL-CCNC: 16 U/L (ref 0–31)
BASOPHILS ABSOLUTE: 0.01 E9/L (ref 0–0.2)
BASOPHILS RELATIVE PERCENT: 0.2 % (ref 0–2)
BILIRUB SERPL-MCNC: 0.6 MG/DL (ref 0–1.2)
BUN BLDV-MCNC: 12 MG/DL (ref 8–23)
CALCIUM SERPL-MCNC: 9.5 MG/DL (ref 8.6–10.2)
CHLORIDE BLD-SCNC: 97 MMOL/L (ref 98–107)
CO2: 27 MMOL/L (ref 22–29)
CREAT SERPL-MCNC: 0.6 MG/DL (ref 0.5–1)
EOSINOPHILS ABSOLUTE: 0.12 E9/L (ref 0.05–0.5)
EOSINOPHILS RELATIVE PERCENT: 2.9 % (ref 0–6)
GFR AFRICAN AMERICAN: >60
GFR NON-AFRICAN AMERICAN: >60 ML/MIN/1.73
GLUCOSE BLD-MCNC: 102 MG/DL (ref 74–99)
HCT VFR BLD CALC: 40.3 % (ref 34–48)
HEMOGLOBIN: 12.9 G/DL (ref 11.5–15.5)
IMMATURE GRANULOCYTES #: 0.02 E9/L
IMMATURE GRANULOCYTES %: 0.5 % (ref 0–5)
INR BLD: 1
LYMPHOCYTES ABSOLUTE: 1.47 E9/L (ref 1.5–4)
LYMPHOCYTES RELATIVE PERCENT: 35.1 % (ref 20–42)
MCH RBC QN AUTO: 29.1 PG (ref 26–35)
MCHC RBC AUTO-ENTMCNC: 32 % (ref 32–34.5)
MCV RBC AUTO: 90.8 FL (ref 80–99.9)
MONOCYTES ABSOLUTE: 0.37 E9/L (ref 0.1–0.95)
MONOCYTES RELATIVE PERCENT: 8.8 % (ref 2–12)
NEUTROPHILS ABSOLUTE: 2.2 E9/L (ref 1.8–7.3)
NEUTROPHILS RELATIVE PERCENT: 52.5 % (ref 43–80)
PDW BLD-RTO: 12.8 FL (ref 11.5–15)
PLATELET # BLD: 147 E9/L (ref 130–450)
PMV BLD AUTO: 11.5 FL (ref 7–12)
POTASSIUM SERPL-SCNC: 3.8 MMOL/L (ref 3.5–5)
PROTHROMBIN TIME: 11.8 SEC (ref 9.3–12.4)
RBC # BLD: 4.44 E12/L (ref 3.5–5.5)
SODIUM BLD-SCNC: 134 MMOL/L (ref 132–146)
TOTAL PROTEIN: 6.3 G/DL (ref 6.4–8.3)
WBC # BLD: 4.2 E9/L (ref 4.5–11.5)

## 2019-12-14 PROCEDURE — 85610 PROTHROMBIN TIME: CPT

## 2019-12-14 PROCEDURE — 85025 COMPLETE CBC W/AUTO DIFF WBC: CPT

## 2019-12-14 PROCEDURE — 80053 COMPREHEN METABOLIC PANEL: CPT

## 2020-02-03 RX ORDER — OMEPRAZOLE 20 MG/1
CAPSULE, DELAYED RELEASE ORAL
Qty: 90 CAPSULE | Refills: 0 | Status: SHIPPED
Start: 2020-02-03 | End: 2020-06-01 | Stop reason: ALTCHOICE

## 2020-02-03 RX ORDER — MONTELUKAST SODIUM 10 MG/1
TABLET ORAL
Qty: 90 TABLET | Refills: 0 | Status: SHIPPED
Start: 2020-02-03 | End: 2020-03-09 | Stop reason: CLARIF

## 2020-03-09 ENCOUNTER — OFFICE VISIT (OUTPATIENT)
Dept: FAMILY MEDICINE CLINIC | Age: 85
End: 2020-03-09
Payer: MEDICARE

## 2020-03-09 VITALS
SYSTOLIC BLOOD PRESSURE: 124 MMHG | HEIGHT: 66 IN | RESPIRATION RATE: 14 BRPM | BODY MASS INDEX: 21.05 KG/M2 | DIASTOLIC BLOOD PRESSURE: 58 MMHG | HEART RATE: 68 BPM | OXYGEN SATURATION: 95 % | WEIGHT: 131 LBS

## 2020-03-09 PROCEDURE — 1123F ACP DISCUSS/DSCN MKR DOCD: CPT | Performed by: FAMILY MEDICINE

## 2020-03-09 PROCEDURE — G8420 CALC BMI NORM PARAMETERS: HCPCS | Performed by: FAMILY MEDICINE

## 2020-03-09 PROCEDURE — 99213 OFFICE O/P EST LOW 20 MIN: CPT | Performed by: FAMILY MEDICINE

## 2020-03-09 PROCEDURE — 4040F PNEUMOC VAC/ADMIN/RCVD: CPT | Performed by: FAMILY MEDICINE

## 2020-03-09 PROCEDURE — G8484 FLU IMMUNIZE NO ADMIN: HCPCS | Performed by: FAMILY MEDICINE

## 2020-03-09 PROCEDURE — 81003 URINALYSIS AUTO W/O SCOPE: CPT | Performed by: FAMILY MEDICINE

## 2020-03-09 PROCEDURE — 1090F PRES/ABSN URINE INCON ASSESS: CPT | Performed by: FAMILY MEDICINE

## 2020-03-09 PROCEDURE — 1036F TOBACCO NON-USER: CPT | Performed by: FAMILY MEDICINE

## 2020-03-09 PROCEDURE — G8427 DOCREV CUR MEDS BY ELIG CLIN: HCPCS | Performed by: FAMILY MEDICINE

## 2020-03-09 ASSESSMENT — ENCOUNTER SYMPTOMS
COUGH: 0
SHORTNESS OF BREATH: 0
WHEEZING: 0

## 2020-03-09 NOTE — PATIENT INSTRUCTIONS
Stop the singulair, aspirin, aricept , prilosec   Get the urine and blood work done   Follow up in 2 weeks or sooner as needed.

## 2020-03-10 ENCOUNTER — TELEPHONE (OUTPATIENT)
Dept: FAMILY MEDICINE CLINIC | Age: 85
End: 2020-03-10

## 2020-03-10 NOTE — TELEPHONE ENCOUNTER
pts daughter called and wanted to let you know that the pt states she started having bad diarrhea that started during the night. She was seen yesterday for body aches and extreme exhaustion. Please advise, thank you.

## 2020-03-11 ENCOUNTER — HOSPITAL ENCOUNTER (OUTPATIENT)
Age: 85
Discharge: HOME | End: 2020-03-13
Payer: MEDICARE

## 2020-03-11 LAB
BACTERIA: ABNORMAL /HPF
BILIRUBIN URINE: NEGATIVE
BLOOD, URINE: NEGATIVE
CLARITY: CLEAR
COLOR: YELLOW
EPITHELIAL CELLS, UA: ABNORMAL /HPF
GLUCOSE URINE: NEGATIVE MG/DL
KETONES, URINE: NEGATIVE MG/DL
LEUKOCYTE ESTERASE, URINE: ABNORMAL
NITRITE, URINE: NEGATIVE
PH UA: 6.5 (ref 5–9)
PROTEIN UA: NEGATIVE MG/DL
RBC UA: ABNORMAL /HPF (ref 0–2)
SPECIFIC GRAVITY UA: 1.01 (ref 1–1.03)
UROBILINOGEN, URINE: 0.2 E.U./DL
WBC UA: ABNORMAL /HPF (ref 0–5)

## 2020-03-11 PROCEDURE — 81001 URINALYSIS AUTO W/SCOPE: CPT

## 2020-03-11 PROCEDURE — 87088 URINE BACTERIA CULTURE: CPT

## 2020-03-13 LAB — URINE CULTURE, ROUTINE: NORMAL

## 2020-03-19 ENCOUNTER — TELEPHONE (OUTPATIENT)
Dept: FAMILY MEDICINE CLINIC | Age: 85
End: 2020-03-19

## 2020-05-04 ENCOUNTER — TELEPHONE (OUTPATIENT)
Dept: PHYSICAL MEDICINE AND REHAB | Age: 85
End: 2020-05-04

## 2020-05-04 NOTE — TELEPHONE ENCOUNTER
Pts daughter called in to try and get pt scheduled asap for hip/ lower back pain. She said she is putting salon pas on every day but her hips are still bothering her and it is causing balance issues. [facility] did say they are capable of video visits if necessary.  Please advise when to schedule pt thanks

## 2020-05-12 ENCOUNTER — TELEMEDICINE (OUTPATIENT)
Dept: PHYSICAL MEDICINE AND REHAB | Age: 85
End: 2020-05-12
Payer: MEDICARE

## 2020-05-12 PROCEDURE — 4040F PNEUMOC VAC/ADMIN/RCVD: CPT | Performed by: PHYSICAL MEDICINE & REHABILITATION

## 2020-05-12 PROCEDURE — G8428 CUR MEDS NOT DOCUMENT: HCPCS | Performed by: PHYSICAL MEDICINE & REHABILITATION

## 2020-05-12 PROCEDURE — 99214 OFFICE O/P EST MOD 30 MIN: CPT | Performed by: PHYSICAL MEDICINE & REHABILITATION

## 2020-05-12 PROCEDURE — 1123F ACP DISCUSS/DSCN MKR DOCD: CPT | Performed by: PHYSICAL MEDICINE & REHABILITATION

## 2020-05-12 PROCEDURE — 1090F PRES/ABSN URINE INCON ASSESS: CPT | Performed by: PHYSICAL MEDICINE & REHABILITATION

## 2020-05-12 RX ORDER — MELOXICAM 7.5 MG/1
TABLET ORAL
Qty: 90 TABLET | Refills: 0 | Status: SHIPPED
Start: 2020-05-12 | End: 2020-08-03 | Stop reason: ALTCHOICE

## 2020-05-12 NOTE — PROGRESS NOTES
Allergen Reactions    Demerol Hcl [Meperidine]      Other reaction(s): Intolerance  \"went into shok\"    Statins Other (See Comments)       Review of Systems:  No new weakness, paresthesia, incontinence of bowel or bladder, saddle anesthesia, falls or gait dysfunction. Otherwise, per HPI. Physical Exam:   Respiratory rate is 20. GENERAL: The patient is in no apparent distress. Body habitus is non-obese. HEENT: No rhinorrhea, sneezing, yawning, or lacrimation. No scleral icterus or conjunctival injection. SKIN: No piloerection. No tract marks. No rash. PSYCH: Mood and affect are appropriate. Hygiene appears appropriate. CARDIOVASCULAR There is no edema. RESPIRATORY: Respirations are regular and unlabored. There is no cyanosis. GASTROINTESTINAL: Soft abdomen, non-tender (patient elicited)  MSK: Spinal curvatures were normal in standing. Pelvis was level in standing. Active range of motion was full. There was no obvious joint effusion, deformity. The patient was able to elicit tenderness at right gluteal region. The patient reports the lumbar spine and hip does not fell warm and there is no visible redness. Neurologic: Awake, alert and oriented in three planes. Speech is fluent. No facial weakness. Tongue is midline. Hearing is intact for conversation. Pupils are equal and round. Extraocular muscles appear intact during visual tracking. Shoulder shrug symmetric. Sensation: Intact for light touch (patient elicited) in all upper and lower extremity dermatomes. Strength: Functional upper extremity strength testing was observed to be at least antigravity and lower extremity strength testing including heel and toe walking as well as duck walking were intact, unable to manual muscle test given environment. There was no observable atrophy or side to size difference in muscle bulk. Muscle Tendon Reflexes: unable to test given environment. Gait is Antalgic. Romberg is negative.    No observed abnormal muscle tone. The patient was able to rise from a chair and squat without difficulty. There is no tremor. Due to this being a TeleHealth encounter, evaluation of the following organ systems is limited: Vitals/Constitutional/EENT/Resp/CV/GI//MS/Neuro/Skin/Heme-Lymph-Imm. Impression:   1. Spondylosis of thoracic region without myelopathy or radiculopathy    2. Trigger point of thoracic region    3. Chronic right-sided thoracic back pain    4. Sacroiliac joint dysfunction of right side        Plan:    Orders Placed This Encounter   Medications    meloxicam (MOBIC) 7.5 MG tablet     Sig: TAKE ONE TABLET BY MOUTH DAILY     Dispense:  90 tablet     Refill:  0       Medications Discontinued During This Encounter   Medication Reason    meloxicam (MOBIC) 7.5 MG tablet REORDER     Home exercises provided. SI belt. The patient was educated about the diagnosis, prognosis, indications, risks and benefits of treatment. An opportunity to ask questions was given to the patient and questions were answered. The patient agreed to proceed with the recommended treatment as described above. Follow up 3 months or prn     Thank you for allowing me to participate in the care of your patient. Bowen Mittal D.O., P.T.   Board Certified Physical Medicine and Rehabilitation  Board Certified Electrodiagnostic Medicine

## 2020-05-29 ENCOUNTER — TELEPHONE (OUTPATIENT)
Dept: PHYSICAL MEDICINE AND REHAB | Age: 85
End: 2020-05-29

## 2020-06-01 ENCOUNTER — OFFICE VISIT (OUTPATIENT)
Dept: PHYSICAL MEDICINE AND REHAB | Age: 85
End: 2020-06-01
Payer: MEDICARE

## 2020-06-01 ENCOUNTER — HOSPITAL ENCOUNTER (OUTPATIENT)
Age: 85
Discharge: HOME | End: 2020-06-03
Payer: MEDICARE

## 2020-06-01 VITALS
DIASTOLIC BLOOD PRESSURE: 70 MMHG | WEIGHT: 129 LBS | HEART RATE: 60 BPM | HEIGHT: 65 IN | BODY MASS INDEX: 21.49 KG/M2 | TEMPERATURE: 97.8 F | SYSTOLIC BLOOD PRESSURE: 106 MMHG

## 2020-06-01 LAB
ANION GAP SERPL CALCULATED.3IONS-SCNC: 14 MMOL/L (ref 7–16)
BUN BLDV-MCNC: 16 MG/DL (ref 8–23)
CALCIUM SERPL-MCNC: 9.7 MG/DL (ref 8.6–10.2)
CHLORIDE BLD-SCNC: 101 MMOL/L (ref 98–107)
CO2: 26 MMOL/L (ref 22–29)
CREAT SERPL-MCNC: 0.6 MG/DL (ref 0.5–1)
GFR AFRICAN AMERICAN: >60
GFR NON-AFRICAN AMERICAN: >60 ML/MIN/1.73
GLUCOSE BLD-MCNC: 102 MG/DL (ref 74–99)
HCT VFR BLD CALC: 45.8 % (ref 34–48)
HEMOGLOBIN: 14.4 G/DL (ref 11.5–15.5)
MCH RBC QN AUTO: 29.8 PG (ref 26–35)
MCHC RBC AUTO-ENTMCNC: 31.4 % (ref 32–34.5)
MCV RBC AUTO: 94.6 FL (ref 80–99.9)
PDW BLD-RTO: 13.4 FL (ref 11.5–15)
PLATELET # BLD: 172 E9/L (ref 130–450)
PMV BLD AUTO: 12 FL (ref 7–12)
POTASSIUM SERPL-SCNC: 4 MMOL/L (ref 3.5–5)
RBC # BLD: 4.84 E12/L (ref 3.5–5.5)
SODIUM BLD-SCNC: 141 MMOL/L (ref 132–146)
WBC # BLD: 5.9 E9/L (ref 4.5–11.5)

## 2020-06-01 PROCEDURE — 36415 COLL VENOUS BLD VENIPUNCTURE: CPT

## 2020-06-01 PROCEDURE — 76942 ECHO GUIDE FOR BIOPSY: CPT | Performed by: PHYSICAL MEDICINE & REHABILITATION

## 2020-06-01 PROCEDURE — 80048 BASIC METABOLIC PNL TOTAL CA: CPT

## 2020-06-01 PROCEDURE — 85027 COMPLETE CBC AUTOMATED: CPT

## 2020-06-01 PROCEDURE — 20552 NJX 1/MLT TRIGGER POINT 1/2: CPT | Performed by: PHYSICAL MEDICINE & REHABILITATION

## 2020-06-01 RX ORDER — TRIAMCINOLONE ACETONIDE 40 MG/ML
40 INJECTION, SUSPENSION INTRA-ARTICULAR; INTRAMUSCULAR ONCE
Status: COMPLETED | OUTPATIENT
Start: 2020-06-01 | End: 2020-06-01

## 2020-06-01 RX ORDER — LIDOCAINE HYDROCHLORIDE 10 MG/ML
5 INJECTION, SOLUTION INFILTRATION; PERINEURAL ONCE
Status: COMPLETED | OUTPATIENT
Start: 2020-06-01 | End: 2020-06-01

## 2020-06-01 RX ADMIN — TRIAMCINOLONE ACETONIDE 40 MG: 40 INJECTION, SUSPENSION INTRA-ARTICULAR; INTRAMUSCULAR at 10:25

## 2020-06-01 RX ADMIN — LIDOCAINE HYDROCHLORIDE 5 ML: 10 INJECTION, SOLUTION INFILTRATION; PERINEURAL at 10:24

## 2020-06-09 ENCOUNTER — TELEPHONE (OUTPATIENT)
Dept: PHYSICAL MEDICINE AND REHAB | Age: 85
End: 2020-06-09

## 2020-07-29 ENCOUNTER — TELEPHONE (OUTPATIENT)
Dept: FAMILY MEDICINE CLINIC | Age: 85
End: 2020-07-29

## 2020-07-29 ENCOUNTER — APPOINTMENT (OUTPATIENT)
Dept: GENERAL RADIOLOGY | Age: 85
End: 2020-07-29
Payer: MEDICARE

## 2020-07-29 ENCOUNTER — HOSPITAL ENCOUNTER (EMERGENCY)
Age: 85
Discharge: HOME | End: 2020-07-29
Attending: EMERGENCY MEDICINE
Payer: MEDICARE

## 2020-07-29 VITALS
HEART RATE: 61 BPM | SYSTOLIC BLOOD PRESSURE: 125 MMHG | OXYGEN SATURATION: 96 % | HEIGHT: 65 IN | BODY MASS INDEX: 21.49 KG/M2 | TEMPERATURE: 97.2 F | RESPIRATION RATE: 20 BRPM | DIASTOLIC BLOOD PRESSURE: 76 MMHG | WEIGHT: 129 LBS

## 2020-07-29 LAB
ALBUMIN SERPL-MCNC: 4.2 G/DL (ref 3.5–5.2)
ALP BLD-CCNC: 62 U/L (ref 35–104)
ALT SERPL-CCNC: 12 U/L (ref 0–32)
ANION GAP SERPL CALCULATED.3IONS-SCNC: 12 MMOL/L (ref 7–16)
APTT: 31.1 SEC (ref 24.5–35.1)
AST SERPL-CCNC: 16 U/L (ref 0–31)
BACTERIA: ABNORMAL /HPF
BASOPHILS ABSOLUTE: 0.02 E9/L (ref 0–0.2)
BASOPHILS RELATIVE PERCENT: 0.3 % (ref 0–2)
BILIRUB SERPL-MCNC: 0.3 MG/DL (ref 0–1.2)
BILIRUBIN URINE: NEGATIVE
BLOOD, URINE: NEGATIVE
BUN BLDV-MCNC: 11 MG/DL (ref 8–23)
CALCIUM SERPL-MCNC: 9.5 MG/DL (ref 8.6–10.2)
CHLORIDE BLD-SCNC: 101 MMOL/L (ref 98–107)
CLARITY: CLEAR
CO2: 27 MMOL/L (ref 22–29)
COLOR: YELLOW
CREAT SERPL-MCNC: 0.6 MG/DL (ref 0.5–1)
EKG ATRIAL RATE: 67 BPM
EKG P AXIS: 54 DEGREES
EKG P-R INTERVAL: 126 MS
EKG Q-T INTERVAL: 402 MS
EKG QRS DURATION: 92 MS
EKG QTC CALCULATION (BAZETT): 424 MS
EKG R AXIS: 81 DEGREES
EKG T AXIS: 56 DEGREES
EKG VENTRICULAR RATE: 67 BPM
EOSINOPHILS ABSOLUTE: 0.11 E9/L (ref 0.05–0.5)
EOSINOPHILS RELATIVE PERCENT: 1.9 % (ref 0–6)
EPITHELIAL CELLS, UA: ABNORMAL /HPF
GFR AFRICAN AMERICAN: >60
GFR NON-AFRICAN AMERICAN: >60 ML/MIN/1.73
GLUCOSE BLD-MCNC: 83 MG/DL (ref 74–99)
GLUCOSE URINE: NEGATIVE MG/DL
HCT VFR BLD CALC: 43.2 % (ref 34–48)
HEMOGLOBIN: 14.2 G/DL (ref 11.5–15.5)
IMMATURE GRANULOCYTES #: 0.02 E9/L
IMMATURE GRANULOCYTES %: 0.3 % (ref 0–5)
INR BLD: 1.1
KETONES, URINE: NEGATIVE MG/DL
LEUKOCYTE ESTERASE, URINE: ABNORMAL
LYMPHOCYTES ABSOLUTE: 1.41 E9/L (ref 1.5–4)
LYMPHOCYTES RELATIVE PERCENT: 24.3 % (ref 20–42)
MAGNESIUM: 2.2 MG/DL (ref 1.6–2.6)
MCH RBC QN AUTO: 30.5 PG (ref 26–35)
MCHC RBC AUTO-ENTMCNC: 32.9 % (ref 32–34.5)
MCV RBC AUTO: 92.9 FL (ref 80–99.9)
MONOCYTES ABSOLUTE: 0.51 E9/L (ref 0.1–0.95)
MONOCYTES RELATIVE PERCENT: 8.8 % (ref 2–12)
NEUTROPHILS ABSOLUTE: 3.74 E9/L (ref 1.8–7.3)
NEUTROPHILS RELATIVE PERCENT: 64.4 % (ref 43–80)
NITRITE, URINE: NEGATIVE
PDW BLD-RTO: 13.1 FL (ref 11.5–15)
PH UA: 7.5 (ref 5–9)
PLATELET # BLD: 193 E9/L (ref 130–450)
PMV BLD AUTO: 11.1 FL (ref 7–12)
POTASSIUM SERPL-SCNC: 4 MMOL/L (ref 3.5–5)
PRO-BNP: 303 PG/ML (ref 0–450)
PROTEIN UA: NEGATIVE MG/DL
PROTHROMBIN TIME: 12.1 SEC (ref 9.3–12.4)
RBC # BLD: 4.65 E12/L (ref 3.5–5.5)
RBC UA: ABNORMAL /HPF (ref 0–2)
SODIUM BLD-SCNC: 140 MMOL/L (ref 132–146)
SPECIFIC GRAVITY UA: 1.01 (ref 1–1.03)
TOTAL PROTEIN: 6.5 G/DL (ref 6.4–8.3)
TROPONIN: <0.01 NG/ML (ref 0–0.03)
UROBILINOGEN, URINE: 0.2 E.U./DL
WBC # BLD: 5.8 E9/L (ref 4.5–11.5)
WBC UA: ABNORMAL /HPF (ref 0–5)

## 2020-07-29 PROCEDURE — 85025 COMPLETE CBC W/AUTO DIFF WBC: CPT

## 2020-07-29 PROCEDURE — 85610 PROTHROMBIN TIME: CPT

## 2020-07-29 PROCEDURE — 81001 URINALYSIS AUTO W/SCOPE: CPT

## 2020-07-29 PROCEDURE — 85730 THROMBOPLASTIN TIME PARTIAL: CPT

## 2020-07-29 PROCEDURE — 83880 ASSAY OF NATRIURETIC PEPTIDE: CPT

## 2020-07-29 PROCEDURE — 36415 COLL VENOUS BLD VENIPUNCTURE: CPT

## 2020-07-29 PROCEDURE — 80053 COMPREHEN METABOLIC PANEL: CPT

## 2020-07-29 PROCEDURE — 71045 X-RAY EXAM CHEST 1 VIEW: CPT

## 2020-07-29 PROCEDURE — 84484 ASSAY OF TROPONIN QUANT: CPT

## 2020-07-29 PROCEDURE — 99285 EMERGENCY DEPT VISIT HI MDM: CPT

## 2020-07-29 PROCEDURE — 93005 ELECTROCARDIOGRAM TRACING: CPT | Performed by: EMERGENCY MEDICINE

## 2020-07-29 PROCEDURE — 93010 ELECTROCARDIOGRAM REPORT: CPT | Performed by: INTERNAL MEDICINE

## 2020-07-29 PROCEDURE — 83735 ASSAY OF MAGNESIUM: CPT

## 2020-07-29 RX ORDER — MECLIZINE HCL 12.5 MG/1
12.5 TABLET ORAL 3 TIMES DAILY PRN
COMMUNITY

## 2020-07-29 RX ORDER — AZELASTINE 1 MG/ML
1 SPRAY, METERED NASAL 2 TIMES DAILY
COMMUNITY
End: 2020-08-03 | Stop reason: ALTCHOICE

## 2020-07-29 ASSESSMENT — ENCOUNTER SYMPTOMS
VOMITING: 0
RHINORRHEA: 1
BACK PAIN: 0
ABDOMINAL PAIN: 0
SHORTNESS OF BREATH: 1
CHEST TIGHTNESS: 0
NAUSEA: 0
ABDOMINAL DISTENTION: 0
DIARRHEA: 0
COUGH: 1
WHEEZING: 0
SORE THROAT: 0

## 2020-07-29 NOTE — ED NOTES
Bed: H2  Expected date:   Expected time:   Means of arrival:   Comments:     Enio Skelton RN  07/29/20 0695

## 2020-07-29 NOTE — ED PROVIDER NOTES
headaches. Hematological: Negative for adenopathy. All other systems reviewed and are negative. Physical Exam  Vitals signs and nursing note reviewed. Constitutional:       General: She is not in acute distress. Appearance: She is well-developed. She is not ill-appearing, toxic-appearing or diaphoretic. HENT:      Head: Normocephalic and atraumatic. Nose: Mucosal edema and rhinorrhea present. No congestion. Eyes:      Pupils: Pupils are equal, round, and reactive to light. Neck:      Musculoskeletal: Normal range of motion and neck supple. Normal range of motion. No neck rigidity, injury, pain with movement, spinous process tenderness or muscular tenderness. Vascular: No JVD. Trachea: Trachea and phonation normal.   Cardiovascular:      Rate and Rhythm: Normal rate and regular rhythm. Heart sounds: Normal heart sounds. No murmur. Pulmonary:      Effort: Pulmonary effort is normal. No respiratory distress. Breath sounds: Normal breath sounds. No stridor, decreased air movement or transmitted upper airway sounds. No decreased breath sounds, wheezing, rhonchi or rales. Chest:      Chest wall: No tenderness. Abdominal:      General: Bowel sounds are normal. There is no distension. Palpations: Abdomen is soft. Tenderness: There is no abdominal tenderness. There is no right CVA tenderness, left CVA tenderness, guarding or rebound. Musculoskeletal:         General: No swelling, tenderness, deformity or signs of injury. Right lower leg: No edema. Left lower leg: No edema. Comments: There is no pretibial edema nor calf tenderness bilaterally       Skin:     General: Skin is warm and dry. Coloration: Skin is not jaundiced or pale. Findings: No erythema or rash. Neurological:      General: No focal deficit present. Mental Status: She is alert and oriented to person, place, and time. GCS: GCS eye subscore is 4.  GCS verbal subscore is 5. GCS motor subscore is 6. Cranial Nerves: No cranial nerve deficit. Coordination: Coordination normal.          Procedures     [facility]     ED Course as of Jul 29 1537 Wed Jul 29, 2020   1532 Patient sitting the bed resting comfortably no distress. Has had unremarkable exam throughout her stay, no monitor issues, heart rate has been regular. She has no symptoms. Work-up results and potential for SVT versus atrial fibrillation based on the history given and high heart rate at home are all discussed with the patient and the family. Inpatient observation versus close outpatient follow-up and possible outpatient Holter monitor all discussed, they would like to go home at this time. [NC]      ED Course User Index  [NC] Ashly Client, DO          EKG Interpretation    Interpreted by emergency department physician    Rhythm: normal sinus   Rate: 67  Axis: normal  Ectopy: none  Conduction: normal  ST Segments: normal  T Waves: normal  Q Waves: none    Clinical Impression: no acute changes    [facility]     ED Course as of Jul 29 1537 Wed Jul 29, 2020   1532 Patient sitting the bed resting comfortably no distress. Has had unremarkable exam throughout her stay, no monitor issues, heart rate has been regular. She has no symptoms. Work-up results and potential for SVT versus atrial fibrillation based on the history given and high heart rate at home are all discussed with the patient and the family. Inpatient observation versus close outpatient follow-up and possible outpatient Holter monitor all discussed, they would like to go home at this time.     [NC]      ED Course User Index  [NC] Alessia Mccarthy, DO       --------------------------------------------- PAST HISTORY ---------------------------------------------  Past Medical History:  has a past medical history of Asthma, COPD (chronic obstructive pulmonary disease) ([facility] [address] Depression, Hiatal hernia, Hyperlipidemia, Hypertension, and PVC's (premature ventricular contractions). Past Surgical History:  has a past surgical history that includes Hysterectomy and Cataract removal with implant. Social History:  reports that she has never smoked. She has never used smokeless tobacco. She reports that she does not drink alcohol or use drugs. Family History: family history is not on file. The patients home medications have been reviewed.     Allergies: Demerol hcl [meperidine] and Statins    -------------------------------------------------- RESULTS -------------------------------------------------  Labs:  Results for orders placed or performed during the hospital encounter of 07/29/20   CBC Auto Differential   Result Value Ref Range    WBC 5.8 4.5 - 11.5 E9/L    RBC 4.65 3.50 - 5.50 E12/L    Hemoglobin 14.2 11.5 - 15.5 g/dL    Hematocrit 43.2 34.0 - 48.0 %    MCV 92.9 80.0 - 99.9 fL    MCH 30.5 26.0 - 35.0 pg    MCHC 32.9 32.0 - 34.5 %    RDW 13.1 11.5 - 15.0 fL    Platelets 681 612 - 094 E9/L    MPV 11.1 7.0 - 12.0 fL    Neutrophils % 64.4 43.0 - 80.0 %    Immature Granulocytes % 0.3 0.0 - 5.0 %    Lymphocytes % 24.3 20.0 - 42.0 %    Monocytes % 8.8 2.0 - 12.0 %    Eosinophils % 1.9 0.0 - 6.0 %    Basophils % 0.3 0.0 - 2.0 %    Neutrophils Absolute 3.74 1.80 - 7.30 E9/L    Immature Granulocytes # 0.02 E9/L    Lymphocytes Absolute 1.41 (L) 1.50 - 4.00 E9/L    Monocytes Absolute 0.51 0.10 - 0.95 E9/L    Eosinophils Absolute 0.11 0.05 - 0.50 E9/L    Basophils Absolute 0.02 0.00 - 0.20 E9/L   Comprehensive Metabolic Panel   Result Value Ref Range    Sodium 140 132 - 146 mmol/L    Potassium 4.0 3.5 - 5.0 mmol/L    Chloride 101 98 - 107 mmol/L    CO2 27 22 - 29 mmol/L    Anion Gap 12 7 - 16 mmol/L    Glucose 83 74 - 99 mg/dL    BUN 11 8 - 23 mg/dL    CREATININE 0.6 0.5 - 1.0 mg/dL    GFR Non-African American >60 >=60 mL/min/1.73    GFR African American >60     Calcium 9.5 8.6 - 10.2 mg/dL    Total Protein 6.5 6.4 - 8.3 g/dL Alb 4.2 3.5 - 5.2 g/dL    Total Bilirubin 0.3 0.0 - 1.2 mg/dL    Alkaline Phosphatase 62 35 - 104 U/L    ALT 12 0 - 32 U/L    AST 16 0 - 31 U/L   Brain Natriuretic Peptide   Result Value Ref Range    Pro- 0 - 450 pg/mL   Troponin   Result Value Ref Range    Troponin <0.01 0.00 - 0.03 ng/mL   Magnesium   Result Value Ref Range    Magnesium 2.2 1.6 - 2.6 mg/dL   Urinalysis   Result Value Ref Range    Color, UA Yellow Straw/Yellow    Clarity, UA Clear Clear    Glucose, Ur Negative Negative mg/dL    Bilirubin Urine Negative Negative    Ketones, Urine Negative Negative mg/dL    Specific Gravity, UA 1.010 1.005 - 1.030    Blood, Urine Negative Negative    pH, UA 7.5 5.0 - 9.0    Protein, UA Negative Negative mg/dL    Urobilinogen, Urine 0.2 <2.0 E.U./dL    Nitrite, Urine Negative Negative    Leukocyte Esterase, Urine SMALL (A) Negative   APTT   Result Value Ref Range    aPTT 31.1 24.5 - 35.1 sec   Protime-INR   Result Value Ref Range    Protime 12.1 9.3 - 12.4 sec    INR 1.1    Microscopic Urinalysis   Result Value Ref Range    WBC, UA 1-3 0 - 5 /HPF    RBC, UA 0-1 0 - 2 /HPF    Epithelial Cells, UA RARE /HPF    Bacteria, UA RARE (A) None Seen /HPF   EKG 12 Lead   Result Value Ref Range    Ventricular Rate 67 BPM    Atrial Rate 67 BPM    P-R Interval 126 ms    QRS Duration 92 ms    Q-T Interval 402 ms    QTc Calculation (Bazett) 424 ms    P Axis 54 degrees    R Axis 81 degrees    T Axis 56 degrees       Radiology:  XR CHEST PORTABLE   Final Result   COPD. There is no evidence of failure or pneumonia.             ------------------------- NURSING NOTES AND VITALS REVIEWED ---------------------------  Date / Time Roomed:  7/29/2020 12:32 PM  ED Bed Assignment:  09/09    The nursing notes within the ED encounter and vital signs as below have been reviewed.    /76   Pulse 61   Temp 97.2 °F (36.2 °C) (Infrared)   Resp 20   Ht 5' 5\" (1.651 m)   Wt 129 lb (58.5 kg)   SpO2 96%   BMI 21.47 kg/m²   Oxygen Saturation Interpretation: Normal      ------------------------------------------ PROGRESS NOTES ------------------------------------------  I have spoken with the patient and discussed todays results, in addition to providing specific details for the plan of care and counseling regarding the diagnosis and prognosis. Their questions are answered at this time and they are agreeable with the plan. I discussed at length with them reasons for immediate return here for re evaluation. They will followup with primary care by calling their office tomorrow. --------------------------------- ADDITIONAL PROVIDER NOTES ---------------------------------  At this time the patient is without objective evidence of an acute process requiring hospitalization or inpatient management. They have remained hemodynamically stable throughout their entire ED visit and are stable for discharge with outpatient follow-up. The plan has been discussed in detail and they are aware of the specific conditions for emergent return, as well as the importance of follow-up. New Prescriptions    No medications on file       Diagnosis:  1. Palpitations        Disposition:  Patient's disposition: Discharge to home  Patient's condition is stable.          [address] [facility], DO  07/29/20 1537

## 2020-07-29 NOTE — TELEPHONE ENCOUNTER
Nurse for patient phoned stating that patient had been having chest pressure this morning and when she got there patient is in afib and called an ambulance to transport to er. States that the squad is trying to talk patient out of going to the hospital because of covid and to contact her physician to see if patient can come in for ekg. Informed her that Dr is not at office until this afternoon and that patient needs to go to the er. Voiced understanding of this and heard her tell them that her doctor is not in the office and that patient needs to be transported.

## 2020-07-30 ENCOUNTER — TELEPHONE (OUTPATIENT)
Dept: FAMILY MEDICINE CLINIC | Age: 85
End: 2020-07-30

## 2020-07-30 NOTE — TELEPHONE ENCOUNTER
Patient's daughter, Berenice Murillo, called to schedule appt for ED follow up for Afib and stated patient patient was told may need heart monitor.   Appt made 8/3/20 at 5:00 pm.

## 2020-07-30 NOTE — TELEPHONE ENCOUNTER
Called and spoke to Rolando sandoval. Patient has been ok today. Mostly just tired. Would like to go back to [facility] for cardiology . Referral placed. Please help patient schedule.

## 2020-08-03 ENCOUNTER — OFFICE VISIT (OUTPATIENT)
Dept: FAMILY MEDICINE CLINIC | Age: 85
End: 2020-08-03
Payer: MEDICARE

## 2020-08-03 VITALS
HEART RATE: 59 BPM | SYSTOLIC BLOOD PRESSURE: 130 MMHG | WEIGHT: 128 LBS | HEIGHT: 65 IN | RESPIRATION RATE: 18 BRPM | BODY MASS INDEX: 21.33 KG/M2 | DIASTOLIC BLOOD PRESSURE: 84 MMHG | OXYGEN SATURATION: 96 %

## 2020-08-03 PROCEDURE — 1090F PRES/ABSN URINE INCON ASSESS: CPT | Performed by: FAMILY MEDICINE

## 2020-08-03 PROCEDURE — 1123F ACP DISCUSS/DSCN MKR DOCD: CPT | Performed by: FAMILY MEDICINE

## 2020-08-03 PROCEDURE — 99213 OFFICE O/P EST LOW 20 MIN: CPT | Performed by: FAMILY MEDICINE

## 2020-08-03 PROCEDURE — G8420 CALC BMI NORM PARAMETERS: HCPCS | Performed by: FAMILY MEDICINE

## 2020-08-03 PROCEDURE — 4040F PNEUMOC VAC/ADMIN/RCVD: CPT | Performed by: FAMILY MEDICINE

## 2020-08-03 PROCEDURE — 1036F TOBACCO NON-USER: CPT | Performed by: FAMILY MEDICINE

## 2020-08-03 PROCEDURE — G8427 DOCREV CUR MEDS BY ELIG CLIN: HCPCS | Performed by: FAMILY MEDICINE

## 2020-08-03 RX ORDER — MONTELUKAST SODIUM 10 MG/1
10 TABLET ORAL NIGHTLY
COMMUNITY
End: 2020-09-02 | Stop reason: SDUPTHER

## 2020-08-03 RX ORDER — OMEPRAZOLE 20 MG/1
20 CAPSULE, DELAYED RELEASE ORAL DAILY
COMMUNITY
End: 2021-07-19 | Stop reason: SDUPTHER

## 2020-08-03 RX ORDER — ASPIRIN 81 MG/1
81 TABLET ORAL DAILY
COMMUNITY
End: 2020-10-14 | Stop reason: CLARIF

## 2020-08-03 RX ORDER — AZELASTINE HYDROCHLORIDE, FLUTICASONE PROPIONATE 137; 50 UG/1; UG/1
SPRAY, METERED NASAL
COMMUNITY
End: 2021-07-26

## 2020-08-03 ASSESSMENT — ENCOUNTER SYMPTOMS
SHORTNESS OF BREATH: 0
COUGH: 0
WHEEZING: 0

## 2020-08-03 NOTE — PROGRESS NOTES
[address] [facility]  [phone]   Dino Guzman MD     Patient: Mile Leroy  YOB: 1929  Visit Date: 8/3/20    Juan Najera is a 80y.o. year old female here today for   Chief Complaint   Patient presents with    ED Follow-up       HPI  Patient is a 80year old female here for ER follow up. Was having chest pain and palpitations. Since the ER visit has not had any further palpitations. BP and pulse have been 171/93 hr 86 . Had an episode at the [address] [place] [address] of abnormal heart rate , years ago. Called and made an appointment for Nov 5. Medicare car [employer]. Hr was up to 179 at home prior to going to ER. Had not taken any new medicines. Balance is off. But this is not new. Has an occasional cup of coffee. Review of Systems   Constitutional: Negative for chills and fever. Respiratory: Negative for cough, shortness of breath and wheezing. Cardiovascular: Positive for leg swelling (improvement). Negative for chest pain and palpitations. Neurological: Positive for dizziness. Negative for light-headedness.        Current Outpatient Medications on File Prior to Visit   Medication Sig Dispense Refill    Azelastine-Fluticasone (DYMISTA) 137-50 MCG/ACT SUSP by Nasal route      omeprazole (PRILOSEC) 20 MG delayed release capsule Take 20 mg by mouth daily      aspirin 81 MG EC tablet Take 81 mg by mouth daily      montelukast (SINGULAIR) 10 MG tablet Take 10 mg by mouth nightly      meclizine (ANTIVERT) 12.5 MG tablet Take 12.5 mg by mouth 3 times daily as needed      donepezil (ARICEPT) 5 MG tablet Take 1 tablet by mouth nightly 90 tablet 0    alendronate (FOSAMAX) 70 MG tablet TAKE ONE TABLET BY MOUTH EVERY 7 DAYS 12 tablet 2    Umeclidinium Bromide (INCRUSE ELLIPTA) 62.5 MCG/INH AEPB Inhale 1 puff into the lungs daily 3 each 2    memantine (NAMENDA) 5 MG tablet TAKE ONE TABLET BY MOUTH TWO TIMES A  tablet 3    mirtazapine (REMERON) 30 MG tablet Take 1 tablet by mouth nightly 90 tablet 3    loratadine (CLARITIN) 5 MG chewable tablet Take 1 tablet by mouth daily 30 tablet 0    magnesium oxide (MAG-OX) 400 MG tablet Take 400 mg by mouth daily      Cyanocobalamin (VITAMIN B 12 PO) Take by mouth      vitamin D (CHOLECALCIFEROL) 1000 UNIT TABS tablet Take 1,000 Units by mouth 2 times daily       Biotin w/ Vitamins C & E (HAIR/SKIN/NAILS PO) Take by mouth      Multiple Vitamins-Minerals (MULTIVITAMIN ADULT PO) Take by mouth       No current facility-administered medications on file prior to visit. Allergies   Allergen Reactions    Demerol Hcl [Meperidine]      Other reaction(s): Intolerance  \"went into shok\"    Statins Other (See Comments)       Past medical, surgical, socialand/or family history reviewed, updated as needed, and are non-contributory (unless otherwise stated). Medications, allergies, and problem list also reviewed and updated as needed in patient's record. Wt Readings from Last 3 Encounters:   08/03/20 128 lb (58.1 kg)   07/29/20 129 lb (58.5 kg)   06/01/20 129 lb (58.5 kg)                   /84 (Site: Right Upper Arm, Position: Sitting)   Pulse 59   Resp 18   Ht 5' 5\" (1.651 m)   Wt 128 lb (58.1 kg)   SpO2 96%   BMI 21.30 kg/m²        Physical Exam  Vitals signs and nursing note reviewed. Constitutional:       Appearance: She is well-developed. Cardiovascular:      Rate and Rhythm: Normal rate and regular rhythm. Heart sounds: Normal heart sounds. No murmur. No friction rub. Pulmonary:      Effort: Pulmonary effort is normal. No respiratory distress. Breath sounds: Normal breath sounds. No stridor. No wheezing or rales. Abdominal:      General: Bowel sounds are normal. There is no distension. Palpations: Abdomen is soft. There is no mass. Tenderness: There is no abdominal tenderness. There is no guarding or rebound.    Musculoskeletal: Normal range of motion. Right lower leg: Edema present. Left lower leg: Edema present.    Skin:     Comments: No erythema        Results for orders placed or performed during the hospital encounter of 07/29/20   CBC Auto Differential   Result Value Ref Range    WBC 5.8 4.5 - 11.5 E9/L    RBC 4.65 3.50 - 5.50 E12/L    Hemoglobin 14.2 11.5 - 15.5 g/dL    Hematocrit 43.2 34.0 - 48.0 %    MCV 92.9 80.0 - 99.9 fL    MCH 30.5 26.0 - 35.0 pg    MCHC 32.9 32.0 - 34.5 %    RDW 13.1 11.5 - 15.0 fL    Platelets 718 048 - 146 E9/L    MPV 11.1 7.0 - 12.0 fL    Neutrophils % 64.4 43.0 - 80.0 %    Immature Granulocytes % 0.3 0.0 - 5.0 %    Lymphocytes % 24.3 20.0 - 42.0 %    Monocytes % 8.8 2.0 - 12.0 %    Eosinophils % 1.9 0.0 - 6.0 %    Basophils % 0.3 0.0 - 2.0 %    Neutrophils Absolute 3.74 1.80 - 7.30 E9/L    Immature Granulocytes # 0.02 E9/L    Lymphocytes Absolute 1.41 (L) 1.50 - 4.00 E9/L    Monocytes Absolute 0.51 0.10 - 0.95 E9/L    Eosinophils Absolute 0.11 0.05 - 0.50 E9/L    Basophils Absolute 0.02 0.00 - 0.20 E9/L   Comprehensive Metabolic Panel   Result Value Ref Range    Sodium 140 132 - 146 mmol/L    Potassium 4.0 3.5 - 5.0 mmol/L    Chloride 101 98 - 107 mmol/L    CO2 27 22 - 29 mmol/L    Anion Gap 12 7 - 16 mmol/L    Glucose 83 74 - 99 mg/dL    BUN 11 8 - 23 mg/dL    CREATININE 0.6 0.5 - 1.0 mg/dL    GFR Non-African American >60 >=60 mL/min/1.73    GFR African American >60     Calcium 9.5 8.6 - 10.2 mg/dL    Total Protein 6.5 6.4 - 8.3 g/dL    Alb 4.2 3.5 - 5.2 g/dL    Total Bilirubin 0.3 0.0 - 1.2 mg/dL    Alkaline Phosphatase 62 35 - 104 U/L    ALT 12 0 - 32 U/L    AST 16 0 - 31 U/L   Brain Natriuretic Peptide   Result Value Ref Range    Pro- 0 - 450 pg/mL   Troponin   Result Value Ref Range    Troponin <0.01 0.00 - 0.03 ng/mL   Magnesium   Result Value Ref Range    Magnesium 2.2 1.6 - 2.6 mg/dL   Urinalysis   Result Value Ref Range    Color, UA Yellow Straw/Yellow    Clarity, UA Clear Clear    Glucose, Ur

## 2020-08-25 ENCOUNTER — TELEPHONE (OUTPATIENT)
Dept: FAMILY MEDICINE CLINIC | Age: 85
End: 2020-08-25

## 2020-08-26 RX ORDER — MIRTAZAPINE 30 MG/1
30 TABLET, FILM COATED ORAL NIGHTLY
Qty: 90 TABLET | Refills: 3 | Status: SHIPPED
Start: 2020-08-26 | End: 2020-10-14 | Stop reason: SDUPTHER

## 2020-08-26 RX ORDER — ALENDRONATE SODIUM 70 MG/1
70 TABLET ORAL
Qty: 12 TABLET | Refills: 2 | Status: SHIPPED
Start: 2020-08-26 | End: 2020-09-08 | Stop reason: SDUPTHER

## 2020-08-26 RX ORDER — DONEPEZIL HYDROCHLORIDE 5 MG/1
5 TABLET, FILM COATED ORAL NIGHTLY
Qty: 90 TABLET | Refills: 1 | Status: SHIPPED
Start: 2020-08-26 | End: 2020-09-08 | Stop reason: SDUPTHER

## 2020-08-26 RX ORDER — MEMANTINE HYDROCHLORIDE 5 MG/1
TABLET ORAL
Qty: 180 TABLET | Refills: 3 | Status: SHIPPED
Start: 2020-08-26 | End: 2020-10-14 | Stop reason: SDUPTHER

## 2020-08-28 ENCOUNTER — HOSPITAL ENCOUNTER (OUTPATIENT)
Age: 85
Discharge: HOME | End: 2020-08-30
Payer: MEDICARE

## 2020-08-28 ENCOUNTER — OFFICE VISIT (OUTPATIENT)
Dept: CARDIOLOGY CLINIC | Age: 85
End: 2020-08-28
Payer: MEDICARE

## 2020-08-28 ENCOUNTER — NURSE ONLY (OUTPATIENT)
Dept: CARDIOLOGY CLINIC | Age: 85
End: 2020-08-28

## 2020-08-28 VITALS
WEIGHT: 128 LBS | HEIGHT: 65 IN | BODY MASS INDEX: 21.33 KG/M2 | OXYGEN SATURATION: 98 % | SYSTOLIC BLOOD PRESSURE: 110 MMHG | RESPIRATION RATE: 18 BRPM | HEART RATE: 58 BPM | DIASTOLIC BLOOD PRESSURE: 66 MMHG

## 2020-08-28 LAB — TSH SERPL DL<=0.05 MIU/L-ACNC: 2.4 UIU/ML (ref 0.27–4.2)

## 2020-08-28 PROCEDURE — 4040F PNEUMOC VAC/ADMIN/RCVD: CPT | Performed by: INTERNAL MEDICINE

## 2020-08-28 PROCEDURE — 84443 ASSAY THYROID STIM HORMONE: CPT

## 2020-08-28 PROCEDURE — 93000 ELECTROCARDIOGRAM COMPLETE: CPT | Performed by: INTERNAL MEDICINE

## 2020-08-28 PROCEDURE — 1090F PRES/ABSN URINE INCON ASSESS: CPT | Performed by: INTERNAL MEDICINE

## 2020-08-28 PROCEDURE — 1123F ACP DISCUSS/DSCN MKR DOCD: CPT | Performed by: INTERNAL MEDICINE

## 2020-08-28 PROCEDURE — 36415 COLL VENOUS BLD VENIPUNCTURE: CPT

## 2020-08-28 PROCEDURE — 99205 OFFICE O/P NEW HI 60 MIN: CPT | Performed by: INTERNAL MEDICINE

## 2020-08-28 PROCEDURE — 1036F TOBACCO NON-USER: CPT | Performed by: INTERNAL MEDICINE

## 2020-08-28 PROCEDURE — G8420 CALC BMI NORM PARAMETERS: HCPCS | Performed by: INTERNAL MEDICINE

## 2020-08-28 PROCEDURE — G8427 DOCREV CUR MEDS BY ELIG CLIN: HCPCS | Performed by: INTERNAL MEDICINE

## 2020-08-28 NOTE — PROGRESS NOTES
Chief Complaint   Patient presents with    Irregular Heart Beat       Patient Active Problem List    Diagnosis Date Noted    Hyperlipidemia     Memory loss 01/19/2017     Overview Note:     Overview:   MOCA (1/2017) 25  MMSE (1/2017) 27    Last Assessment & Plan:   Suspect that complaints are mostly related to depression, though cannot completely exclude possibility of an early dementia syndrome  - will address depression  - monitor functional status  - check B12 and CT of the brain  - check MOCA again in 6 months  - no indication for cognitive enhancers at this time      Moderate single current episode of major depressive disorder ([facility] [address] 01/19/2017     Overview Note:     Last Assessment & Plan:   Significant depression symptoms: feels depressed, low energy, poor apettite, poor sleep, problems with concentration, poor motivation, loss of interests  - increased sertraline to 100 mg  - monitor for tolerability and efficacy  - can consider mirtazapine, venlafaxine in the future if response inadequate  - significant caregiver stress noted - advised her to block off time for her own physical and mental wellbeing  - advised her to start taking a daily walking  - discussed role of psychotherapy in her case - would benefit from counseling, coping strategies; will consider in the future      Ventricular ectopy 10/31/2016     Overview Note:     A. [facility] workup 2016 with minimal CAD on cath and normal echo. Holter isolated ectopy.    Flecainide trial resulted in bradycardia      Posterior vitreous detachment 02/11/2015    Senile nuclear sclerosis 02/11/2015       Current Outpatient Medications   Medication Sig Dispense Refill    memantine (NAMENDA) 5 MG tablet TAKE ONE TABLET BY MOUTH TWO TIMES A  tablet 3    mirtazapine (REMERON) 30 MG tablet Take 1 tablet by mouth nightly 90 tablet 3    alendronate (FOSAMAX) 70 MG tablet Take 1 tablet by mouth every 7 days 12 tablet 2    donepezil (ARICEPT) 5 MG tablet Take 1 tablet by mouth nightly 90 tablet 1    Azelastine-Fluticasone (DYMISTA) 137-50 MCG/ACT SUSP by Nasal route      omeprazole (PRILOSEC) 20 MG delayed release capsule Take 20 mg by mouth daily      aspirin 81 MG EC tablet Take 81 mg by mouth daily      montelukast (SINGULAIR) 10 MG tablet Take 10 mg by mouth nightly      meclizine (ANTIVERT) 12.5 MG tablet Take 12.5 mg by mouth 3 times daily as needed      Umeclidinium Bromide (INCRUSE ELLIPTA) 62.5 MCG/INH AEPB Inhale 1 puff into the lungs daily 3 each 2    loratadine (CLARITIN) 5 MG chewable tablet Take 1 tablet by mouth daily 30 tablet 0    magnesium oxide (MAG-OX) 400 MG tablet Take 400 mg by mouth daily      Cyanocobalamin (VITAMIN B 12 PO) Take by mouth      vitamin D (CHOLECALCIFEROL) 1000 UNIT TABS tablet Take 1,000 Units by mouth 2 times daily       Biotin w/ Vitamins C & E (HAIR/SKIN/NAILS PO) Take by mouth      Multiple Vitamins-Minerals (MULTIVITAMIN ADULT PO) Take by mouth       No current facility-administered medications for this visit. Allergies   Allergen Reactions    Demerol Hcl [Meperidine]      Other reaction(s): Intolerance  \"went into shok\"    Statins Other (See Comments)       Vitals:    08/28/20 0837   BP: 110/66   Site: Right Upper Arm   Position: Sitting   Cuff Size: Medium Adult   Pulse: 58   Resp: 18   SpO2: 98%   Weight: 128 lb (58.1 kg)   Height: 5' 5\" (1.651 m)       Social History     Socioeconomic History    Marital status:       Spouse name: Not on file    Number of children: Not on file    Years of education: Not on file    Highest education level: Not on file   Occupational History    Not on file   Social Needs    Financial resource strain: Not on file    Food insecurity     Worry: Not on file     Inability: Not on file    Transportation needs     Medical: Not on file     Non-medical: Not on file   Tobacco Use    Smoking status: Never Smoker    Smokeless tobacco: Never Used   Substance and Sexual Activity    Alcohol use: No     Comment: 1 cup of coffee a day     Drug use: No    Sexual activity: Not on file   Lifestyle    Physical activity     Days per week: Not on file     Minutes per session: Not on file    Stress: Not on file   Relationships    Social connections     Talks on phone: Not on file     Gets together: Not on file     Attends Latter-day service: Not on file     Active member of club or organization: Not on file     Attends meetings of clubs or organizations: Not on file     Relationship status: Not on file    Intimate partner violence     Fear of current or ex partner: Not on file     Emotionally abused: Not on file     Physically abused: Not on file     Forced sexual activity: Not on file   Other Topics Concern    Not on file   Social History Narrative    Not on file       History reviewed. No pertinent family history. SUBJECTIVE: Vidya Turk presents to the office today for consutl - dr. Pascual Hightower - for palpitations. Hx of PVC evaluation at [facility] - I reviewed records. Has not had an issue until 7/29 (ED visit - records reviewed), for palpiations, chest pain and claim of HR in 140s at home. Nothing in ER. She complains of fatigue and no more palpitations since ED visit and denies   chest pain, chest pressure/discomfort, claudication, exertional chest pressure/discomfort, lower extremity edema, near-syncope, orthopnea, paroxysmal nocturnal dyspnea, syncope and tachypnea. Lives with grandson, does inside light chores only. On Namenda/Aricept combo. Review of Systems:   Heart: as above   Lungs: as above   Eyes: denies changes in vision or discharge. Ears: denies changes in hearing or pain. Nose: denies epistaxis or masses   Throat: denies sore throat or trouble swallowing. Neuro: denies numbness, tingling, tremors. Skin: denies rashes or itching. : denies hematuria, dysuria   GI: denies vomiting, diarrhea   Psych: denies mood changed, anxiety, depression.   all others negative. Physical Exam   /66 (Site: Right Upper Arm, Position: Sitting, Cuff Size: Medium Adult)   Pulse 58   Resp 18   Ht 5' 5\" (1.651 m)   Wt 128 lb (58.1 kg)   SpO2 98%   BMI 21.30 kg/m²   Constitutional: Oriented to person, place, and time. Well-developed and well-nourished. No distress. Head: Normocephalic and atraumatic. Eyes: EOM are normal. Pupils are equal, round, and reactive to light. Neck: Normal range of motion. Neck supple. No hepatojugular reflux and no JVD present. Carotid bruit is not present. No tracheal deviation present. No thyromegaly present. Cardiovascular: Normal rate, regular rhythm, normal heart sounds and intact distal pulses. Exam reveals no gallop and no friction rub. No murmur heard. Pulmonary/Chest: Effort normal and breath sounds normal. No respiratory distress. No wheezes. No rales. No tenderness. Abdominal: Soft. Bowel sounds are normal. No distension and no mass. No tenderness. No rebound and no guarding. Musculoskeletal: Normal range of motion. No edema and no tenderness. Neurological: Alert and oriented to person, place, and time. Skin: Skin is warm and dry. No rash noted. Not diaphoretic. No erythema. Psychiatric: Normal mood and affect. Behavior is normal.     EKG:  nonspecific ST and T waves changes, sinus bradycardia, there are no previous tracings available for comparison.     ASSESSMENT AND PLAN:  Patient Active Problem List   Diagnosis    Memory loss    Moderate single current episode of major depressive disorder ([address]    Posterior vitreous detachment    Senile nuclear sclerosis    Ventricular ectopy    Hyperlipidemia     Patient with hx of benign PVCs in past with no evidence of structual or ischemic heart disease by [facility] evaluation [id number]  Exam and ekg benign today  Must rule out, if possible, PAF as cause of her recent symptoms - [employer] 30 day monitor placed  Discussed afib ramifications with patient and daughter - CVA risk, anticoagulation etc  N.B. severe magdi with AAD in past (flecainide)  No other testing needed now    Viktor Benitez M.D  [facility]

## 2020-08-28 NOTE — PROGRESS NOTES
Patient was seen by Dr. Finn Oliveira today and a 30 day event monitor was ordered. Instructions were given to patient and daughter who voiced understanding. Monitor was successfully applied.

## 2020-08-31 ENCOUNTER — TELEPHONE (OUTPATIENT)
Dept: CARDIOLOGY CLINIC | Age: 85
End: 2020-08-31

## 2020-08-31 NOTE — TELEPHONE ENCOUNTER
EP is following the 30 day monitor and will alert us if there is any issue  BP was fine when I saw her.   Feel free to follow and institute therapy if needed

## 2020-08-31 NOTE — TELEPHONE ENCOUNTER
Patient was seen recently in the office. Her eyes are bloodshot and pressure was running 177/94 today it's down to 152/86    She is currently not on any blood pressure medication - they weren't sure if they needed to worry about anything    Also, I scanned an event from her monitor in. Wasn't sure if anything was concerning at this time to pass off to EP?

## 2020-09-01 NOTE — TELEPHONE ENCOUNTER
MD Mellissa Alex               Monitor showed 11 beats of wide complex tachycardia possible NSVT. Thanks. Previous Messages     ----- Message -----   From: Pamela Orellana   Sent: 8/31/2020   1:08 PM EDT   To: MD Dr. Alise Alex pt, patient was symptomatic at the time of event.  Ideal heart racing and her BP was high

## 2020-09-02 ENCOUNTER — OFFICE VISIT (OUTPATIENT)
Dept: FAMILY MEDICINE CLINIC | Age: 85
End: 2020-09-02
Payer: MEDICARE

## 2020-09-02 VITALS
BODY MASS INDEX: 21.49 KG/M2 | SYSTOLIC BLOOD PRESSURE: 110 MMHG | HEIGHT: 65 IN | OXYGEN SATURATION: 97 % | RESPIRATION RATE: 18 BRPM | WEIGHT: 129 LBS | DIASTOLIC BLOOD PRESSURE: 76 MMHG | HEART RATE: 59 BPM | TEMPERATURE: 98 F

## 2020-09-02 PROCEDURE — 99213 OFFICE O/P EST LOW 20 MIN: CPT | Performed by: FAMILY MEDICINE

## 2020-09-02 PROCEDURE — 1090F PRES/ABSN URINE INCON ASSESS: CPT | Performed by: FAMILY MEDICINE

## 2020-09-02 PROCEDURE — 4040F PNEUMOC VAC/ADMIN/RCVD: CPT | Performed by: FAMILY MEDICINE

## 2020-09-02 PROCEDURE — G8420 CALC BMI NORM PARAMETERS: HCPCS | Performed by: FAMILY MEDICINE

## 2020-09-02 PROCEDURE — 1123F ACP DISCUSS/DSCN MKR DOCD: CPT | Performed by: FAMILY MEDICINE

## 2020-09-02 PROCEDURE — 1036F TOBACCO NON-USER: CPT | Performed by: FAMILY MEDICINE

## 2020-09-02 PROCEDURE — G8427 DOCREV CUR MEDS BY ELIG CLIN: HCPCS | Performed by: FAMILY MEDICINE

## 2020-09-02 RX ORDER — MONTELUKAST SODIUM 10 MG/1
10 TABLET ORAL NIGHTLY
Qty: 30 TABLET | Refills: 3 | Status: SHIPPED
Start: 2020-09-02 | End: 2021-03-17 | Stop reason: CLARIF

## 2020-09-02 ASSESSMENT — ENCOUNTER SYMPTOMS
SHORTNESS OF BREATH: 0
COUGH: 0
WHEEZING: 0

## 2020-09-02 NOTE — PROGRESS NOTES
[address] [facility]  [phone]   Dino Guzman MD     Patient: Mile Leroy  YOB: 1929  Visit Date: 9/2/20    Juan Najera is a 80y.o. year old female here today for   Chief Complaint   Patient presents with    Follow-up     patient is here for one month follow up . She was having palpation and hasn't had any sense. She is currently wearing a heart monitor    Health Maintenance     due for pnuem and shingles        HPI  Patient is a 80year old female here for follow up of tachycardia. Has had a couple more episodes. Felt like her heart was raciing very brief. Gets tired more often. Needs handicap tag. No chest pain or sob unless exerting herself. On Friday started having blood shot eye , bp was garrett at the time. Worsening by the weekend. Called on Monday . Put eye drops in her eyes. Redness has dissapeared but now a little yellow. No chest pain ligtheadednes. s eating ok. No N/V.     Grandson lives in the basement since May , but does not really see her. .   Review of Systems   Constitutional: Negative for chills and fever. Respiratory: Negative for cough, shortness of breath and wheezing. Cardiovascular: Positive for leg swelling (improvement). Negative for chest pain and palpitations. Neurological: Positive for dizziness. Negative for light-headedness.        Current Outpatient Medications on File Prior to Visit   Medication Sig Dispense Refill    memantine (NAMENDA) 5 MG tablet TAKE ONE TABLET BY MOUTH TWO TIMES A  tablet 3    mirtazapine (REMERON) 30 MG tablet Take 1 tablet by mouth nightly 90 tablet 3    Azelastine-Fluticasone (DYMISTA) 137-50 MCG/ACT SUSP by Nasal route      omeprazole (PRILOSEC) 20 MG delayed release capsule Take 20 mg by mouth daily      aspirin 81 MG EC tablet Take 81 mg by mouth daily      meclizine (ANTIVERT) 12.5 MG tablet Take 12.5 mg by mouth 3 times daily as needed      magnesium oxide Right lower leg: Edema present. Left lower leg: Edema present. Results for orders placed or performed during the hospital encounter of 08/28/20   TSH   Result Value Ref Range    TSH 2.400 0.270 - 4.200 uIU/mL       ASSESSMENT/PLAN  Emilee was seen today for follow-up and health maintenance. Diagnoses and all orders for this visit:    Heart palpitations   - Seeing EP    Has heart monitor in place. Moderate single current episode of major depressive disorder (HCC)   - Stable . On remeron. Scleral icterus   - Most likely from burst blood vessel that is now resolving. bp currently controlled. Other orders  -     montelukast (SINGULAIR) 10 MG tablet; Take 1 tablet by mouth nightly            Phone/MyChart follow up if tests abnormal.    Return in about 1 month (around 10/2/2020) for heart palpitations. . or sooner if necessary. I have reviewed myfindings and recommendations with Emilee. Keyona Cassidy.  Sandhya Mcguire M.D

## 2020-09-03 ENCOUNTER — TELEPHONE (OUTPATIENT)
Dept: CARDIOLOGY CLINIC | Age: 85
End: 2020-09-03

## 2020-09-03 RX ORDER — METOPROLOL SUCCINATE 25 MG/1
25 TABLET, EXTENDED RELEASE ORAL DAILY
Qty: 30 TABLET | Refills: 5 | Status: SHIPPED
Start: 2020-09-03 | End: 2021-02-12

## 2020-09-03 NOTE — TELEPHONE ENCOUNTER
Patients daughter notified and instructed on monitor results and medications.   She stated understanding and she will call the [facility] [place] office to schedule 1 month follow-up appt

## 2020-09-03 NOTE — TELEPHONE ENCOUNTER
----- Message from Gal Khan MD sent at 9/3/2020 12:34 PM EDT -----  Let her daughter know her montor does show some fast heart beats like [facility] saw before  NO atrial fibrillation  Start toprol xl 25 q am  OV one months - I forget what office I saw her at

## 2020-09-03 NOTE — TELEPHONE ENCOUNTER
I CALLED AND SPOKE AND FAXED  TO LORE RE:  [facility] [address] [place] [address] [address] [facility]. [facility].     GEOVANNY

## 2020-09-11 RX ORDER — ALENDRONATE SODIUM 70 MG/1
70 TABLET ORAL
Qty: 12 TABLET | Refills: 2 | Status: SHIPPED
Start: 2020-09-11 | End: 2020-10-14 | Stop reason: SDUPTHER

## 2020-09-11 RX ORDER — DONEPEZIL HYDROCHLORIDE 5 MG/1
5 TABLET, FILM COATED ORAL NIGHTLY
Qty: 90 TABLET | Refills: 1 | Status: SHIPPED
Start: 2020-09-11 | End: 2020-10-14 | Stop reason: CLARIF

## 2020-09-11 RX ORDER — UMECLIDINIUM 62.5 UG/1
1 AEROSOL, POWDER ORAL DAILY
Qty: 3 EACH | Refills: 2 | Status: SHIPPED
Start: 2020-09-11 | End: 2020-10-14 | Stop reason: SDUPTHER

## 2020-09-21 ENCOUNTER — TELEPHONE (OUTPATIENT)
Dept: FAMILY MEDICINE CLINIC | Age: 85
End: 2020-09-21

## 2020-09-21 NOTE — TELEPHONE ENCOUNTER
Patient's daughter, Murphy Kelly, called to inform that insurance is requesting a Prior Auth for [employer]. Murphy Kelly stated patient used to use [employer], but it bothered her.         Last seen 9/2/2020  Next appt 10/14/2020  [facility]

## 2020-09-28 ENCOUNTER — TELEPHONE (OUTPATIENT)
Dept: PHYSICAL MEDICINE AND REHAB | Age: 85
End: 2020-09-28

## 2020-09-28 NOTE — TELEPHONE ENCOUNTER
Pt's daughter called to schedule injections for her mother,rt hip and rt shoulder. Last injections were 06-01-20.   Please call Meghan Manzanares [phone]

## 2020-10-02 ENCOUNTER — OFFICE VISIT (OUTPATIENT)
Dept: CARDIOLOGY CLINIC | Age: 85
End: 2020-10-02
Payer: MEDICARE

## 2020-10-02 VITALS
DIASTOLIC BLOOD PRESSURE: 60 MMHG | SYSTOLIC BLOOD PRESSURE: 118 MMHG | BODY MASS INDEX: 21.66 KG/M2 | HEART RATE: 50 BPM | OXYGEN SATURATION: 91 % | HEIGHT: 65 IN | RESPIRATION RATE: 18 BRPM | WEIGHT: 130 LBS

## 2020-10-02 PROCEDURE — 1090F PRES/ABSN URINE INCON ASSESS: CPT | Performed by: INTERNAL MEDICINE

## 2020-10-02 PROCEDURE — 4040F PNEUMOC VAC/ADMIN/RCVD: CPT | Performed by: INTERNAL MEDICINE

## 2020-10-02 PROCEDURE — 1036F TOBACCO NON-USER: CPT | Performed by: INTERNAL MEDICINE

## 2020-10-02 PROCEDURE — G8420 CALC BMI NORM PARAMETERS: HCPCS | Performed by: INTERNAL MEDICINE

## 2020-10-02 PROCEDURE — 99213 OFFICE O/P EST LOW 20 MIN: CPT | Performed by: INTERNAL MEDICINE

## 2020-10-02 PROCEDURE — 1123F ACP DISCUSS/DSCN MKR DOCD: CPT | Performed by: INTERNAL MEDICINE

## 2020-10-02 PROCEDURE — G8484 FLU IMMUNIZE NO ADMIN: HCPCS | Performed by: INTERNAL MEDICINE

## 2020-10-02 PROCEDURE — G8427 DOCREV CUR MEDS BY ELIG CLIN: HCPCS | Performed by: INTERNAL MEDICINE

## 2020-10-02 RX ORDER — MELOXICAM 7.5 MG/1
7.5 TABLET ORAL DAILY
COMMUNITY
End: 2020-11-25 | Stop reason: CLARIF

## 2020-10-02 NOTE — PROGRESS NOTES
Chief Complaint   Patient presents with    Irregular Heart Beat       Patient Active Problem List    Diagnosis Date Noted    Hypertension     [facility]raventricular tachycardia) ([facility]     Hyperlipidemia     Memory loss 01/19/2017     Overview Note:     Overview:   MOCA (1/2017) 25  MMSE (1/2017) 27    Last Assessment & Plan:   Suspect that complaints are mostly related to depression, though cannot completely exclude possibility of an early dementia syndrome  - will address depression  - monitor functional status  - check B12 and CT of the brain  - check MOCA again in 6 months  - no indication for cognitive enhancers at this time      Moderate single current episode of major depressive disorder ([facility] 01/19/2017     Overview Note:     Last Assessment & Plan:   Significant depression symptoms: feels depressed, low energy, poor apettite, poor sleep, problems with concentration, poor motivation, loss of interests  - increased sertraline to 100 mg  - monitor for tolerability and efficacy  - can consider mirtazapine, venlafaxine in the future if response inadequate  - significant caregiver stress noted - advised her to block off time for her own physical and mental wellbeing  - advised her to start taking a daily walking  - discussed role of psychotherapy in her case - would benefit from counseling, coping strategies; will consider in the future      Ventricular ectopy 10/31/2016     Overview Note:     A. [facility] workup 2016 with minimal CAD on cath and normal echo. Holter isolated ectopy. Flecainide trial resulted in bradycardia  B.  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) for 24 seconds      Posterior vitreous detachment 02/11/2015    Senile nuclear sclerosis 02/11/2015       Current Outpatient Medications   Medication Sig Dispense Refill    Umeclidinium Bromide 62.5 MCG/INH AEPB Inhale 1 puff into the lungs daily      meloxicam (MOBIC) 7.5 MG tablet Take 7.5 mg by mouth daily      alendronate (FOSAMAX) 70 MG tablet Take 1 tablet by mouth every 7 days 12 tablet 2    Umeclidinium Bromide (INCRUSE ELLIPTA) 62.5 MCG/INH AEPB Inhale 1 puff into the lungs daily 3 each 2    donepezil (ARICEPT) 5 MG tablet Take 1 tablet by mouth nightly 90 tablet 1    metoprolol succinate (TOPROL XL) 25 MG extended release tablet Take 1 tablet by mouth daily 30 tablet 5    montelukast (SINGULAIR) 10 MG tablet Take 1 tablet by mouth nightly 30 tablet 3    memantine (NAMENDA) 5 MG tablet TAKE ONE TABLET BY MOUTH TWO TIMES A  tablet 3    mirtazapine (REMERON) 30 MG tablet Take 1 tablet by mouth nightly 90 tablet 3    Azelastine-Fluticasone (DYMISTA) 137-50 MCG/ACT SUSP by Nasal route      omeprazole (PRILOSEC) 20 MG delayed release capsule Take 20 mg by mouth daily      aspirin 81 MG EC tablet Take 81 mg by mouth daily      meclizine (ANTIVERT) 12.5 MG tablet Take 12.5 mg by mouth 3 times daily as needed      loratadine (CLARITIN) 5 MG chewable tablet Take 1 tablet by mouth daily 30 tablet 0    magnesium oxide (MAG-OX) 400 MG tablet Take 400 mg by mouth daily      Cyanocobalamin (VITAMIN B 12 PO) Take by mouth      vitamin D (CHOLECALCIFEROL) 1000 UNIT TABS tablet Take 1,000 Units by mouth 2 times daily       Biotin w/ Vitamins C & E (HAIR/SKIN/NAILS PO) Take by mouth      Multiple Vitamins-Minerals (MULTIVITAMIN ADULT PO) Take by mouth       No current facility-administered medications for this visit. Allergies   Allergen Reactions    Demerol Hcl [Meperidine]      Other reaction(s): Intolerance  \"went into shok\"    Statins Other (See Comments)       Vitals:    10/02/20 1130   BP: 118/60   Site: Right Upper Arm   Position: Sitting   Cuff Size: Medium Adult   Pulse: 50   Resp: 18   SpO2: 91%   Weight: 130 lb (59 kg)   Height: 5' 5\" (1.651 m)       Social History     Socioeconomic History    Marital status:       Spouse name: Not on file    Number of children: Not inside light chores only. On Namenda/Aricept combo. Review of Systems:   Heart: as above   Lungs: as above   Eyes: denies changes in vision or discharge. Ears: denies changes in hearing or pain. Nose: denies epistaxis or masses   Throat: denies sore throat or trouble swallowing. Neuro: denies numbness, tingling, tremors. Skin: denies rashes or itching. : denies hematuria, dysuria   GI: denies vomiting, diarrhea   Psych: denies mood changed, anxiety, depression. all others negative. Physical Exam   /60 (Site: Right Upper Arm, Position: Sitting, Cuff Size: Medium Adult)   Pulse 50   Resp 18   Ht 5' 5\" (1.651 m)   Wt 130 lb (59 kg)   SpO2 91%   BMI 21.63 kg/m²   Constitutional: appropriate. Slow of speech. Well-developed and well-nourished. No distress. Head: Normocephalic and atraumatic. Eyes: EOM are normal. Pupils are equal, round, and reactive to light. Neck: Normal range of motion. Neck supple. No hepatojugular reflux and no JVD present. Carotid bruit is not present. No tracheal deviation present. No thyromegaly present. Cardiovascular: Normal rate, regular rhythm, normal heart sounds and intact distal pulses. Exam reveals no gallop and no friction rub. No murmur heard. Pulmonary/Chest: Effort normal and breath sounds normal. No respiratory distress. No wheezes. No rales. No tenderness. Abdominal: Soft. Bowel sounds are normal. No distension and no mass. No tenderness. No rebound and no guarding. Musculoskeletal: Normal range of motion. No edema and no tenderness. Neurological: Alert and oriented to person, place, and time. Skin: Skin is warm and dry. No rash noted. Not diaphoretic. No erythema. Psychiatric: Normal mood and affect.  Behavior is normal.         ASSESSMENT AND PLAN:  Patient Active Problem List   Diagnosis    Memory loss    Moderate single current episode of major depressive disorder         SVT    Ventricular ectopy    Hyperlipidemia Patient with hx of PVCs in past with no evidence of structual or ischemic heart disease by [facility] evaluation [id number]  Monitor captures the PVCs again, and short NSVT. 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   NO atrial fibrillation  N.B. severe magdi with AAD in past (flecainide), and low dose beta blocker has taken her HR down to 50 bpm.   We will hold her Aricept to lessen bradycardia effects and she will be evaluated for mental status with upcoming PCP  Visit to see if the medication can be discontinued  No other testing needed now    OV 3 months    Genaro Lr M.D  [id number] [facility]

## 2020-10-07 NOTE — TELEPHONE ENCOUNTER
Called and spoke with the patient daughter to let her know that she is already scheduled on 10- for a right hip injection and that the physician can not do 2 different injections on the same day. Patient daughter was informed that she can not scheduled injection through her mychart. Patient daughter is aware and voiced understanding.

## 2020-10-14 ENCOUNTER — OFFICE VISIT (OUTPATIENT)
Dept: FAMILY MEDICINE CLINIC | Age: 85
End: 2020-10-14
Payer: MEDICARE

## 2020-10-14 VITALS
OXYGEN SATURATION: 96 % | HEART RATE: 54 BPM | BODY MASS INDEX: 21.69 KG/M2 | TEMPERATURE: 96.6 F | HEIGHT: 65 IN | RESPIRATION RATE: 16 BRPM | SYSTOLIC BLOOD PRESSURE: 122 MMHG | WEIGHT: 130.2 LBS | DIASTOLIC BLOOD PRESSURE: 78 MMHG

## 2020-10-14 PROCEDURE — G8926 SPIRO NO PERF OR DOC: HCPCS | Performed by: FAMILY MEDICINE

## 2020-10-14 PROCEDURE — 1090F PRES/ABSN URINE INCON ASSESS: CPT | Performed by: FAMILY MEDICINE

## 2020-10-14 PROCEDURE — G8420 CALC BMI NORM PARAMETERS: HCPCS | Performed by: FAMILY MEDICINE

## 2020-10-14 PROCEDURE — 1123F ACP DISCUSS/DSCN MKR DOCD: CPT | Performed by: FAMILY MEDICINE

## 2020-10-14 PROCEDURE — 4040F PNEUMOC VAC/ADMIN/RCVD: CPT | Performed by: FAMILY MEDICINE

## 2020-10-14 PROCEDURE — G8484 FLU IMMUNIZE NO ADMIN: HCPCS | Performed by: FAMILY MEDICINE

## 2020-10-14 PROCEDURE — 1036F TOBACCO NON-USER: CPT | Performed by: FAMILY MEDICINE

## 2020-10-14 PROCEDURE — 99213 OFFICE O/P EST LOW 20 MIN: CPT | Performed by: FAMILY MEDICINE

## 2020-10-14 PROCEDURE — 3023F SPIROM DOC REV: CPT | Performed by: FAMILY MEDICINE

## 2020-10-14 PROCEDURE — G8427 DOCREV CUR MEDS BY ELIG CLIN: HCPCS | Performed by: FAMILY MEDICINE

## 2020-10-14 RX ORDER — ALENDRONATE SODIUM 70 MG/1
70 TABLET ORAL
Qty: 12 TABLET | Refills: 2 | Status: SHIPPED
Start: 2020-10-14 | End: 2021-02-03 | Stop reason: SDUPTHER

## 2020-10-14 RX ORDER — MIRTAZAPINE 30 MG/1
30 TABLET, FILM COATED ORAL NIGHTLY
Qty: 90 TABLET | Refills: 3 | Status: SHIPPED
Start: 2020-10-14 | End: 2020-11-25 | Stop reason: CLARIF

## 2020-10-14 RX ORDER — FLUOXETINE 10 MG/1
10 CAPSULE ORAL DAILY
Qty: 30 CAPSULE | Refills: 3 | Status: SHIPPED
Start: 2020-10-14 | End: 2020-11-25 | Stop reason: SDUPTHER

## 2020-10-14 RX ORDER — MEMANTINE HYDROCHLORIDE 5 MG/1
TABLET ORAL
Qty: 180 TABLET | Refills: 3 | Status: SHIPPED
Start: 2020-10-14 | End: 2021-02-03 | Stop reason: SDUPTHER

## 2020-10-14 RX ORDER — UMECLIDINIUM 62.5 UG/1
1 AEROSOL, POWDER ORAL DAILY
Qty: 3 EACH | Refills: 2 | Status: SHIPPED
Start: 2020-10-14 | End: 2021-10-18 | Stop reason: SDUPTHER

## 2020-10-14 ASSESSMENT — ENCOUNTER SYMPTOMS
COUGH: 0
SHORTNESS OF BREATH: 0
WHEEZING: 0

## 2020-10-14 NOTE — PATIENT INSTRUCTIONS
Try vanicream for your skin   Cut the remoron in half for a week then you can try it every other day for a week then stop   Start the prozac 10mg in the morning

## 2020-10-14 NOTE — PROGRESS NOTES
[address] [facility]  [phone]   Meghna Lyons MD     Patient: [place]  YOB: 1929  Visit Date: 10/14/20    Lilo Pierre is a 80y.o. year old female here today for   Chief Complaint   Patient presents with    Follow-up     Heart palpatations     Medication Refill       HPI  Patient is a 80year old female here for follow up of heart palpitations. Saw Dr. Nella Novak - was started on metoprolol   Was told to stop aricept due to concern for bradycardia. Has not noticed a difference since stopping the aricept. Has been having dysequilibrium . Gets a little vertigo every once in a while when lying in bed. Takes melatonin 10mg at bedtime. Denies significant anxiety /depression personally but daughter thinks she has been depressed. Does not have as much to do. Feels like she does not have a purpose anymore . Review of Systems   Constitutional: Negative for chills and fever. Respiratory: Negative for cough, shortness of breath and wheezing. Cardiovascular: Positive for leg swelling (improvement). Negative for chest pain and palpitations. Neurological: Negative for dizziness and light-headedness.        Current Outpatient Medications on File Prior to Visit   Medication Sig Dispense Refill    meloxicam (MOBIC) 7.5 MG tablet Take 7.5 mg by mouth daily      metoprolol succinate (TOPROL XL) 25 MG extended release tablet Take 1 tablet by mouth daily 30 tablet 5    montelukast (SINGULAIR) 10 MG tablet Take 1 tablet by mouth nightly 30 tablet 3    Azelastine-Fluticasone (DYMISTA) 137-50 MCG/ACT SUSP by Nasal route      omeprazole (PRILOSEC) 20 MG delayed release capsule Take 20 mg by mouth daily      meclizine (ANTIVERT) 12.5 MG tablet Take 12.5 mg by mouth 3 times daily as needed      magnesium oxide (MAG-OX) 400 MG tablet Take 400 mg by mouth daily      Cyanocobalamin (VITAMIN B 12 PO) Take by mouth      vitamin D (CHOLECALCIFEROL) 1000 UNIT TABS tablet Take 1,000 Units by mouth 2 times daily       Biotin w/ Vitamins C & E (HAIR/SKIN/NAILS PO) Take by mouth      Multiple Vitamins-Minerals (MULTIVITAMIN ADULT PO) Take by mouth       No current facility-administered medications on file prior to visit. Allergies   Allergen Reactions    Demerol Hcl [Meperidine]      Other reaction(s): Intolerance  \"went into shok\"    Statins Other (See Comments)       Past medical, surgical, socialand/or family history reviewed, updated as needed, and are non-contributory (unless otherwise stated). Medications, allergies, and problem list also reviewed and updated as needed in patient's record. Wt Readings from Last 3 Encounters:   10/16/20 130 lb (59 kg)   10/14/20 130 lb 3.2 oz (59.1 kg)   10/02/20 130 lb (59 kg)                   /78   Pulse 54   Temp 96.6 °F (35.9 °C) (Temporal)   Resp 16   Ht 5' 5\" (1.651 m)   Wt 130 lb 3.2 oz (59.1 kg)   SpO2 96%   BMI 21.67 kg/m²        Physical Exam  Vitals signs and nursing note reviewed. Constitutional:       Appearance: She is well-developed. Cardiovascular:      Rate and Rhythm: Normal rate and regular rhythm. Heart sounds: Normal heart sounds. No murmur. No friction rub. Pulmonary:      Effort: Pulmonary effort is normal. No respiratory distress. Breath sounds: Normal breath sounds. No stridor. No wheezing or rales. Abdominal:      General: Bowel sounds are normal. There is no distension. Palpations: Abdomen is soft. There is no mass. Tenderness: There is no abdominal tenderness. There is no guarding or rebound. Musculoskeletal: Normal range of motion. Right lower leg: No edema. Left lower leg: No edema.        Results for orders placed or performed during the hospital encounter of 08/28/20   TSH   Result Value Ref Range    TSH 2.400 0.270 - 4.200 uIU/mL       ASSESSMENT/PLAN  [place] was seen today for follow-up and medication refill. Diagnoses and all orders for this visit:    Moderate episode of recurrent major depressive disorder (HCC)  -     FLUoxetine (PROZAC) 10 MG capsule; Take 1 capsule by mouth daily    Age-related osteoporosis without current pathological fracture  -     alendronate (FOSAMAX) 70 MG tablet; Take 1 tablet by mouth every 7 days    Memory loss  -     memantine (NAMENDA) 5 MG tablet; TAKE ONE TABLET BY MOUTH TWO TIMES A DAY    Simple chronic bronchitis (HCC)  -     Umeclidinium Bromide (INCRUSE ELLIPTA) 62.5 MCG/INH AEPB; Inhale 1 puff into the lungs daily    Other orders  -     mirtazapine (REMERON) 30 MG tablet; Take 1 tablet by mouth nightly  -     Handicap Placard MISC; by Does not apply route Unable to ambulate more than 40 feet without difficulty  Expires 10/14/2025    will wean off of remeron and start prozac for depression. Phone/MyChart follow up if tests abnormal.    Return in about 6 weeks (around 11/25/2020) for depression . or sooner if necessary. I have reviewed myfindings and recommendations with Paolo Restrepo.  Dirk Hendrix M.D

## 2020-10-16 ENCOUNTER — OFFICE VISIT (OUTPATIENT)
Dept: PHYSICAL MEDICINE AND REHAB | Age: 85
End: 2020-10-16
Payer: MEDICARE

## 2020-10-16 VITALS
WEIGHT: 130 LBS | DIASTOLIC BLOOD PRESSURE: 84 MMHG | BODY MASS INDEX: 20.89 KG/M2 | HEIGHT: 66 IN | SYSTOLIC BLOOD PRESSURE: 160 MMHG

## 2020-10-16 PROCEDURE — 20553 NJX 1/MLT TRIGGER POINTS 3/>: CPT | Performed by: PHYSICAL MEDICINE & REHABILITATION

## 2020-10-16 PROCEDURE — 99213 OFFICE O/P EST LOW 20 MIN: CPT | Performed by: PHYSICAL MEDICINE & REHABILITATION

## 2020-10-16 PROCEDURE — G8420 CALC BMI NORM PARAMETERS: HCPCS | Performed by: PHYSICAL MEDICINE & REHABILITATION

## 2020-10-16 PROCEDURE — G8484 FLU IMMUNIZE NO ADMIN: HCPCS | Performed by: PHYSICAL MEDICINE & REHABILITATION

## 2020-10-16 PROCEDURE — 1123F ACP DISCUSS/DSCN MKR DOCD: CPT | Performed by: PHYSICAL MEDICINE & REHABILITATION

## 2020-10-16 PROCEDURE — 1090F PRES/ABSN URINE INCON ASSESS: CPT | Performed by: PHYSICAL MEDICINE & REHABILITATION

## 2020-10-16 PROCEDURE — 4040F PNEUMOC VAC/ADMIN/RCVD: CPT | Performed by: PHYSICAL MEDICINE & REHABILITATION

## 2020-10-16 PROCEDURE — G8428 CUR MEDS NOT DOCUMENT: HCPCS | Performed by: PHYSICAL MEDICINE & REHABILITATION

## 2020-10-16 PROCEDURE — 1036F TOBACCO NON-USER: CPT | Performed by: PHYSICAL MEDICINE & REHABILITATION

## 2020-10-16 RX ORDER — LIDOCAINE HYDROCHLORIDE 10 MG/ML
4 INJECTION, SOLUTION INFILTRATION; PERINEURAL ONCE
Status: COMPLETED | OUTPATIENT
Start: 2020-10-16 | End: 2020-10-16

## 2020-10-16 RX ORDER — ACETAMINOPHEN AND CODEINE PHOSPHATE 300; 15 MG/1; MG/1
1 TABLET ORAL EVERY 6 HOURS PRN
Qty: 20 TABLET | Refills: 0 | Status: SHIPPED | OUTPATIENT
Start: 2020-10-16 | End: 2020-11-15

## 2020-10-16 RX ORDER — TRIAMCINOLONE ACETONIDE 40 MG/ML
40 INJECTION, SUSPENSION INTRA-ARTICULAR; INTRAMUSCULAR ONCE
Status: COMPLETED | OUTPATIENT
Start: 2020-10-16 | End: 2020-10-16

## 2020-10-16 RX ADMIN — LIDOCAINE HYDROCHLORIDE 4 ML: 10 INJECTION, SOLUTION INFILTRATION; PERINEURAL at 11:32

## 2020-10-16 RX ADMIN — TRIAMCINOLONE ACETONIDE 40 MG: 40 INJECTION, SUSPENSION INTRA-ARTICULAR; INTRAMUSCULAR at 11:33

## 2020-10-16 NOTE — PROGRESS NOTES
Eunice Flannery D.O. [facility]  [address] [facility] [place]  Phone: [phone]  Fax: [phone]        10/16/20    Chief Complaint   Patient presents with    Injections     right shoulder       HPI:  Helena Antonio is a 80y.o. year old woman seen today in follow up regarding back pain. Interval history: Since the last visit the patient has new right thoracic pain after no new injury and right sacroiliac joint pain recurrence after last injection 6/1/20. Today, the pain is rated Pain Score:  10 - Worst pain ever where 0 is no pain and 10 is pain as bad as it can be. The pain is located in the right buttock and right mid back,  does not radiate  and is described as sharp. This pain occurs all day. The symptoms have been worse since onset. Symptoms are exacerbated by moving, breathing. Factors which relieve the pain include nothing. Other associated symptoms include stiffness. Otherwise, the pain assessment has not changed since the last visit. Past Medical History:   Diagnosis Date    Asthma     COPD (chronic obstructive pulmonary disease) ([facility] [address]     Hyperlipidemia     Hypertension        Past Surgical History:   Procedure Laterality Date    CATARACT REMOVAL WITH IMPLANT      right eye    HYSTERECTOMY         Social History     Tobacco Use    Smoking status: Never Smoker    Smokeless tobacco: Never Used   Substance Use Topics    Alcohol use: No     Comment: 1 cup of coffee a day     Drug use: No       No family history on file. Current Outpatient Medications   Medication Sig Dispense Refill    acetaminophen-codeine (TYLENOL #2) 300-15 MG per tablet Take 1 tablet by mouth every 6 hours as needed for Pain for up to 30 days.  20 tablet 0    alendronate (FOSAMAX) 70 MG tablet Take 1 tablet by mouth every 7 days 12 tablet 2    memantine (NAMENDA) 5 MG tablet TAKE ONE TABLET BY MOUTH TWO TIMES A  tablet 3    mirtazapine (REMERON) 30 MG tablet Take 1 tablet by mouth nightly 90 tablet 3    Umeclidinium Bromide (INCRUSE ELLIPTA) 62.5 MCG/INH AEPB Inhale 1 puff into the lungs daily 3 each 2    FLUoxetine (PROZAC) 10 MG capsule Take 1 capsule by mouth daily 30 capsule 3    Handicap Placard MISC by Does not apply route Unable to ambulate more than 40 feet without difficulty  Expires 10/14/2025 1 each 0    meloxicam (MOBIC) 7.5 MG tablet Take 7.5 mg by mouth daily      metoprolol succinate (TOPROL XL) 25 MG extended release tablet Take 1 tablet by mouth daily 30 tablet 5    montelukast (SINGULAIR) 10 MG tablet Take 1 tablet by mouth nightly 30 tablet 3    Azelastine-Fluticasone (DYMISTA) 137-50 MCG/ACT SUSP by Nasal route      omeprazole (PRILOSEC) 20 MG delayed release capsule Take 20 mg by mouth daily      meclizine (ANTIVERT) 12.5 MG tablet Take 12.5 mg by mouth 3 times daily as needed      magnesium oxide (MAG-OX) 400 MG tablet Take 400 mg by mouth daily      Cyanocobalamin (VITAMIN B 12 PO) Take by mouth      vitamin D (CHOLECALCIFEROL) 1000 UNIT TABS tablet Take 1,000 Units by mouth 2 times daily       Biotin w/ Vitamins C & E (HAIR/SKIN/NAILS PO) Take by mouth      Multiple Vitamins-Minerals (MULTIVITAMIN ADULT PO) Take by mouth       No current facility-administered medications for this visit. Allergies   Allergen Reactions    Demerol Hcl [Meperidine]      Other reaction(s): Intolerance  \"went into shok\"    Statins Other (See Comments)       Review of Systems:  No new weakness, paresthesia, incontinence of bowel or bladder, saddle anesthesia, falls or gait dysfunction. Otherwise, per HPI. Physical Exam:   Blood pressure (!) 160/84, height 5' 6\" (1.676 m), weight 130 lb (59 kg), not currently breastfeeding. GENERAL: The patient is in no apparent distress. Body habitus is non-obese. HEENT: No rhinorrhea, sneezing, yawning, or lacrimation. No scleral icterus or conjunctival injection. SKIN: No piloerection.  No tract marks. No rash. PSYCH: Mood and affect are appropriate. Hygiene is appropriate. CARDIOVASCULAR  Heart is regular rate and rhythm. There is no edema. RESPIRATORY: Respirations are regular and unlabored. There is no cyanosis. GASTROINTESTINAL: Soft abdomen, non-tender. MSK: There is no joint effusion, deformity, instability, swelling, erythema or warmth. AROM is unable to test due to pain. Spinal curvatures reveal increased thoracic kyphosis. Exquisite tenderness right mid thoracic paraspinals and right SI joint. NEURO: Gait is normal. No focal sensorimotor deficit. Reflexes 2+ and symmetric in lower extremities. Impression:   1. Mid back pain on right side    2. Hip pain    3. Trigger point of thoracic region    4. Sacroiliac joint dysfunction of right side        Plan:  Orders Placed This Encounter   Procedures    XR RIBS RIGHT INCLUDE CHEST (MIN 3 VIEWS)     Standing Status:   Future     Number of Occurrences:   1     Standing Expiration Date:   10/16/2021    XR HIP BILATERAL W AP PELVIS (2 VIEWS)     Standing Status:   Future     Number of Occurrences:   1     Standing Expiration Date:   10/17/2021     Order Specific Question:   Reason for exam:     Answer:   hip pain    XR THORACIC SPINE (3 VIEWS)     Standing Status:   Future     Number of Occurrences:   1     Standing Expiration Date:   10/16/2021    LA INJECT TRIGGER POINTS, 3 OR GREATER       Orders Placed This Encounter   Medications    acetaminophen-codeine (TYLENOL #2) 300-15 MG per tablet     Sig: Take 1 tablet by mouth every 6 hours as needed for Pain for up to 30 days. Dispense:  20 tablet     Refill:  0     Reduce doses taken as pain becomes manageable    triamcinolone acetonide (KENALOG-40) injection 40 mg    lidocaine 1 % injection 4 mL     The patient was educated about the diagnosis, prognosis, indications, risks and benefits of treatment.  An opportunity to ask questions was given to the patient and questions were answered. The patient agreed to proceed with the recommended treatment as described above. Return for test results. Thank you for allowing me to participate in the care of your patient. Erasmo Ang D.O., P.T. Board Certified Physical Medicine and Rehabilitation  Board Certified [facility] [address] [facility]. [facility]  [address] [facility]  Phone: [phone]  Fax: [phone]    10/16/2020    Chief Complaint   Patient presents with    Injections     right shoulder       Last injection: n/a  Taking anticoagulants/antiplatelets: No  Diabetic: No  Febrile/active infection: No    After explaining the indications, risks, benefits and alternatives of a Right thoracic paraspinal trigger point, the patient agreed to proceed. A permit was signed and scanned into the chart. The patient was placed in the seated position. The skin over the trigger point was prepared with alcohol. Using aseptic no touch technique, a 22 gauge, 1 1/2\" needle with 2 cc of Xylocaine 1% was directed into the trigger point. After negative aspiration, the medication was injected in a fanning out method. Adequate hemostasis was achieved and a bandage applied. The patient tolerated the procedure well and was educated in post injection care. The patient was clinically monitored after the injection and left the office without incident. There was post-injection reduction in pain and improved range of motion. Last injection: 6/1/20  Taking anticoagulants/antiplatelets: No  Diabetic: No  Febrile/active infection: No    After explaining the indications, risks, benefits and alternatives of a right sacroiliac joint injection, the patient agreed to proceed. A permit was signed and scanned into the chart. The patient was placed in the prone position and draped for modesty. The skin on the Right upper buttock was prepared with Chloraprep.   Using aseptic no touch technique, a 22 gauge, 3\" needle with 1 cc of Kenalog 40mg/cc and 1 cc of 1% lidocaine was directed to the joint using ultrasound guidance. After negative aspiration, the medication was injected. Adequate hemostasis was achieved and a bandage applied. The patient tolerated the procedure well and was educated in post injection care. The patient was clinically monitored after the injection and left the office without incident. There was post injection reduction in pain. Ultrasound images are scanned in the electronic medical record separately. Controlled Substance Monitoring:    Acute and Chronic Pain Monitoring:   RX Monitoring 10/16/2020   Periodic Controlled Substance Monitoring No signs of potential drug abuse or diversion identified. Orders Placed This Encounter   Procedures    XR THORACIC SPINE (MIN 4 VIEWS)     Standing Status:   Future     Standing Expiration Date:   10/16/2021    XR RIBS RIGHT INCLUDE CHEST (MIN 3 VIEWS)     Standing Status:   Future     Standing Expiration Date:   10/16/2021    ME INJECT TRIGGER POINTS, 3 OR GREATER     Orders Placed This Encounter   Medications    acetaminophen-codeine (TYLENOL #2) 300-15 MG per tablet     Sig: Take 1 tablet by mouth every 6 hours as needed for Pain for up to 30 days. Dispense:  20 tablet     Refill:  0     Reduce doses taken as pain becomes manageable    triamcinolone acetonide (KENALOG-40) injection 40 mg    lidocaine 1 % injection 4 mL     Handout provided regarding RFA  Follow up 3 months    Danae Larson D.O., P.T.   Board Certified Physical Medicine and [address] [place] [address] This Visit     lidocaine 1 % injection 4 mL     Admin Date  10/16/2020  11:32 Action  Given Dose  4 mL Route  Other Site   Administered By  Maryellen Boone MA    Ordering Provider:  Trish Scheuermann, DO [address]:  [id number]    Lot#:  [id number]    :  cheerapp    Patient Supplied?:  No    Comments:  EXP:  07/2021          triamcinolone acetonide (KENALOG-40) injection 40 mg     Admin Date  10/16/2020  11:33 Action  Given Dose  40 mg Route  Intra-articular Site   Administered By  Chelsey Alves MA    Ordering Provider:  Annie Fernando DO    NDC:  2604-5092-04    Lot#:  [id number]    :  Scope [employer] [place] (PRIMARY CARE)    Patient Supplied?:  No    Comments:  EXP:  12/2021

## 2020-10-16 NOTE — PATIENT INSTRUCTIONS
Patient Education        Learning About Nerve Ablation for Chronic Pain  What is it? Nerve ablation is a procedure that destroys nerves in an area of pain. Destroying nerves helps reduce or stop pain signals. This can help relieve long-term (chronic) pain. Ablation may be used for chronic low back pain, neck pain, and pain in some other areas of the body. Why is it used? Ablation may be used when other methods of pain control haven't helped. It can work well for some types of pain. How is a nerve block used before ablation? Before the nerve ablation procedure, you will have a test that uses a nerve block. The nerve block tells the doctor if the ablation will relieve your pain. This test numbs specific nerves to help your doctor find the nerves that are causing your pain. Guided by X-rays or ultrasound, the doctor injects a drug on or near the nerve. The drug relieves pain by stopping the nerve from carrying pain signals for a short time. If the nerve block relieves your pain, the ablation may work for you. How is nerve ablation done? Nerve ablation may be done using heat, cold, or chemicals. You'll be awake for the procedure so you can give feedback to your doctor. You'll get a local anesthetic to numb the area. And you'll get medicine to help you relax. During the procedure, your doctor may use X-rays or ultrasound to see where to put the hollow needle that will be used. Your doctor will place the needle under your skin and destroy the nerve tissue. You may feel buzzing or tingling. How well does it work? The amount of pain relief from ablation, and how long the relief lasts, varies by the cause of your pain and its location. It may not stop the pain for good. The destroyed nerve may grow back. If it does, the pain may come back. But you can have the procedure again if you need to. Nerve ablation doesn't work for everyone. Follow-up care is a key part of your treatment and safety.  Be sure to make and go to all appointments, and call your doctor if you are having problems. It's also a good idea to know your test results and keep a list of the medicines you take. Where can you learn more? Go to https://[employer]. org and sign in to your [employer] account. Enter N125 in the [employer] box to learn more about \"Learning About Nerve Ablation for Chronic Pain. \"     If you do not have an account, please click on the \"Sign Up Now\" link. Current as of: November 20, 2019               Content Version: 12.6  © 8686-9002 [employer]. Care instructions adapted under license by [facility] ([facility]). If you have questions about a medical condition or this instruction, always ask your healthcare professional. [address] any warranty or liability for your use of this information.

## 2020-10-19 ENCOUNTER — TELEPHONE (OUTPATIENT)
Dept: PHYSICAL MEDICINE AND REHAB | Age: 85
End: 2020-10-19

## 2020-10-19 NOTE — TELEPHONE ENCOUNTER
----- Message from Heath Anderson DO sent at 10/16/2020  5:11 PM EDT -----  Reviewed test abnormal, inform patient that it showed no fracture. There are degenerative changes.

## 2020-10-19 NOTE — TELEPHONE ENCOUNTER
Called and spoke with the patient daughter Papi Robbins to inform her of the results of her mothers xray. Patient daughter is aware of the hiatal hernia and voiced understanding.

## 2020-10-19 NOTE — TELEPHONE ENCOUNTER
----- Message from Garrett Morales DO sent at 10/16/2020  5:11 PM EDT -----  Test results are normal please notify patient.

## 2020-10-19 NOTE — TELEPHONE ENCOUNTER
----- Message from Woody Say, DO sent at 10/16/2020  5:13 PM EDT -----  Reviewed test abnormal, inform patient that it showed a hiatal hernia. I am not sure if she was aware of this. I will defer to her PCP regarding the hiatal hernia.

## 2020-10-30 ENCOUNTER — TELEPHONE (OUTPATIENT)
Dept: PHYSICAL MEDICINE AND REHAB | Age: 85
End: 2020-10-30

## 2020-10-30 RX ORDER — MELOXICAM 7.5 MG/1
TABLET ORAL
Qty: 90 TABLET | Refills: 0 | Status: SHIPPED
Start: 2020-10-30 | End: 2021-05-11 | Stop reason: SDUPTHER

## 2020-10-30 NOTE — TELEPHONE ENCOUNTER
Follow up call after hip injection 10/16/2020. States has pain between should blades with pain level of 4-5 and right hip pain at a level of 3. Daughter is questioning if she should be put back on Meloxicam 7.5mg or not . Per medication review medication is still listed as taking. Please advise.

## 2020-10-30 NOTE — TELEPHONE ENCOUNTER
Called and spoke with the patient daughter Loyda Arnold to inform her that her mother can take meloxicam as needed. Patient daughter stated that she will need a refill. Medication is keyed up. Please advise.

## 2020-11-25 ENCOUNTER — VIRTUAL VISIT (OUTPATIENT)
Dept: FAMILY MEDICINE CLINIC | Age: 85
End: 2020-11-25
Payer: MEDICARE

## 2020-11-25 ENCOUNTER — TELEPHONE (OUTPATIENT)
Dept: ADMINISTRATIVE | Age: 85
End: 2020-11-25

## 2020-11-25 PROCEDURE — 1090F PRES/ABSN URINE INCON ASSESS: CPT | Performed by: FAMILY MEDICINE

## 2020-11-25 PROCEDURE — 1123F ACP DISCUSS/DSCN MKR DOCD: CPT | Performed by: FAMILY MEDICINE

## 2020-11-25 PROCEDURE — 99213 OFFICE O/P EST LOW 20 MIN: CPT | Performed by: FAMILY MEDICINE

## 2020-11-25 PROCEDURE — G8427 DOCREV CUR MEDS BY ELIG CLIN: HCPCS | Performed by: FAMILY MEDICINE

## 2020-11-25 PROCEDURE — 4040F PNEUMOC VAC/ADMIN/RCVD: CPT | Performed by: FAMILY MEDICINE

## 2020-11-25 RX ORDER — FLUOXETINE HYDROCHLORIDE 20 MG/1
20 CAPSULE ORAL DAILY
Qty: 30 CAPSULE | Refills: 2 | Status: SHIPPED
Start: 2020-11-25 | End: 2021-01-04 | Stop reason: SDUPTHER

## 2020-11-25 NOTE — PROGRESS NOTES
TeleMedicine Patient Consent    This visit was performed as a virtual video visit using a synchronous, two-way, audio-video telehealth technology platform. Patient identification was verified at the start of the visit, including the patient's telephone number and physical location. I discussed with the patient the nature of our telehealth visits, that:     1. Due to the nature of an audio- video modality, the only components of a physical exam that could be done are the elements supported by direct observation. 2. I would evaluate the patient and recommend diagnostics and treatments based on my assessment. 3. If it was felt that the patient should be evaluated in clinic or an emergency room setting, then they would be directed there. 4. Our sessions are not being recorded and that personal health information is protected. 5. Our team would provide follow up care in person if/when the patient needs it. Patient does agree to proceed with telemedicine consultation. Patient's location: home address in [facility]  Physician  location other address in [facility] other people involved in call , daughter, Laurence Vogt         Time spent: Greater than Not billed by time    This visit was completed virtually using Doxy. me    This visit was performed during the 8202 public health crisis and COVID-19 pandemic. *Add 95 modifier to all Video Visits*    CC:  depression    HPI:  80 y.o. female presents for follow up of depression. Stopped the mirtazapine . Started prozac. Not as tired as she was before. Continues to have disequilibrium. Is sleeping ok. Wakes up early in the morning but does not get out of bed until 9 am. Sleeps on and off from 3-9. No falls. Had a 12year old dog that recently passed away and she is taking this hard. For a week she has not said that she feels awful. Seems to be feeling better from an aches and pains point of view. Will only be having 3 people at [facility].      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    Posterior vitreous detachment 02/11/2015    Senile nuclear sclerosis 02/11/2015       Current Outpatient Medications on File Prior to Visit   Medication Sig Dispense Refill    meloxicam (MOBIC) 7.5 MG tablet TAKE ONE TABLET BY MOUTH DAILY 90 tablet 0    alendronate (FOSAMAX) 70 MG tablet Take 1 tablet by mouth every 7 days 12 tablet 2    memantine (NAMENDA) 5 MG tablet TAKE ONE TABLET BY MOUTH TWO TIMES A  tablet 3    Umeclidinium Bromide (INCRUSE ELLIPTA) 62.5 MCG/INH AEPB Inhale 1 puff into the lungs daily 3 each 2    Handicap Placard MISC by Does not apply route Unable to ambulate more than 40 feet without difficulty  Expires 10/14/2025 1 each 0    metoprolol succinate (TOPROL XL) 25 MG extended release tablet Take 1 tablet by mouth daily 30 tablet 5    montelukast (SINGULAIR) 10 MG tablet Take 1 tablet by mouth nightly 30 tablet 3    Azelastine-Fluticasone (DYMISTA) 137-50 MCG/ACT SUSP by Nasal route      omeprazole (PRILOSEC) 20 MG delayed release capsule Take 20 mg by mouth daily      meclizine (ANTIVERT) 12.5 MG tablet Take 12.5 mg by mouth 3 times daily as needed      magnesium oxide (MAG-OX) 400 MG tablet Take 400 mg by mouth daily      Cyanocobalamin (VITAMIN B 12 PO) Take by mouth      vitamin D (CHOLECALCIFEROL) 1000 UNIT TABS tablet Take 1,000 Units by mouth 2 times daily       Biotin w/ Vitamins C & E (HAIR/SKIN/NAILS PO) Take by mouth      Multiple Vitamins-Minerals (MULTIVITAMIN ADULT PO) Take by mouth       No current facility-administered medications on file prior to visit. Allergies   Allergen Reactions    Demerol Hcl [Meperidine]      Other reaction(s):  Intolerance  \"went into shok\"    Statins Other

## 2020-11-25 NOTE — TELEPHONE ENCOUNTER
Pt's daughter called to change Pt's appt scheduled for today from an OV to a VV . Stating she didn't want to bring mother out with everything going on.  Appt has been changed to a VV

## 2020-12-09 ENCOUNTER — OFFICE VISIT (OUTPATIENT)
Dept: CARDIOLOGY CLINIC | Age: 85
End: 2020-12-09
Payer: MEDICARE

## 2020-12-09 VITALS
HEART RATE: 56 BPM | SYSTOLIC BLOOD PRESSURE: 118 MMHG | WEIGHT: 125 LBS | OXYGEN SATURATION: 97 % | BODY MASS INDEX: 20.09 KG/M2 | HEIGHT: 66 IN | RESPIRATION RATE: 20 BRPM | DIASTOLIC BLOOD PRESSURE: 72 MMHG

## 2020-12-09 PROCEDURE — 1090F PRES/ABSN URINE INCON ASSESS: CPT | Performed by: INTERNAL MEDICINE

## 2020-12-09 PROCEDURE — 93000 ELECTROCARDIOGRAM COMPLETE: CPT | Performed by: INTERNAL MEDICINE

## 2020-12-09 PROCEDURE — 1036F TOBACCO NON-USER: CPT | Performed by: INTERNAL MEDICINE

## 2020-12-09 PROCEDURE — G8484 FLU IMMUNIZE NO ADMIN: HCPCS | Performed by: INTERNAL MEDICINE

## 2020-12-09 PROCEDURE — 99213 OFFICE O/P EST LOW 20 MIN: CPT | Performed by: INTERNAL MEDICINE

## 2020-12-09 PROCEDURE — 4040F PNEUMOC VAC/ADMIN/RCVD: CPT | Performed by: INTERNAL MEDICINE

## 2020-12-09 PROCEDURE — G8427 DOCREV CUR MEDS BY ELIG CLIN: HCPCS | Performed by: INTERNAL MEDICINE

## 2020-12-09 PROCEDURE — G8420 CALC BMI NORM PARAMETERS: HCPCS | Performed by: INTERNAL MEDICINE

## 2020-12-09 PROCEDURE — 1123F ACP DISCUSS/DSCN MKR DOCD: CPT | Performed by: INTERNAL MEDICINE

## 2020-12-09 NOTE — PROGRESS NOTES
Chief Complaint   Patient presents with    Irregular Heart Beat       Patient Active Problem List    Diagnosis Date Noted    Hypertension     SVT (supraventricular tachycardia) ([address]     Hyperlipidemia     Memory loss 01/19/2017     Overview Note:     Overview:   MOCA (1/2017) 25  MMSE (1/2017) 27    Last Assessment & Plan:   Suspect that complaints are mostly related to depression, though cannot completely exclude possibility of an early dementia syndrome  - will address depression  - monitor functional status  - check B12 and CT of the brain  - check MOCA again in 6 months  - no indication for cognitive enhancers at this time      Moderate single current episode of major depressive disorder ([address] 01/19/2017     Overview Note:     Last Assessment & Plan:   Significant depression symptoms: feels depressed, low energy, poor apettite, poor sleep, problems with concentration, poor motivation, loss of interests  - increased sertraline to 100 mg  - monitor for tolerability and efficacy  - can consider mirtazapine, venlafaxine in the future if response inadequate  - significant caregiver stress noted - advised her to block off time for her own physical and mental wellbeing  - advised her to start taking a daily walking  - discussed role of psychotherapy in her case - would benefit from counseling, coping strategies; will consider in the future      Ventricular ectopy 10/31/2016     Overview Note:     A. [facility] workup 2016 with minimal CAD on cath and normal echo. Holter isolated ectopy. Flecainide trial resulted in bradycardia  B.  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) for 24 seconds      Posterior vitreous detachment 02/11/2015    Senile nuclear sclerosis 02/11/2015       Current Outpatient Medications   Medication Sig Dispense Refill    FLUoxetine (PROZAC) 20 MG capsule Take 1 capsule by mouth daily 30 capsule 2    meloxicam (MOBIC) 7.5 MG tablet TAKE ONE TABLET BY MOUTH DAILY 90 tablet 0    alendronate (FOSAMAX) 70 MG tablet Take 1 tablet by mouth every 7 days 12 tablet 2    memantine (NAMENDA) 5 MG tablet TAKE ONE TABLET BY MOUTH TWO TIMES A  tablet 3    Umeclidinium Bromide (INCRUSE ELLIPTA) 62.5 MCG/INH AEPB Inhale 1 puff into the lungs daily 3 each 2    Handicap Placard MISC by Does not apply route Unable to ambulate more than 40 feet without difficulty  Expires 10/14/2025 1 each 0    metoprolol succinate (TOPROL XL) 25 MG extended release tablet Take 1 tablet by mouth daily 30 tablet 5    montelukast (SINGULAIR) 10 MG tablet Take 1 tablet by mouth nightly 30 tablet 3    Azelastine-Fluticasone (DYMISTA) 137-50 MCG/ACT SUSP by Nasal route      omeprazole (PRILOSEC) 20 MG delayed release capsule Take 20 mg by mouth daily      meclizine (ANTIVERT) 12.5 MG tablet Take 12.5 mg by mouth 3 times daily as needed      magnesium oxide (MAG-OX) 400 MG tablet Take 400 mg by mouth daily      Cyanocobalamin (VITAMIN B 12 PO) Take by mouth      vitamin D (CHOLECALCIFEROL) 1000 UNIT TABS tablet Take 1,000 Units by mouth 2 times daily       Biotin w/ Vitamins C & E (HAIR/SKIN/NAILS PO) Take by mouth      Multiple Vitamins-Minerals (MULTIVITAMIN ADULT PO) Take by mouth       No current facility-administered medications for this visit. Allergies   Allergen Reactions    Demerol Hcl [Meperidine]      Other reaction(s): Intolerance  \"went into shok\"    Statins Other (See Comments)       Vitals:    12/09/20 1051   BP: 118/72   Site: Right Upper Arm   Position: Sitting   Cuff Size: Medium Adult   Pulse: 56   Resp: 20   SpO2: 97%   Weight: 125 lb (56.7 kg)   Height: 5' 6\" (1.676 m)       Social History     Socioeconomic History    Marital status:       Spouse name: Not on file    Number of children: Not on file    Years of education: Not on file    Highest education level: Not on file   Occupational History    Not on file   Social Needs    Financial resource strain: Not on file    Food insecurity     Worry: Not on file     Inability: Not on file    Transportation needs     Medical: Not on file     Non-medical: Not on file   Tobacco Use    Smoking status: Never Smoker    Smokeless tobacco: Never Used   Substance and Sexual Activity    Alcohol use: No     Comment: 1 cup of coffee a day     Drug use: No    Sexual activity: Not on file   Lifestyle    Physical activity     Days per week: Not on file     Minutes per session: Not on file    Stress: Not on file   Relationships    Social connections     Talks on phone: Not on file     Gets together: Not on file     Attends Sikh service: Not on file     Active member of club or organization: Not on file     Attends meetings of clubs or organizations: Not on file     Relationship status: Not on file    Intimate partner violence     Fear of current or ex partner: Not on file     Emotionally abused: Not on file     Physically abused: Not on file     Forced sexual activity: Not on file   Other Topics Concern    Not on file   Social History Narrative    Not on file       History reviewed. No pertinent family history. SUBJECTIVE: Rolanda Abdalla presents to the office today for followup of chronic cardiac diagnoses. Since our last visit she has been compliant with low dose toprol and has no more fluttering in chest.  No near or actual syncope, tho' her daughter sees HRs at times in the 40's on pulse oximetry. Memory no worse off aricept. She complains of her chronic fatigue that antedates beta blocker  and denies   chest pain, chest pressure/discomfort, claudication, exertional chest pressure/discomfort, lower extremity edema, near-syncope, orthopnea, paroxysmal nocturnal dyspnea, syncope and tachypnea. Lives with grandson, does inside light chores only. Review of Systems:   Heart: as above   Lungs: as above   Eyes: denies changes in vision or discharge.    Ears: denies changes in hearing or pain. Nose: denies epistaxis or masses   Throat: denies sore throat or trouble swallowing. Neuro: denies numbness, tingling, tremors. Skin: denies rashes or itching. : denies hematuria, dysuria   GI: denies vomiting, diarrhea   Psych: denies mood changed, anxiety, depression. all others negative. Physical Exam   /72 (Site: Right Upper Arm, Position: Sitting, Cuff Size: Medium Adult)   Pulse 56   Resp 20   Ht 5' 6\" (1.676 m)   Wt 125 lb (56.7 kg)   SpO2 97%   BMI 20.18 kg/m²   Constitutional: appropriate. Slow of speech. Well-developed and well-nourished. No distress. Head: Normocephalic and atraumatic. Eyes: EOM are normal. Pupils are equal, round, and reactive to light. Neck: Normal range of motion. Neck supple. No hepatojugular reflux and no JVD present. Carotid bruit is not present. No tracheal deviation present. No thyromegaly present. Cardiovascular: Normal rate, regular rhythm, normal heart sounds and intact distal pulses. Exam reveals no gallop and no friction rub. No murmur heard. Pulmonary/Chest: Effort normal and breath sounds normal. No respiratory distress. No wheezes. No rales. No tenderness. Abdominal: Soft. Bowel sounds are normal. No distension and no mass. No tenderness. No rebound and no guarding. Musculoskeletal: Normal range of motion. No edema and no tenderness. Neurological: Alert and oriented to person, place, and time. Skin: Skin is warm and dry. No rash noted. Not diaphoretic. No erythema. Psychiatric: Normal mood and affect. Behavior is normal.         ASSESSMENT AND PLAN:  Patient Active Problem List   Diagnosis    Memory loss    Moderate single current episode of major depressive disorder         SVT    Ventricular ectopy    Hyperlipidemia     Patient with hx of PVCs in past with no evidence of structual or ischemic heart disease by [facility] evaluation [id number]  Monitor captures the PVCs again, and short NSVT.   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 NO atrial fibrillation  N.B. severe magdi with AAD in past (flecainide), and low dose beta blocker has taken her HR down to 50 bpm.   No indication for pacing.  Would continue the beta blocker at present dose as it has calmed down her ectopic beating  If HR progressively slows can drop dose or consider PCK implantation to allow adequate beta blocker dosing    OV prn      Genaro Lr M.D  [id number] [facility]

## 2021-01-04 ENCOUNTER — VIRTUAL VISIT (OUTPATIENT)
Dept: FAMILY MEDICINE CLINIC | Age: 86
End: 2021-01-04
Payer: MEDICARE

## 2021-01-04 DIAGNOSIS — F33.1 MODERATE EPISODE OF RECURRENT MAJOR DEPRESSIVE DISORDER (HCC): ICD-10-CM

## 2021-01-04 DIAGNOSIS — G31.84 MILD COGNITIVE IMPAIRMENT: Primary | ICD-10-CM

## 2021-01-04 PROCEDURE — G8427 DOCREV CUR MEDS BY ELIG CLIN: HCPCS | Performed by: FAMILY MEDICINE

## 2021-01-04 PROCEDURE — 1090F PRES/ABSN URINE INCON ASSESS: CPT | Performed by: FAMILY MEDICINE

## 2021-01-04 PROCEDURE — 99213 OFFICE O/P EST LOW 20 MIN: CPT | Performed by: FAMILY MEDICINE

## 2021-01-04 PROCEDURE — 4040F PNEUMOC VAC/ADMIN/RCVD: CPT | Performed by: FAMILY MEDICINE

## 2021-01-04 PROCEDURE — 1123F ACP DISCUSS/DSCN MKR DOCD: CPT | Performed by: FAMILY MEDICINE

## 2021-01-04 RX ORDER — FLUOXETINE HYDROCHLORIDE 40 MG/1
40 CAPSULE ORAL DAILY
Qty: 90 CAPSULE | Refills: 3 | Status: SHIPPED
Start: 2021-01-04 | End: 2021-03-17 | Stop reason: CLARIF

## 2021-01-04 NOTE — PROGRESS NOTES
TeleMedicine Patient Consent    This visit was performed as a virtual video visit using a synchronous, two-way, audio-video telehealth technology platform. Patient identification was verified at the start of the visit, including the patient's telephone number and physical location. I discussed with the patient the nature of our telehealth visits, that:     1. Due to the nature of an audio- video modality, the only components of a physical exam that could be done are the elements supported by direct observation. 2. I would evaluate the patient and recommend diagnostics and treatments based on my assessment. 3. If it was felt that the patient should be evaluated in clinic or an emergency room setting, then they would be directed there. 4. Our sessions are not being recorded and that personal health information is protected. 5. Our team would provide follow up care in person if/when the patient needs it. Patient does agree to proceed with telemedicine consultation. Patient's location: home address in [facility]  Physician  location other address in [facility] other people involved in call daughter, Alfred Carrasco         Time spent: Greater than Not billed by time    This visit was completed virtually using Doxy. me      This visit was performed during the 2016 public health crisis and COVID-19 pandemic. *Add 95 modifier to all Video Visits*    CC:    Chief Complaint   Patient presents with    Depression     6 wk f/u          HPI:  80 y.o. female presents for follow up of depression. Feeling tired. Sometimes aches. Has been sleeping ok. Increased prozac . Has noticed a small difference. Still tired but has more energy. Less apathy. Memory is giving. Saw cardiologist. Has been taking half of the metoprolol and does not have as much bradycardia. Lowest is the 50s . Has been taking mobic as needed when she is achy. This helps with aching. Stool seems to be softer. Had a couple episodes where she did not make it to the bathroom. Only happened three times.      Patient Active Problem List    Diagnosis Date Noted    Hypertension     [facility]     Hyperlipidemia     Memory loss 01/19/2017    Moderate single current episode of major depressive disorder ([facility] 01/19/2017    Ventricular ectopy 10/31/2016    Posterior vitreous detachment 02/11/2015    Senile nuclear sclerosis 02/11/2015       Current Outpatient Medications on File Prior to Visit   Medication Sig Dispense Refill    meloxicam (MOBIC) 7.5 MG tablet TAKE ONE TABLET BY MOUTH DAILY 90 tablet 0    alendronate (FOSAMAX) 70 MG tablet Take 1 tablet by mouth every 7 days 12 tablet 2    memantine (NAMENDA) 5 MG tablet TAKE ONE TABLET BY MOUTH TWO TIMES A  tablet 3    Umeclidinium Bromide (INCRUSE ELLIPTA) 62.5 MCG/INH AEPB Inhale 1 puff into the lungs daily 3 each 2    Handicap Placard MISC by Does not apply route Unable to ambulate more than 40 feet without difficulty  Expires 10/14/2025 1 each 0    metoprolol succinate (TOPROL XL) 25 MG extended release tablet Take 1 tablet by mouth daily (Patient taking differently: Take 12.5 mg by mouth daily ) 30 tablet 5    montelukast (SINGULAIR) 10 MG tablet Take 1 tablet by mouth nightly 30 tablet 3    Azelastine-Fluticasone (DYMISTA) 137-50 MCG/ACT SUSP by Nasal route      omeprazole (PRILOSEC) 20 MG delayed release capsule Take 20 mg by mouth daily      meclizine (ANTIVERT) 12.5 MG tablet Take 12.5 mg by mouth 3 times daily as needed      magnesium oxide (MAG-OX) 400 MG tablet Take 400 mg by mouth daily      Cyanocobalamin (VITAMIN B 12 PO) Take by mouth      vitamin D (CHOLECALCIFEROL) 1000 UNIT TABS tablet Take 1,000 Units by mouth 2 times daily       Biotin w/ Vitamins C & E (HAIR/SKIN/NAILS PO) Take by mouth      Multiple Vitamins-Minerals (MULTIVITAMIN ADULT PO) Take by mouth No current facility-administered medications on file prior to visit. Allergies   Allergen Reactions    Demerol Hcl [Meperidine]      Other reaction(s): Intolerance  \"went into shok\"    Statins Other (See Comments)       Social History     Tobacco Use    Smoking status: Never Smoker    Smokeless tobacco: Never Used   Substance Use Topics    Alcohol use: No     Comment: 1 cup of coffee a day     Drug use: No       ROS:   Review of Systems - General ROS: negative  Respiratory ROS: no cough, shortness of breath, or wheezing  Cardiovascular ROS: no chest pain or dyspnea on exertion  Gastrointestinal ROS: positive for - change in bowel habits , see HPI   Neuro - +tremor - on and off for years     Physical Exam:    General: well appearing, NAD  Skin: no rashes noted   Psych: mood and affect wnl       Assessments:     Emilee was seen today for depression. Diagnoses and all orders for this visit:    Mild cognitive impairment    Moderate episode of recurrent major depressive disorder (HCC)  -     FLUoxetine (PROZAC) 40 MG capsule; Take 1 capsule by mouth daily    increase prozac to 40mg . Orders as above. RTO 1 month or sooner prn. Advised to please be adherent to the treatment plans discussed today, and please call with any questions or concerns, letting the office know of any reasons that the plans may not be followed. The risks of untreated conditions include worsening illness, injury, disability, and possibly, death. Please call if symptoms change in any way, worsen, or fail to completely resolve, as this could necessitate a change to treatment plans. Patient and/or caregiver expressed understanding. Indications and proper use of medication(s) reviewed. Potential side-effects and risks of medication(s) also explained. Patient and/or caregiver was instructed to call if any new symptoms develop prior to next visit. This visit was performed during the 0671 public health crisis and COVID-19 pandemic.   *Add 95 modifier to all Video Visits*

## 2021-01-25 ENCOUNTER — IMMUNIZATION (OUTPATIENT)
Dept: PRIMARY CARE CLINIC | Age: 86
End: 2021-01-25
Payer: MEDICARE

## 2021-01-25 DIAGNOSIS — Z23 NEED FOR VACCINATION: Primary | ICD-10-CM

## 2021-01-25 PROCEDURE — 91301 COVID-19, MODERNA VACCINE 100MCG/0.5ML DOSE: CPT | Performed by: NURSE PRACTITIONER

## 2021-01-25 PROCEDURE — 0011A COVID-19, MODERNA VACCINE 100MCG/0.5ML DOSE: CPT | Performed by: NURSE PRACTITIONER

## 2021-02-03 ENCOUNTER — VIRTUAL VISIT (OUTPATIENT)
Dept: FAMILY MEDICINE CLINIC | Age: 86
End: 2021-02-03
Payer: MEDICARE

## 2021-02-03 DIAGNOSIS — R41.3 MEMORY LOSS: ICD-10-CM

## 2021-02-03 DIAGNOSIS — R53.83 FATIGUE, UNSPECIFIED TYPE: Primary | ICD-10-CM

## 2021-02-03 DIAGNOSIS — M81.0 AGE-RELATED OSTEOPOROSIS WITHOUT CURRENT PATHOLOGICAL FRACTURE: ICD-10-CM

## 2021-02-03 PROCEDURE — 1123F ACP DISCUSS/DSCN MKR DOCD: CPT | Performed by: FAMILY MEDICINE

## 2021-02-03 PROCEDURE — 1090F PRES/ABSN URINE INCON ASSESS: CPT | Performed by: FAMILY MEDICINE

## 2021-02-03 PROCEDURE — 4040F PNEUMOC VAC/ADMIN/RCVD: CPT | Performed by: FAMILY MEDICINE

## 2021-02-03 PROCEDURE — G8427 DOCREV CUR MEDS BY ELIG CLIN: HCPCS | Performed by: FAMILY MEDICINE

## 2021-02-03 PROCEDURE — 99213 OFFICE O/P EST LOW 20 MIN: CPT | Performed by: FAMILY MEDICINE

## 2021-02-03 RX ORDER — MEMANTINE HYDROCHLORIDE 5 MG/1
TABLET ORAL
Qty: 180 TABLET | Refills: 3 | Status: SHIPPED
Start: 2021-02-03 | End: 2021-07-19 | Stop reason: SDUPTHER

## 2021-02-03 RX ORDER — ALENDRONATE SODIUM 70 MG/1
70 TABLET ORAL
Qty: 12 TABLET | Refills: 2 | Status: SHIPPED
Start: 2021-02-03 | End: 2021-05-09 | Stop reason: SDUPTHER

## 2021-02-03 NOTE — PROGRESS NOTES
TeleMedicine Patient Consent    This visit was performed as a virtual video visit using a synchronous, two-way, audio-video telehealth technology platform. Patient identification was verified at the start of the visit, including the patient's telephone number and physical location. I discussed with the patient the nature of our telehealth visits, that:     1. Due to the nature of an audio- video modality, the only components of a physical exam that could be done are the elements supported by direct observation. 2. I would evaluate the patient and recommend diagnostics and treatments based on my assessment. 3. If it was felt that the patient should be evaluated in clinic or an emergency room setting, then they would be directed there. 4. Our sessions are not being recorded and that personal health information is protected. 5. Our team would provide follow up care in person if/when the patient needs it. Patient does agree to proceed with telemedicine consultation. Patient's location: home address in [facility]  Physician  location other address in [facility] other people involved in call none        Time spent: Greater than Not billed by time    This visit was completed virtually using Doxy. me      This encounter began at:  2:26    This encounter ended at:  2:37  Chief Complaint   Patient presents with    Depression         This visit was performed during the 2140 public health crisis and COVID-19 pandemic.   *Add 95 modifier to all Video Visits*    CC:    Chief Complaint   Patient presents with    Depression  vitamin D (CHOLECALCIFEROL) 1000 UNIT TABS tablet Take 1,000 Units by mouth 2 times daily       Biotin w/ Vitamins C & E (HAIR/SKIN/NAILS PO) Take by mouth      Multiple Vitamins-Minerals (MULTIVITAMIN ADULT PO) Take by mouth       No current facility-administered medications on file prior to visit. Allergies   Allergen Reactions    Demerol Hcl [Meperidine]      Other reaction(s): Intolerance  \"went into shok\"    Statins Other (See Comments)       Social History     Tobacco Use    Smoking status: Never Smoker    Smokeless tobacco: Never Used   Substance Use Topics    Alcohol use: No     Comment: 1 cup of coffee a day     Drug use: No       ROS:   Review of Systems - Negative except . See HPI    Physical Exam:    General: well appearing , NAD  Skin: no rashes noted   Psych: mood and affect wnl         Assessments:     Emilee was seen today for depression. Diagnoses and all orders for this visit:    Fatigue, unspecified type  -     CBC; Future  -     COMPREHENSIVE METABOLIC PANEL; Future  -     TSH; Future  -     Vitamin B12 & Folate; Future    Memory loss  -     memantine (NAMENDA) 5 MG tablet; TAKE ONE TABLET BY MOUTH TWO TIMES A DAY    Age-related osteoporosis without current pathological fracture  -     alendronate (FOSAMAX) 70 MG tablet; Take 1 tablet by mouth every 7 days        Plans:  See above   Will discontinue metoprolol at this time due to bradycardia   Orders as above. RTO 6 weeks or sooner prn. Advised to please be adherent to the treatment plans discussed today, and please call with any questions or concerns, letting the office know of any reasons that the plans may not be followed. The risks of untreated conditions include worsening illness, injury, disability, and possibly, death. Please call if symptoms change in any way, worsen, or fail to completely resolve, as this could necessitate a change to treatment plans. Patient and/or caregiver expressed understanding. Indications and proper use of medication(s) reviewed. Potential side-effects and risks of medication(s) also explained. Patient and/or caregiver was instructed to call if any new symptoms develop prior to next visit. This visit was performed during the 1355 public health crisis and COVID-19 pandemic.   *Add 95 modifier to all Video Visits*

## 2021-02-05 DIAGNOSIS — R53.83 FATIGUE, UNSPECIFIED TYPE: ICD-10-CM

## 2021-02-05 LAB
ALBUMIN SERPL-MCNC: 4.7 G/DL (ref 3.5–5.2)
ALP BLD-CCNC: 68 U/L (ref 35–104)
ALT SERPL-CCNC: 13 U/L (ref 0–32)
ANION GAP SERPL CALCULATED.3IONS-SCNC: 18 MMOL/L (ref 7–16)
AST SERPL-CCNC: 17 U/L (ref 0–31)
BILIRUB SERPL-MCNC: 0.5 MG/DL (ref 0–1.2)
BUN BLDV-MCNC: 10 MG/DL (ref 8–23)
CALCIUM SERPL-MCNC: 9.8 MG/DL (ref 8.6–10.2)
CHLORIDE BLD-SCNC: 95 MMOL/L (ref 98–107)
CO2: 22 MMOL/L (ref 22–29)
CREAT SERPL-MCNC: 0.6 MG/DL (ref 0.5–1)
FOLATE: 19.9 NG/ML (ref 4.8–24.2)
GFR AFRICAN AMERICAN: >60
GFR NON-AFRICAN AMERICAN: >60 ML/MIN/1.73
GLUCOSE BLD-MCNC: 103 MG/DL (ref 74–99)
HCT VFR BLD CALC: 44.4 % (ref 34–48)
HEMOGLOBIN: 14.6 G/DL (ref 11.5–15.5)
MCH RBC QN AUTO: 30.7 PG (ref 26–35)
MCHC RBC AUTO-ENTMCNC: 32.9 % (ref 32–34.5)
MCV RBC AUTO: 93.5 FL (ref 80–99.9)
PDW BLD-RTO: 12.7 FL (ref 11.5–15)
PLATELET # BLD: 195 E9/L (ref 130–450)
PMV BLD AUTO: 11.1 FL (ref 7–12)
POTASSIUM SERPL-SCNC: 4 MMOL/L (ref 3.5–5)
RBC # BLD: 4.75 E12/L (ref 3.5–5.5)
SODIUM BLD-SCNC: 135 MMOL/L (ref 132–146)
TOTAL PROTEIN: 7 G/DL (ref 6.4–8.3)
TSH SERPL DL<=0.05 MIU/L-ACNC: 2.41 UIU/ML (ref 0.27–4.2)
VITAMIN B-12: 857 PG/ML (ref 211–946)
WBC # BLD: 5.3 E9/L (ref 4.5–11.5)

## 2021-02-22 ENCOUNTER — IMMUNIZATION (OUTPATIENT)
Dept: PRIMARY CARE CLINIC | Age: 86
End: 2021-02-22
Payer: MEDICARE

## 2021-02-22 PROCEDURE — 0012A COVID-19, MODERNA VACCINE 100MCG/0.5ML DOSE: CPT | Performed by: NURSE PRACTITIONER

## 2021-02-22 PROCEDURE — 91301 COVID-19, MODERNA VACCINE 100MCG/0.5ML DOSE: CPT | Performed by: NURSE PRACTITIONER

## 2021-02-26 ENCOUNTER — TELEPHONE (OUTPATIENT)
Dept: FAMILY MEDICINE CLINIC | Age: 86
End: 2021-02-26

## 2021-02-26 NOTE — TELEPHONE ENCOUNTER
Called patient to discuss how patient is feeling . No answer.  LMOM for patient to call back with best number and time to reach them

## 2021-02-26 NOTE — TELEPHONE ENCOUNTER
Krista Espino from [facility] [place] ([phone]) saw Tenzin Tipton today and pt c/o dizziness, felling off balance and heart rate was 56 today, /82; not taking metoprolol because of the low heart rate. Wondering if you wanted to change anything. Please advise.

## 2021-03-04 ENCOUNTER — TELEPHONE (OUTPATIENT)
Dept: FAMILY MEDICINE CLINIC | Age: 86
End: 2021-03-04

## 2021-03-04 NOTE — TELEPHONE ENCOUNTER
Mom has been very weak , shaky and heart rate is all over the place. Had a fall on Monday. Did not remember this. Discussed that this may be her new normal. We will discontinue any medicines that may be making her feel worse. Will wean off of prozac. And she will let me know how things progress. All questions answered.

## 2021-03-04 NOTE — TELEPHONE ENCOUNTER
Pt daughter called pt been very fatigue and shakey her bp 108/88 heart rate 80-89 should she take metoprolol     Yari Ford [phone]    Last seen 2/3/2021  Next appt 3/17/2021

## 2021-03-17 ENCOUNTER — OFFICE VISIT (OUTPATIENT)
Dept: FAMILY MEDICINE CLINIC | Age: 86
End: 2021-03-17
Payer: MEDICARE

## 2021-03-17 VITALS
HEIGHT: 66 IN | BODY MASS INDEX: 19.61 KG/M2 | SYSTOLIC BLOOD PRESSURE: 122 MMHG | OXYGEN SATURATION: 96 % | HEART RATE: 74 BPM | DIASTOLIC BLOOD PRESSURE: 74 MMHG | TEMPERATURE: 96.4 F | RESPIRATION RATE: 20 BRPM | WEIGHT: 122 LBS

## 2021-03-17 DIAGNOSIS — R39.11 URINARY HESITANCY: ICD-10-CM

## 2021-03-17 DIAGNOSIS — R53.82 CHRONIC FATIGUE: ICD-10-CM

## 2021-03-17 DIAGNOSIS — K92.1 BLACK STOOL: Primary | ICD-10-CM

## 2021-03-17 DIAGNOSIS — K92.1 BLACK STOOL: ICD-10-CM

## 2021-03-17 LAB
AMORPHOUS: ABNORMAL
BACTERIA: ABNORMAL /HPF
BILIRUBIN URINE: NEGATIVE
BLOOD, URINE: NEGATIVE
CLARITY: CLEAR
COLOR: YELLOW
EPITHELIAL CELLS, UA: ABNORMAL /HPF
GLUCOSE URINE: NEGATIVE MG/DL
HCT VFR BLD CALC: 47 % (ref 34–48)
HEMOGLOBIN: 14.9 G/DL (ref 11.5–15.5)
KETONES, URINE: NEGATIVE MG/DL
LEUKOCYTE ESTERASE, URINE: ABNORMAL
MCH RBC QN AUTO: 30.3 PG (ref 26–35)
MCHC RBC AUTO-ENTMCNC: 31.7 % (ref 32–34.5)
MCV RBC AUTO: 95.7 FL (ref 80–99.9)
NITRITE, URINE: NEGATIVE
PDW BLD-RTO: 12.5 FL (ref 11.5–15)
PH UA: 6.5 (ref 5–9)
PLATELET # BLD: 204 E9/L (ref 130–450)
PMV BLD AUTO: 11.6 FL (ref 7–12)
PROTEIN UA: NEGATIVE MG/DL
RBC # BLD: 4.91 E12/L (ref 3.5–5.5)
RBC UA: ABNORMAL /HPF (ref 0–2)
RENAL EPITHELIAL, UA: ABNORMAL /HPF
SPECIFIC GRAVITY UA: 1.02 (ref 1–1.03)
UROBILINOGEN, URINE: 0.2 E.U./DL
WBC # BLD: 7.8 E9/L (ref 4.5–11.5)
WBC UA: ABNORMAL /HPF (ref 0–5)

## 2021-03-17 PROCEDURE — 1090F PRES/ABSN URINE INCON ASSESS: CPT | Performed by: FAMILY MEDICINE

## 2021-03-17 PROCEDURE — G8427 DOCREV CUR MEDS BY ELIG CLIN: HCPCS | Performed by: FAMILY MEDICINE

## 2021-03-17 PROCEDURE — 1123F ACP DISCUSS/DSCN MKR DOCD: CPT | Performed by: FAMILY MEDICINE

## 2021-03-17 PROCEDURE — G8484 FLU IMMUNIZE NO ADMIN: HCPCS | Performed by: FAMILY MEDICINE

## 2021-03-17 PROCEDURE — G8420 CALC BMI NORM PARAMETERS: HCPCS | Performed by: FAMILY MEDICINE

## 2021-03-17 PROCEDURE — 4040F PNEUMOC VAC/ADMIN/RCVD: CPT | Performed by: FAMILY MEDICINE

## 2021-03-17 PROCEDURE — 1036F TOBACCO NON-USER: CPT | Performed by: FAMILY MEDICINE

## 2021-03-17 PROCEDURE — 99213 OFFICE O/P EST LOW 20 MIN: CPT | Performed by: FAMILY MEDICINE

## 2021-03-17 ASSESSMENT — ENCOUNTER SYMPTOMS
ABDOMINAL PAIN: 0
WHEEZING: 0
SHORTNESS OF BREATH: 0
COUGH: 0

## 2021-03-17 NOTE — PROGRESS NOTES
[address] [facility]  [phone]   Becky Savage MD     Patient: Ekta Parker  YOB: 1929  Visit Date: 3/17/21    Fredy Ribera is a 80y.o. year old female here today for   Chief Complaint   Patient presents with    Fatigue     6 weeks follow up       HPI  Patient is a 80year old female here for follow up of fatigue and fall. Has weaned off of the prozac. Not noticed a big difference. Has increased fatigue and sleeping more. Feeling more wobbly but has not had any further falls. Started getting some heart palpitations in the morning. Goes away on its own. Not associated with chest pain or pressure , shortness of breath, N/V, diaphoresis. Has some urinary hesitancy . Denies dysuria. No urgency or frequency. No hematuria. No fevers or chills. Two weeks ago had an episode of dark black diarrhea - went away on its own. Review of Systems   Constitutional: Negative for chills and fever. Respiratory: Negative for cough, shortness of breath and wheezing. Cardiovascular: Negative for chest pain, palpitations and leg swelling. Gastrointestinal: Negative for abdominal pain. Neurological: Negative for dizziness and light-headedness.        Current Outpatient Medications on File Prior to Visit   Medication Sig Dispense Refill    memantine (NAMENDA) 5 MG tablet TAKE ONE TABLET BY MOUTH TWO TIMES A  tablet 3    alendronate (FOSAMAX) 70 MG tablet Take 1 tablet by mouth every 7 days 12 tablet 2    meloxicam (MOBIC) 7.5 MG tablet TAKE ONE TABLET BY MOUTH DAILY 90 tablet 0    Umeclidinium Bromide (INCRUSE ELLIPTA) 62.5 MCG/INH AEPB Inhale 1 puff into the lungs daily 3 each 2    Handicap Placard MISC by Does not apply route Unable to ambulate more than 40 feet without difficulty  Expires 10/14/2025 1 each 0    Azelastine-Fluticasone (DYMISTA) 137-50 MCG/ACT SUSP by Nasal route      omeprazole (PRILOSEC) 20 MG delayed release capsule Take 20 mg by mouth daily      meclizine (ANTIVERT) 12.5 MG tablet Take 12.5 mg by mouth 3 times daily as needed      magnesium oxide (MAG-OX) 400 MG tablet Take 400 mg by mouth daily      Cyanocobalamin (VITAMIN B 12 PO) Take by mouth      vitamin D (CHOLECALCIFEROL) 1000 UNIT TABS tablet Take 1,000 Units by mouth 2 times daily       Biotin w/ Vitamins C & E (HAIR/SKIN/NAILS PO) Take by mouth      Multiple Vitamins-Minerals (MULTIVITAMIN ADULT PO) Take by mouth       No current facility-administered medications on file prior to visit. Allergies   Allergen Reactions    Demerol Hcl [Meperidine]      Other reaction(s): Intolerance  \"went into shok\"    Statins Other (See Comments)       Past medical, surgical, socialand/or family history reviewed, updated as needed, and are non-contributory (unless otherwise stated). Medications, allergies, and problem list also reviewed and updated as needed in patient's record. Wt Readings from Last 3 Encounters:   03/17/21 122 lb (55.3 kg)   12/09/20 125 lb (56.7 kg)   10/16/20 130 lb (59 kg)                   /74   Pulse 74   Temp 96.4 °F (35.8 °C)   Resp 20   Ht 5' 6\" (1.676 m)   Wt 122 lb (55.3 kg)   SpO2 96%   BMI 19.69 kg/m²        Physical Exam  Vitals signs and nursing note reviewed. Constitutional:       Appearance: She is well-developed. Cardiovascular:      Rate and Rhythm: Normal rate and regular rhythm. Heart sounds: Normal heart sounds. No murmur. No friction rub. Pulmonary:      Effort: Pulmonary effort is normal. No respiratory distress. Breath sounds: Normal breath sounds. No stridor. No wheezing or rales. Abdominal:      General: Bowel sounds are normal. There is no distension. Palpations: Abdomen is soft. There is no mass. Tenderness: There is no abdominal tenderness. There is no guarding or rebound. Musculoskeletal: Normal range of motion. Right lower leg: No edema.       Left lower leg: No edema. Results for orders placed or performed in visit on 02/05/21   TSH   Result Value Ref Range    TSH 2.410 0.270 - 4.200 uIU/mL   COMPREHENSIVE METABOLIC PANEL   Result Value Ref Range    Sodium 135 132 - 146 mmol/L    Potassium 4.0 3.5 - 5.0 mmol/L    Chloride 95 (L) 98 - 107 mmol/L    CO2 22 22 - 29 mmol/L    Anion Gap 18 (H) 7 - 16 mmol/L    Glucose 103 (H) 74 - 99 mg/dL    BUN 10 8 - 23 mg/dL    CREATININE 0.6 0.5 - 1.0 mg/dL    GFR Non-African American >60 >=60 mL/min/1.73    GFR African American >60     Calcium 9.8 8.6 - 10.2 mg/dL    Total Protein 7.0 6.4 - 8.3 g/dL    Albumin 4.7 3.5 - 5.2 g/dL    Total Bilirubin 0.5 0.0 - 1.2 mg/dL    Alkaline Phosphatase 68 35 - 104 U/L    ALT 13 0 - 32 U/L    AST 17 0 - 31 U/L   Vitamin B12 & Folate   Result Value Ref Range    Vitamin B-12 857 211 - 946 pg/mL    Folate 19.9 4.8 - 24.2 ng/mL   CBC   Result Value Ref Range    WBC 5.3 4.5 - 11.5 E9/L    RBC 4.75 3.50 - 5.50 E12/L    Hemoglobin 14.6 11.5 - 15.5 g/dL    Hematocrit 44.4 34.0 - 48.0 %    MCV 93.5 80.0 - 99.9 fL    MCH 30.7 26.0 - 35.0 pg    MCHC 32.9 32.0 - 34.5 %    RDW 12.7 11.5 - 15.0 fL    Platelets 013 419 - 232 E9/L    MPV 11.1 7.0 - 12.0 fL       ASSESSMENT/PLAN  Emilee was seen today for fatigue. Diagnoses and all orders for this visit:    Black stool  -     CBC; Future    Chronic fatigue  -     CBC; Future    Urinary hesitancy  -     URINALYSIS; Future  -     Culture, Urine; Future      Will stop singulair and work on discontinuing medications that are not needed to see if fatigue improves       Phone/MyChart follow up if tests abnormal.    Return in about 1 month (around 4/17/2021). or sooner if necessary. I have reviewed myfindings and recommendations with Emilee. Juancho Rowe.  Jose Beach M.D

## 2021-03-19 LAB — URINE CULTURE, ROUTINE: NORMAL

## 2021-05-09 ENCOUNTER — PATIENT MESSAGE (OUTPATIENT)
Dept: FAMILY MEDICINE CLINIC | Age: 86
End: 2021-05-09

## 2021-05-09 DIAGNOSIS — M81.0 AGE-RELATED OSTEOPOROSIS WITHOUT CURRENT PATHOLOGICAL FRACTURE: ICD-10-CM

## 2021-05-10 RX ORDER — MELOXICAM 7.5 MG/1
TABLET ORAL
Qty: 90 TABLET | Refills: 0 | OUTPATIENT
Start: 2021-05-10

## 2021-05-10 RX ORDER — ALENDRONATE SODIUM 70 MG/1
70 TABLET ORAL
Qty: 12 TABLET | Refills: 2 | Status: SHIPPED
Start: 2021-05-10 | End: 2021-07-19 | Stop reason: SDUPTHER

## 2021-05-10 NOTE — TELEPHONE ENCOUNTER
From: Lv Vizcaino  To: Danii Harvey.  Nancy Dumont MD  Sent: 5/9/2021 8:06 PM EDT  Subject: Prescription Question    Good Day Dr Nancy Dumont   This is Kaley Arias (Emilee daughter) we have noticed continuing ST memory loss and was wondering if we can re-start the donepezil   To help slow the progression   She is no longer on the heart medicine [facility] that was the concern of interaction  Appreciate any input you could give thank you for your time and consideration  Hope all is going well in your world

## 2021-05-11 RX ORDER — DONEPEZIL HYDROCHLORIDE 5 MG/1
5 TABLET, FILM COATED ORAL NIGHTLY
Qty: 30 TABLET | Refills: 3 | Status: SHIPPED
Start: 2021-05-11 | End: 2021-07-19 | Stop reason: SDUPTHER

## 2021-05-11 RX ORDER — MELOXICAM 7.5 MG/1
TABLET ORAL
Qty: 90 TABLET | Refills: 0 | Status: SHIPPED
Start: 2021-05-11 | End: 2021-11-17 | Stop reason: CLARIF

## 2021-05-28 ENCOUNTER — TELEPHONE (OUTPATIENT)
Dept: FAMILY MEDICINE CLINIC | Age: 86
End: 2021-05-28

## 2021-05-28 ENCOUNTER — PATIENT MESSAGE (OUTPATIENT)
Dept: FAMILY MEDICINE CLINIC | Age: 86
End: 2021-05-28

## 2021-05-28 DIAGNOSIS — I47.1 SVT (SUPRAVENTRICULAR TACHYCARDIA) (HCC): Primary | ICD-10-CM

## 2021-05-28 DIAGNOSIS — I49.3 VENTRICULAR ECTOPY: ICD-10-CM

## 2021-05-28 NOTE — TELEPHONE ENCOUNTER
Patient daughter left voicemail stating patient had episode of A fib this morning and is the second time it has happened. Heart rate and BP are coming down, has aid with her now and Nurse comes this afternoon. Patient is refusing to be transported to hospital. It is coming down and patient is coherent and oriented, no signs of stroke.

## 2021-06-01 NOTE — TELEPHONE ENCOUNTER
From: Arturo Woods  To: Mary Redd.  Melquiades Hammer MD  Sent: 5/28/2021 2:10 PM EDT  Subject: Non-Urgent Medical Question    Per our conversation here is the picture for the home system EKGs that were done this morning with [place]   She is resting and doing better at this time

## 2021-06-04 ENCOUNTER — TELEPHONE (OUTPATIENT)
Dept: NON INVASIVE DIAGNOSTICS | Age: 86
End: 2021-06-04

## 2021-06-07 ENCOUNTER — TELEPHONE (OUTPATIENT)
Dept: ADMINISTRATIVE | Age: 86
End: 2021-06-07

## 2021-06-07 NOTE — TELEPHONE ENCOUNTER
Daughter returning call and NP scheduled with Dr. Chase Reyes on: 6/14/21. She is a current Dr. Kaleb Montes pt - and was seen in EP [facility] ([facility] Dr. Danii Arnold 2016 with an echo at that time. No other recs available - in Epic.

## 2021-06-08 RX ORDER — SERTRALINE HYDROCHLORIDE 25 MG/1
25 TABLET, FILM COATED ORAL DAILY
Qty: 30 TABLET | Refills: 5 | Status: SHIPPED
Start: 2021-06-08 | End: 2021-07-19 | Stop reason: SDUPTHER

## 2021-06-08 NOTE — TELEPHONE ENCOUNTER
Called and spoke with the patient daughter Radha Rossi and scheduled the patient on 6-17-21 for Right SI joint injection. Last injection was last June.

## 2021-06-14 ENCOUNTER — OFFICE VISIT (OUTPATIENT)
Dept: NON INVASIVE DIAGNOSTICS | Age: 86
End: 2021-06-14
Payer: MEDICARE

## 2021-06-14 VITALS
RESPIRATION RATE: 16 BRPM | SYSTOLIC BLOOD PRESSURE: 132 MMHG | WEIGHT: 128 LBS | BODY MASS INDEX: 20.57 KG/M2 | HEART RATE: 52 BPM | HEIGHT: 66 IN | OXYGEN SATURATION: 97 % | DIASTOLIC BLOOD PRESSURE: 72 MMHG

## 2021-06-14 DIAGNOSIS — I47.29 NSVT (NONSUSTAINED VENTRICULAR TACHYCARDIA): ICD-10-CM

## 2021-06-14 DIAGNOSIS — R00.2 HEART PALPITATIONS: ICD-10-CM

## 2021-06-14 DIAGNOSIS — I47.1 SVT (SUPRAVENTRICULAR TACHYCARDIA) (HCC): Primary | ICD-10-CM

## 2021-06-14 PROBLEM — R29.890 HEIGHT LOSS: Status: ACTIVE | Noted: 2017-01-19

## 2021-06-14 PROBLEM — R63.4 ABNORMAL WEIGHT LOSS: Status: ACTIVE | Noted: 2017-01-19

## 2021-06-14 PROCEDURE — 1090F PRES/ABSN URINE INCON ASSESS: CPT | Performed by: INTERNAL MEDICINE

## 2021-06-14 PROCEDURE — 1123F ACP DISCUSS/DSCN MKR DOCD: CPT | Performed by: INTERNAL MEDICINE

## 2021-06-14 PROCEDURE — G8420 CALC BMI NORM PARAMETERS: HCPCS | Performed by: INTERNAL MEDICINE

## 2021-06-14 PROCEDURE — 99204 OFFICE O/P NEW MOD 45 MIN: CPT | Performed by: INTERNAL MEDICINE

## 2021-06-14 PROCEDURE — 1036F TOBACCO NON-USER: CPT | Performed by: INTERNAL MEDICINE

## 2021-06-14 PROCEDURE — G8427 DOCREV CUR MEDS BY ELIG CLIN: HCPCS | Performed by: INTERNAL MEDICINE

## 2021-06-14 PROCEDURE — 4040F PNEUMOC VAC/ADMIN/RCVD: CPT | Performed by: INTERNAL MEDICINE

## 2021-06-14 PROCEDURE — 93000 ELECTROCARDIOGRAM COMPLETE: CPT | Performed by: INTERNAL MEDICINE

## 2021-06-14 RX ORDER — LANOLIN ALCOHOL/MO/W.PET/CERES
50 CREAM (GRAM) TOPICAL DAILY
COMMUNITY

## 2021-06-14 RX ORDER — FLUTICASONE PROPIONATE 50 MCG
1 SPRAY, SUSPENSION (ML) NASAL DAILY
COMMUNITY
End: 2021-12-01

## 2021-06-14 RX ORDER — MONTELUKAST SODIUM 10 MG/1
10 TABLET ORAL NIGHTLY
COMMUNITY
End: 2021-12-01

## 2021-06-14 ASSESSMENT — ENCOUNTER SYMPTOMS
CHEST TIGHTNESS: 0
EYE REDNESS: 0
SHORTNESS OF BREATH: 0
ABDOMINAL PAIN: 0
ABDOMINAL DISTENTION: 0
DIARRHEA: 0
NAUSEA: 0
WHEEZING: 0
COUGH: 0
COLOR CHANGE: 0
SINUS PRESSURE: 0

## 2021-06-14 NOTE — PROGRESS NOTES
Cardiac Electrophysiology Outpatient Progress Note    Mona Simeon  7/20/1929  Date of Service: 6/14/2021  Referring Provider/PCP: Reji Beltran. Luisana Grey MD  Chief Complaint:   Chief Complaint   Patient presents with    Tachycardia     New Patient, SVT, palpitations, sob, dizzy, lightheaded,          HISTORY OF PRESENT ILLNESS    Mona Simeon presents to the office today for the management of these Electrophysiology conditions: SVT    She has history of depression, PVC treated with Flecainide- did not tolerate it due to bradycardia. Per cardiology, Ischemic w/u not appropriate at her age and mental staus. She presents in [place], 52 today. MCOT 8/31/20 showed 11 beat NSVT, NSR underlying, 24 secs SVT, multiple episodes of nonsustained SVT and NSVT    She has noticed recently when she lying down, she would feel palpitations, heart racing briefly. This happens about once every week or two. She is a difficult historian. Metoprolol last year made her HR too slow and it was stopped.  She has been to [facility] in the past and was intolerant to flecainide due to bradycardia    Patient Active Problem List    Diagnosis Date Noted    Hypertension     [facility]ra[facility]) [facility]     Hyperlipidemia     Memory loss 01/19/2017     Overview Note:     Overview:   MOCA (1/2017) 25  MMSE (1/2017) 27    Last Assessment & Plan:   Suspect that complaints are mostly related to depression, though cannot completely exclude possibility of an early dementia syndrome  - will address depression  - monitor functional status  - check B12 and CT of the brain  - check MOCA again in 6 months  - no indication for cognitive enhancers at this time      Moderate single current episode of major depressive disorder ([facility] [address] 01/19/2017     Overview Note:     Last Assessment & Plan:   Significant depression symptoms: feels depressed, low energy, poor apettite, poor sleep, problems with concentration, poor motivation, loss of interests  - increased sertraline to 100 mg  - monitor for tolerability and efficacy  - can consider mirtazapine, venlafaxine in the future if response inadequate  - significant caregiver stress noted - advised her to block off time for her own physical and mental wellbeing  - advised her to start taking a daily walking  - discussed role of psychotherapy in her case - would benefit from counseling, coping strategies; will consider in the future      Abnormal weight loss 01/19/2017     Overview Note:     Last Assessment & Plan:   Formatting of this note might be different from the original.  recently stable - ?related to depression  - advocated for weight monitoring and stabilization  - re-evaluate at next visit      Height loss 01/19/2017     Overview Note:     Last Assessment & Plan:   Formatting of this note might be different from the original.  Reported; has been treated with bisphosphonates previously - suspect that she does have osteoporosis  - consider DEXA at next visit  - 25-OH vitamin D level to be checked today  - continue vitamin D supplementation      Ventricular ectopy 10/31/2016     Overview Note:     A. [facility] workup 2016 with minimal CAD on cath and normal echo. Holter isolated ectopy. Flecainide trial resulted in bradycardia  B. 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) for 24 seconds      Posterior vitreous detachment 02/11/2015    Senile nuclear sclerosis 02/11/2015    Chronic airway obstruction ([address] 10/20/2011     Overview Note:     Last Assessment & Plan:   Formatting of this note might be different from the original.  Assessment: patient stable on elipta inhaler         History reviewed. No pertinent family history.       Past Surgical History:   Procedure Laterality Date    CATARACT REMOVAL WITH IMPLANT      right eye    HYSTERECTOMY         Current Outpatient Medications   Medication Sig Dispense Refill    fluticasone (FLONASE) 50 MCG/ACT nasal spray 1 spray by Each Nostril route daily      montelukast (SINGULAIR) 10 MG tablet Take 10 mg by mouth nightly      vitamin B-6 (PYRIDOXINE) 50 MG tablet Take 50 mg by mouth daily      sertraline (ZOLOFT) 25 MG tablet Take 1 tablet by mouth daily 30 tablet 5    donepezil (ARICEPT) 5 MG tablet Take 1 tablet by mouth nightly 30 tablet 3    alendronate (FOSAMAX) 70 MG tablet Take 1 tablet by mouth every 7 days 12 tablet 2    memantine (NAMENDA) 5 MG tablet TAKE ONE TABLET BY MOUTH TWO TIMES A  tablet 3    Umeclidinium Bromide (INCRUSE ELLIPTA) 62.5 MCG/INH AEPB Inhale 1 puff into the lungs daily 3 each 2    Handicap Placard MISC by Does not apply route Unable to ambulate more than 40 feet without difficulty  Expires 10/14/2025 1 each 0    Azelastine-Fluticasone (DYMISTA) 137-50 MCG/ACT SUSP by Nasal route      omeprazole (PRILOSEC) 20 MG delayed release capsule Take 20 mg by mouth daily      meclizine (ANTIVERT) 12.5 MG tablet Take 12.5 mg by mouth 3 times daily as needed      magnesium oxide (MAG-OX) 400 MG tablet Take 400 mg by mouth daily      Cyanocobalamin (VITAMIN B 12 PO) Take by mouth      vitamin D (CHOLECALCIFEROL) 1000 UNIT TABS tablet Take 1,000 Units by mouth 2 times daily       Biotin w/ Vitamins C & E (HAIR/SKIN/NAILS PO) Take by mouth      Multiple Vitamins-Minerals (MULTIVITAMIN ADULT PO) Take by mouth      meloxicam (MOBIC) 7.5 MG tablet TAKE ONE TABLET BY MOUTH DAILY (Patient not taking: Reported on 6/14/2021) 90 tablet 0     No current facility-administered medications for this visit. Allergies   Allergen Reactions    Demerol Hcl [Meperidine]      Other reaction(s): Intolerance  \"went into shok\"    Statins Other (See Comments)           ROS:   Review of Systems   Constitutional: Positive for activity change and fatigue. Negative for fever. HENT: Negative for congestion, nosebleeds and sinus pressure. Eyes: Negative for redness and visual disturbance.    Respiratory: Negative for cough, chest tightness, shortness of breath and wheezing. Cardiovascular: Negative for chest pain, palpitations and leg swelling. Gastrointestinal: Negative for abdominal distention, abdominal pain, diarrhea and nausea. Endocrine: Negative for cold intolerance, heat intolerance, polydipsia and polyphagia. Genitourinary: Negative for difficulty urinating, frequency and urgency. Musculoskeletal: Negative for arthralgias, joint swelling and myalgias. Skin: Negative for color change and wound. Neurological: Negative for dizziness, syncope, weakness and numbness. Psychiatric/Behavioral: Negative for agitation, behavioral problems, confusion, decreased concentration, hallucinations and suicidal ideas. The patient is not nervous/anxious. PHYSICAL EXAM:  Vitals:    06/14/21 1305   BP: 132/72   Site: Left Upper Arm   Position: Sitting   Cuff Size: Medium Adult   Pulse: 52   Resp: 16   SpO2: 97%   Weight: 128 lb (58.1 kg)   Height: 5' 6\" (1.676 m)     Physical Exam  Vitals reviewed. Constitutional:       Appearance: Normal appearance. HENT:      Head: Normocephalic. Mouth/Throat:      Mouth: Mucous membranes are moist.      Pharynx: Oropharynx is clear. Eyes:      Conjunctiva/sclera: Conjunctivae normal.   Neck:      Vascular: No carotid bruit. Cardiovascular:      Rate and Rhythm: Normal rate and regular rhythm. Pulses: Normal pulses. Heart sounds: Normal heart sounds. Pulmonary:      Effort: Pulmonary effort is normal.      Breath sounds: Normal breath sounds. No rales. Chest:      Chest wall: No tenderness. Abdominal:      General: Bowel sounds are normal.      Palpations: Abdomen is soft. Musculoskeletal:         General: Normal range of motion. Cervical back: Normal range of motion and neck supple. Skin:     General: Skin is warm. Neurological:      General: No focal deficit present.       Mental Status: She is alert and oriented to person, place, and time.   Psychiatric:         Mood and Affect: Mood normal.         Behavior: Behavior normal.         Thought Content: Thought content normal.          Pertinent Labs:  CBC:   WBC (E9/L)   Date Value   03/17/2021 7.8   02/05/2021 5.3   07/29/2020 5.8     Hemoglobin (g/dL)   Date Value   03/17/2021 14.9   02/05/2021 14.6   07/29/2020 14.2     Hematocrit (%)   Date Value   03/17/2021 47.0   02/05/2021 44.4   07/29/2020 43.2     Platelets (C3/A)   Date Value   03/17/2021 204   02/05/2021 195   07/29/2020 193      BMP:   Sodium (mmol/L)   Date Value   02/05/2021 135   07/29/2020 140   06/01/2020 141     Potassium (mmol/L)   Date Value   02/05/2021 4.0   07/29/2020 4.0   06/01/2020 4.0     Magnesium (mg/dL)   Date Value   07/29/2020 2.2   12/13/2019 2.3   01/20/2017 2.1     Chloride (mmol/L)   Date Value   02/05/2021 95 (L)   07/29/2020 101   06/01/2020 101     CO2 (mmol/L)   Date Value   02/05/2021 22   07/29/2020 27   06/01/2020 26     BUN (mg/dL)   Date Value   02/05/2021 10   07/29/2020 11   06/01/2020 16     CREATININE (mg/dL)   Date Value   02/05/2021 0.6   07/29/2020 0.6   06/01/2020 0.6     Glucose (mg/dL)   Date Value   02/05/2021 103 (H)   07/29/2020 83   06/01/2020 102 (H)     Calcium (mg/dL)   Date Value   02/05/2021 9.8   07/29/2020 9.5   06/01/2020 9.7      INR:   INR (no units)   Date Value   07/29/2020 1.1   12/14/2019 1.0      BNP: No results found for: BNP   TSH:   TSH (uIU/mL)   Date Value   02/05/2021 2.410   08/28/2020 2.400   12/13/2019 1.800      Cardiac Injury Profile:   Troponin (ng/mL)   Date Value   07/29/2020 <0.01     Lipid Profile: No results found for: TRIG, HDL, LDLCALC, CHOL   Hemoglobin A1C: No results found for: LABA1C        Pertinent Cardiac Testing:       ECG 6/14/2021: normal sinus rhythm    I have independently reviewed all of the ECGs and rhythm strips per above    I have personally reviewed the laboratory, cardiac diagnostic and radiographic testing as outlined above:     We have requested previous records. 1. SVT (supraventricular tachycardia) ([address]    2. NSVT (nonsustained ventricular tachycardia) ([facility])    3. Heart palpitations         ASSESSMENT & PLAN    1. Palpitation  - Episodes of NSVT, SVT nonsustained noted- minimally symptomatic  - No significant bradycardia seen on 30-day event monitor necessitating pacemaker placement   -Use of AV kath agents or antiarrhythmics will result in significant bradycardia as demonstrated by previous use of metoprolol and flecainide in the past.  I would avoid using any of this without pacemaker backup. -Given that her main complaints are nonsustained palpitations once a week or every 2 weeks, I would rather monitor at this time. Continue magnesium supplementation. If her episodes were to get more significant, we can consider pacemaker placement with initiation of antiarrhythmics. She is not too inclined with this approach which is also reasonable given her advanced age. She complains of fatigue which I believe is more related to her psychotropic medications on board. Recommend consolidating and using dose of the only absolutely necessary medications. 2. Fatigue  - No significant bradycardia on event monitor last year. - Suspect this is more likely due to her psychotropic medications      Plan :  1. Monitor asymptomatic SVT/NSVT  2. Consolidate psychotropic meds  3. Re-eval in 3 months    Thank you for allowing me to participate in your patient's care. I have spent a total of 40 minutes with the patient and his/her family reviewing the above stated recommendations. A total of >50% of that time involved face-to-face time providing counseling and or coordination of care with the other providers.     Gifty Forde MD  Cardiac Electrophysiology  [address] [place] [address]

## 2021-06-17 ENCOUNTER — OFFICE VISIT (OUTPATIENT)
Dept: PHYSICAL MEDICINE AND REHAB | Age: 86
End: 2021-06-17
Payer: MEDICARE

## 2021-06-17 VITALS
SYSTOLIC BLOOD PRESSURE: 156 MMHG | HEART RATE: 60 BPM | BODY MASS INDEX: 21.16 KG/M2 | DIASTOLIC BLOOD PRESSURE: 81 MMHG | WEIGHT: 127 LBS | HEIGHT: 65 IN

## 2021-06-17 DIAGNOSIS — M53.3 SACROILIAC DYSFUNCTION: ICD-10-CM

## 2021-06-17 DIAGNOSIS — M53.3 SACROILIAC DYSFUNCTION: Primary | ICD-10-CM

## 2021-06-17 PROCEDURE — 76942 ECHO GUIDE FOR BIOPSY: CPT | Performed by: PHYSICAL MEDICINE & REHABILITATION

## 2021-06-17 PROCEDURE — 20553 NJX 1/MLT TRIGGER POINTS 3/>: CPT | Performed by: PHYSICAL MEDICINE & REHABILITATION

## 2021-06-17 RX ORDER — TRIAMCINOLONE ACETONIDE 40 MG/ML
40 INJECTION, SUSPENSION INTRA-ARTICULAR; INTRAMUSCULAR ONCE
Status: COMPLETED | OUTPATIENT
Start: 2021-06-17 | End: 2021-06-17

## 2021-06-17 RX ORDER — LIDOCAINE HYDROCHLORIDE 10 MG/ML
2 INJECTION, SOLUTION INFILTRATION; PERINEURAL ONCE
Status: COMPLETED | OUTPATIENT
Start: 2021-06-17 | End: 2021-06-17

## 2021-06-17 RX ADMIN — LIDOCAINE HYDROCHLORIDE 2 ML: 10 INJECTION, SOLUTION INFILTRATION; PERINEURAL at 10:37

## 2021-06-17 RX ADMIN — TRIAMCINOLONE ACETONIDE 40 MG: 40 INJECTION, SUSPENSION INTRA-ARTICULAR; INTRAMUSCULAR at 10:37

## 2021-06-17 NOTE — PROGRESS NOTES
///Noreen Jaeger D.O. [facility]  [address] [facility] [facility] [place]  Phone: [phone]  Fax: [phone]    6/17/2021    Chief Complaint   Patient presents with    Hip Pain     right si joint injection       Last injection:6/1/20  Taking anticoagulants/antiplatelets: No  Diabetic: No  Febrile/active infection: No    After explaining the indications, risks, benefits and alternatives of a right sacroiliac joint injection, the patient agreed to proceed. A permit was signed and scanned into the chart. The patient was placed in the prone position and draped for modesty. The skin on the Right upper buttock was prepared with Chloraprep. Using aseptic no touch technique, a 22 gauge, 3\" needle with 1 cc of Kenalog 40mg/cc and 1 cc of 1% lidocaine was directed to the joint using ultrasound guidance. After negative aspiration, the medication was injected. Adequate hemostasis was achieved and a bandage applied. The patient tolerated the procedure well and was educated in post injection care. The patient was clinically monitored after the injection and left the office without incident. There was post injection reduction in pain. Ultrasound images are scanned in the electronic medical record separately. Charanjit Sheehan D.O., P.T.   Board Certified Physical Medicine and Rehabilitation  Board Certified Electrodiagnostic Medicine    Administrations This Visit     lidocaine 1 % injection 2 mL     Admin Date  06/17/2021  10:37 Action  Given Dose  2 mL Route  Other Site   Administered By  Bi Marshall MA    Ordering Provider: Margurite Harada, DO    NDC: 4390-4126-15    Lot#: [id number]    : [facility]    Patient Supplied?: No          triamcinolone acetonide (KENALOG-40) injection 40 mg     Admin Date  06/17/2021  10:37 Action  Given Dose  40 mg Route  Intra-articular Site   Administered By  Bi Marshall MA    Ordering Provider: Margurite Harada, DO NDC: 7780-7805-26    Lot#: [id number]    : [employer] ([facility])    Patient Supplied?: No

## 2021-06-28 ENCOUNTER — CARE COORDINATION (OUTPATIENT)
Dept: CARE COORDINATION | Age: 86
End: 2021-06-28

## 2021-06-28 SDOH — ECONOMIC STABILITY: HOUSING INSECURITY: IN THE LAST 12 MONTHS, HOW MANY PLACES HAVE YOU LIVED?: 1

## 2021-06-28 SDOH — HEALTH STABILITY: PHYSICAL HEALTH: ON AVERAGE, HOW MANY DAYS PER WEEK DO YOU ENGAGE IN MODERATE TO STRENUOUS EXERCISE (LIKE A BRISK WALK)?: 0 DAYS

## 2021-06-28 SDOH — ECONOMIC STABILITY: INCOME INSECURITY: IN THE LAST 12 MONTHS, WAS THERE A TIME WHEN YOU WERE NOT ABLE TO PAY THE MORTGAGE OR RENT ON TIME?: NO

## 2021-06-28 SDOH — ECONOMIC STABILITY: TRANSPORTATION INSECURITY
IN THE PAST 12 MONTHS, HAS THE LACK OF TRANSPORTATION KEPT YOU FROM MEDICAL APPOINTMENTS OR FROM GETTING MEDICATIONS?: NO

## 2021-06-28 SDOH — HEALTH STABILITY: PHYSICAL HEALTH: ON AVERAGE, HOW MANY MINUTES DO YOU ENGAGE IN EXERCISE AT THIS LEVEL?: 0 MIN

## 2021-06-28 SDOH — ECONOMIC STABILITY: FOOD INSECURITY: WITHIN THE PAST 12 MONTHS, YOU WORRIED THAT YOUR FOOD WOULD RUN OUT BEFORE YOU GOT MONEY TO BUY MORE.: NEVER TRUE

## 2021-06-28 SDOH — ECONOMIC STABILITY: FOOD INSECURITY: WITHIN THE PAST 12 MONTHS, THE FOOD YOU BOUGHT JUST DIDN'T LAST AND YOU DIDN'T HAVE MONEY TO GET MORE.: NEVER TRUE

## 2021-06-28 SDOH — ECONOMIC STABILITY: TRANSPORTATION INSECURITY
IN THE PAST 12 MONTHS, HAS LACK OF TRANSPORTATION KEPT YOU FROM MEETINGS, WORK, OR FROM GETTING THINGS NEEDED FOR DAILY LIVING?: NO

## 2021-06-28 SDOH — ECONOMIC STABILITY: HOUSING INSECURITY
IN THE LAST 12 MONTHS, WAS THERE A TIME WHEN YOU DID NOT HAVE A STEADY PLACE TO SLEEP OR SLEPT IN A SHELTER (INCLUDING NOW)?: NO

## 2021-06-28 ASSESSMENT — LIFESTYLE VARIABLES
HOW OFTEN DO YOU HAVE A DRINK CONTAINING ALCOHOL: NEVER
HOW MANY STANDARD DRINKS CONTAINING ALCOHOL DO YOU HAVE ON A TYPICAL DAY: 1 OR 2

## 2021-06-28 ASSESSMENT — SOCIAL DETERMINANTS OF HEALTH (SDOH)
HOW OFTEN DO YOU ATTEND CHURCH OR RELIGIOUS SERVICES?: NEVER
DO YOU BELONG TO ANY CLUBS OR ORGANIZATIONS SUCH AS CHURCH GROUPS UNIONS, FRATERNAL OR ATHLETIC GROUPS, OR SCHOOL GROUPS?: NO
HOW OFTEN DO YOU ATTENT MEETINGS OF THE CLUB OR ORGANIZATION YOU BELONG TO?: NEVER
HOW HARD IS IT FOR YOU TO PAY FOR THE VERY BASICS LIKE FOOD, HOUSING, MEDICAL CARE, AND HEATING?: NOT HARD AT ALL
IN A TYPICAL WEEK, HOW MANY TIMES DO YOU TALK ON THE PHONE WITH FAMILY, FRIENDS, OR NEIGHBORS?: MORE THAN THREE TIMES A WEEK
HOW OFTEN DO YOU GET TOGETHER WITH FRIENDS OR RELATIVES?: MORE THAN THREE TIMES A WEEK

## 2021-06-28 ASSESSMENT — ENCOUNTER SYMPTOMS: DYSPNEA ASSOCIATED WITH: EXERTION

## 2021-06-28 NOTE — LETTER
6/28/2021    [address] [place] [address] [place] [address]    Dear Valery Beckford,    I enjoyed speaking with you and wanted to send some additional information. Alexa Shi MD and I will work together to ensure your needs are met and help you achieve your health goals. We are committed to walk with you on this journey and look forward to working with you. Please feel free to contact me with any questions or concerns.   I am available by phone     In good health,     Fabian Murray MSN, RN, [id number] [place] [address]  Cell: [phone]

## 2021-06-28 NOTE — CARE COORDINATION
Ambulatory Care Coordination Note  6/28/2021  CM Risk Score: 2  Charlson 10 Year Mortality Risk Score: 98%     ACC: Kary Lemon, RN    Summary Note: Explained the Care Coordination Program to patient. Verbalized understanding and is agreeable to service. Spoke with pt's daughter, Constantine Lopez, as . (On [facility] form)  States pt had injection by Dr Kaya Ordaz on 6/17/21 for her Right SI Joint pain. [facility] pt said it has not helped at all. Suggested letting Dr Kaya Ordaz know. Constantine Lopez would like to know if the PCP feels pt would benefit from a 'Kidney function test' since pain is in the flank area. Pt does not have an appt scheduled with PCP. Suggested Constantine Lopez call to get an appt scheduled. She agrees. Denies s/s of COPD Exacerbation. Discussed the Zone Tool and verbalizes understanding ACM Will send info via [employer]  Has not used rescue inhaler in a long time. Declines referral to Dietician at this time, stating her mom is doing well with PO intake. Constantine Lopez puts meds in pill planner. Is not interested in pill packs at this time. She doesn't want to change her mom's routine at this time. She also feels she knows what all meds are for and the best way to take. Declines referral to [facility] Pharmacist at this time. [facility] house was well equipped from pt's late , and is handicap accessible. Will let ACM know if there are any further needs that would require a SW referral, but declines at this time.     PLAN:      Patient:    Follow COPD Zone Tool:  Will send via [employer]   Take medications as prescribed:  Has Caregiver Assistance and Verbalizes Understanding   Avoid triggers of COPD Exacerbations  Has Caregiver Assistance and Verbalizes Understanding   Utilize pursed lip breathing if needed:  Yahoo   Follow up on any health maintenance issues discussed:  Not discussed during this call   Schedule needed appointments:  Has Caregiver Assistance and Jhoan Postin Per the Fall Risk Screening, did the patient have 2 or more falls or 1 fall with injury in the past year?: No     Frequent urination at night?: No  Do you use rails/bars?: Yes  Do you have a non-slip tub mat?: Yes     Are you experiencing loss of meaning?: No  Are you experiencing loss of hope and peace?: No     Thinking about your patient's physical health needs, are there any symptoms or problems (risk indicators) you are unsure about that require further investigation?: Mild vague physical symptoms or problems; but do not impact on daily life or are not of concern to patient   Are the patients physical health problems impacting on their mental well-being?: Mild impact on mental well-being e.g. \"\"feeling fed-up\"\", \"\"reduced enjoyment\"\"   Are there any problems with your patients lifestyle behaviors (alcohol, drugs, diet, exercise) that are impacting on physical or mental well-being?: Some mild concern of potential negative impact on well-being   Do you have any other concerns about your patients mental well-being?  How would you rate their severity and impact on the patient?: Mild problems - don't interfere with function   How would you rate their home environment in terms of safety and stability (including domestic violence, insecure housing, neighbor harassment)?: Safe, stable, but with some inconsistency   How do daily activities impact on the patient's well-being? (include current or anticipated unemployment, work, caregiving, access to transportation or other): Some general dissatisfaction but no concern   How would you rate their social network (family, work, friends)?: Good participation with social networks   How would you rate their financial resources (including ability to afford all required medical care)?: Financially secure, resources adequate, no identified problems   How wells does the patient now understand their health and well-being (symptoms, signs or risk factors) and what they need to do to manage their health?: Reasonable to good understanding and already engages in managing health or is willing to undertake better management   How well do you think your patient can engage in healthcare discussions? (Barriers include language, deafness, aphasia, alcohol or drug problems, learning difficulties, concentration): Clear and open communication, no identified barriers   Do other services need to be involved to help this patient?: Other care/services in place and adequate   Are current services involved with this patient well-coordinated? (Include coordination with other services you are now recommendation): Required care/services in place and adequately coordinated   Suggested Interventions and Community Resources  Fall Risk Prevention: In Process Home Health Services: In Process (Comment: Has private aid M and W)   Meals on Wheels: Declined   Medication Assistance Program: [address] or Pill Pack: Declined   Pharmacist: Declined   Registered Dietician: Declined   Social Work: Declined   Transportation Services: Completed   Zone Management Tools: In Process         Set up/Review Goals, Set up/Review an Education Plan, Schedule an appointment with the patient's PCP              Prior to Admission medications    Medication Sig Start Date End Date Taking?  Authorizing Provider   fluticasone (FLONASE) 50 MCG/ACT nasal spray 1 spray by Each Nostril route daily    Historical Provider, MD   montelukast (SINGULAIR) 10 MG tablet Take 10 mg by mouth nightly    Historical Provider, MD   vitamin B-6 (PYRIDOXINE) 50 MG tablet Take 50 mg by mouth daily    Historical Provider, MD   sertraline (ZOLOFT) 25 MG tablet Take 1 tablet by mouth daily 6/8/21   Arik Toussaint MD   meloxicam (MOBIC) 7.5 MG tablet TAKE ONE TABLET BY MOUTH DAILY 5/11/21   Arik Toussaint MD   donepezil (ARICEPT) 5 MG tablet Take 1 tablet by mouth nightly 5/11/21   Arik Toussaint MD   alendronate (FOSAMAX) 70 MG tablet Take 1 tablet by mouth every 7 days 5/10/21   Antoine Wall MD   memantine (NAMENDA) 5 MG tablet TAKE ONE TABLET BY MOUTH TWO TIMES A DAY 2/3/21   Antoine Wall MD   Umeclidinium Bromide (INCRUSE ELLIPTA) 62.5 MCG/INH AEPB Inhale 1 puff into the lungs daily 10/14/20   Antoine Wall MD   Handicap Placard MISC by Does not apply route Unable to ambulate more than 40 feet without difficulty  Expires 10/14/2025 10/14/20   Antoine Wall MD   Azelastine-Fluticasone (DYMISTA) 137-50 MCG/ACT SUSP by Nasal route    Historical Provider, MD   omeprazole (PRILOSEC) 20 MG delayed release capsule Take 20 mg by mouth daily    Historical Provider, MD   meclizine (ANTIVERT) 12.5 MG tablet Take 12.5 mg by mouth 3 times daily as needed    Historical Provider, MD   magnesium oxide (MAG-OX) 400 MG tablet Take 400 mg by mouth daily    Historical Provider, MD   Cyanocobalamin (VITAMIN B 12 PO) Take by mouth    Historical Provider, MD   vitamin D (CHOLECALCIFEROL) 1000 UNIT TABS tablet Take 1,000 Units by mouth 2 times daily     Historical Provider, MD   Biotin w/ Vitamins C & E (HAIR/SKIN/NAILS PO) Take by mouth    Historical Provider, MD   Multiple Vitamins-Minerals (MULTIVITAMIN ADULT PO) Take by mouth    Historical Provider, MD       No future appointments.    and   COPD Assessment    Does the patient understand envrionmental exposure?: Yes  Is the patient able to verbalize Rescue vs. Long Acting medications?: No  Does the patient have a nebulizer?: No  Does the patient use a space with inhaled medications?: No     No patient-reported symptoms         Symptoms:  None: Yes      Symptom course: stable  Breathlessness: exertion  Increase use of rapid acting/rescue inhaled medications?: No  Change in chronic cough?: No/At Baseline  Change in sputum?: No/At Baseline  Self Monitoring - SaO2: Yes  Baseline SaO2 Readin  Have you had a recent diagnosis of pneumonia either by PCP or at a hospital?: No

## 2021-07-06 ENCOUNTER — CARE COORDINATION (OUTPATIENT)
Dept: CARE COORDINATION | Age: 86
End: 2021-07-06

## 2021-07-06 NOTE — CARE COORDINATION
Attempted to contact pt regarding care coordination left hippa compliant message for dtdeion morales.  With call back info will schedule next out reach for one week

## 2021-07-08 ENCOUNTER — CARE COORDINATION (OUTPATIENT)
Dept: CARE COORDINATION | Age: 86
End: 2021-07-08

## 2021-07-08 NOTE — CARE COORDINATION
Ambulatory Care Coordination Note  7/8/2021  CM Risk Score: 2  Charlson 10 Year Mortality Risk Score: 98%     ACC: Bianca Dougherty LPN    Summary Note: -spoke to dtr andrew and she states her mom is ok  -did have an o2 study done and was reccommended to wear 2l at night and concentrator and supplies were delivered today for pt to start tonight  -short of breath with exertion only  -states back pain is constant and nothing reliefs it. Will mention to pcp at follow up  -pt has fallen a few times this week, bending over with the cat and to water a plant no injuries and pt was able to get self off of the floor each time  -does have a private aide on Monday and Friday to assist with errands and light cleaning   -dtr is to check and review educational material via [employer] as well as schedule a pcp appt   -reviewed and reinforced zone tools and will schedule next out reach for one week         Care Coordination Interventions    Program Enrollment: Complex Care  Referral from Primary Care Provider: No  Suggested Interventions and Community Resources  Fall Risk Prevention: In Process (Comment: Fall Precaution)  Home Health Services: In Process (Comment: Has private aid M and W)  Meals on Wheels: Declined  Medication Assistance Program: [address] or Pill Pack: Declined  Pharmacist: Declined  Registered Dietician: Declined  Social Work: Declined  Transportation Support: Completed  Zone Management Tools: In Process (Comment: COPD)         Goals Addressed    None         Prior to Admission medications    Medication Sig Start Date End Date Taking?  Authorizing Provider   fluticasone (FLONASE) 50 MCG/ACT nasal spray 1 spray by Each Nostril route daily    Historical Provider, MD   montelukast (SINGULAIR) 10 MG tablet Take 10 mg by mouth nightly    Historical Provider, MD   vitamin B-6 (PYRIDOXINE) 50 MG tablet Take 50 mg by mouth daily    Historical Provider, MD   sertraline (ZOLOFT) 25 MG tablet Take 1 tablet by mouth daily 6/8/21   Kartik Murray MD   meloxicam (MOBIC) 7.5 MG tablet TAKE ONE TABLET BY MOUTH DAILY 5/11/21   Kartik Murray MD   donepezil (ARICEPT) 5 MG tablet Take 1 tablet by mouth nightly 5/11/21   Kartik Murray MD   alendronate (FOSAMAX) 70 MG tablet Take 1 tablet by mouth every 7 days 5/10/21   Kartik Murray MD   memantine (NAMENDA) 5 MG tablet TAKE ONE TABLET BY MOUTH TWO TIMES A DAY 2/3/21   Kartik Murray MD   Umeclidinium Bromide (INCRUSE ELLIPTA) 62.5 MCG/INH AEPB Inhale 1 puff into the lungs daily 10/14/20   Kartik Murray MD   Handicap Placard MISC by Does not apply route Unable to ambulate more than 40 feet without difficulty  Expires 10/14/2025 10/14/20   Kartik Murray MD   Azelastine-Fluticasone (DYMISTA) 137-50 MCG/ACT SUSP by Nasal route    Historical Provider, MD   omeprazole (PRILOSEC) 20 MG delayed release capsule Take 20 mg by mouth daily    Historical Provider, MD   meclizine (ANTIVERT) 12.5 MG tablet Take 12.5 mg by mouth 3 times daily as needed    Historical Provider, MD   magnesium oxide (MAG-OX) 400 MG tablet Take 400 mg by mouth daily    Historical Provider, MD   Cyanocobalamin (VITAMIN B 12 PO) Take by mouth    Historical Provider, MD   vitamin D (CHOLECALCIFEROL) 1000 UNIT TABS tablet Take 1,000 Units by mouth 2 times daily     Historical Provider, MD   Biotin w/ Vitamins C & E (HAIR/SKIN/NAILS PO) Take by mouth    Historical Provider, MD   Multiple Vitamins-Minerals (MULTIVITAMIN ADULT PO) Take by mouth    Historical Provider, MD       No future appointments.   ,   COPD Assessment    Does the patient understand envrionmental exposure?: Yes  Is the patient able to verbalize Rescue vs. Long Acting medications?: No  Does the patient have a nebulizer?: No  Does the patient use a space with inhaled medications?: No            Symptoms:        ,   General Assessment    Do you have any symptoms that are causing concern?: No      and Care Coordination Episodes    Type: Amb Care Coordination  Episode: Complex Care  Noted: 6/28/2021  Comments: COPD, HTN

## 2021-07-13 ENCOUNTER — TELEPHONE (OUTPATIENT)
Dept: ADMINISTRATIVE | Age: 86
End: 2021-07-13

## 2021-07-13 ENCOUNTER — CARE COORDINATION (OUTPATIENT)
Dept: CARE COORDINATION | Age: 86
End: 2021-07-13

## 2021-07-13 NOTE — CARE COORDINATION
Attempted to contact pt dtr regarding care coordination left hippa compliant message with call back info and updating dtr that pt needs an appt with pcp will schedule next out reach for 1 week

## 2021-07-13 NOTE — TELEPHONE ENCOUNTER
Tejas Magdaleno called for Emilee,she was just put on oxygen from an overnight order after testing; given a shot in her back 2 wks ago & it is not helping; declined next available appt in August; requesting advice and to see Dr Dread Beal sooner.   Please call

## 2021-07-19 ENCOUNTER — OFFICE VISIT (OUTPATIENT)
Dept: FAMILY MEDICINE CLINIC | Age: 86
End: 2021-07-19
Payer: MEDICARE

## 2021-07-19 VITALS
TEMPERATURE: 96.4 F | WEIGHT: 125 LBS | HEIGHT: 65 IN | HEART RATE: 60 BPM | DIASTOLIC BLOOD PRESSURE: 60 MMHG | SYSTOLIC BLOOD PRESSURE: 116 MMHG | BODY MASS INDEX: 20.83 KG/M2 | RESPIRATION RATE: 20 BRPM | OXYGEN SATURATION: 98 %

## 2021-07-19 DIAGNOSIS — K92.1 BLACK STOOL: ICD-10-CM

## 2021-07-19 DIAGNOSIS — R10.9 RIGHT FLANK PAIN: ICD-10-CM

## 2021-07-19 DIAGNOSIS — M81.0 AGE-RELATED OSTEOPOROSIS WITHOUT CURRENT PATHOLOGICAL FRACTURE: ICD-10-CM

## 2021-07-19 DIAGNOSIS — R32 URINARY INCONTINENCE, UNSPECIFIED TYPE: Primary | ICD-10-CM

## 2021-07-19 DIAGNOSIS — R41.3 MEMORY LOSS: ICD-10-CM

## 2021-07-19 PROCEDURE — 1090F PRES/ABSN URINE INCON ASSESS: CPT | Performed by: FAMILY MEDICINE

## 2021-07-19 PROCEDURE — G8420 CALC BMI NORM PARAMETERS: HCPCS | Performed by: FAMILY MEDICINE

## 2021-07-19 PROCEDURE — 0509F URINE INCON PLAN DOCD: CPT | Performed by: FAMILY MEDICINE

## 2021-07-19 PROCEDURE — 99213 OFFICE O/P EST LOW 20 MIN: CPT | Performed by: FAMILY MEDICINE

## 2021-07-19 PROCEDURE — 1123F ACP DISCUSS/DSCN MKR DOCD: CPT | Performed by: FAMILY MEDICINE

## 2021-07-19 PROCEDURE — 1036F TOBACCO NON-USER: CPT | Performed by: FAMILY MEDICINE

## 2021-07-19 PROCEDURE — 4040F PNEUMOC VAC/ADMIN/RCVD: CPT | Performed by: FAMILY MEDICINE

## 2021-07-19 PROCEDURE — G8427 DOCREV CUR MEDS BY ELIG CLIN: HCPCS | Performed by: FAMILY MEDICINE

## 2021-07-19 RX ORDER — MEMANTINE HYDROCHLORIDE 5 MG/1
TABLET ORAL
Qty: 180 TABLET | Refills: 3 | Status: SHIPPED
Start: 2021-07-19 | End: 2021-08-26 | Stop reason: SDUPTHER

## 2021-07-19 RX ORDER — OMEPRAZOLE 20 MG/1
20 CAPSULE, DELAYED RELEASE ORAL DAILY
Qty: 30 CAPSULE | Refills: 3 | Status: SHIPPED
Start: 2021-07-19 | End: 2021-12-31 | Stop reason: SDUPTHER

## 2021-07-19 RX ORDER — SERTRALINE HYDROCHLORIDE 25 MG/1
25 TABLET, FILM COATED ORAL DAILY
Qty: 30 TABLET | Refills: 3 | Status: SHIPPED
Start: 2021-07-19 | End: 2021-09-01 | Stop reason: SDUPTHER

## 2021-07-19 RX ORDER — ALENDRONATE SODIUM 70 MG/1
70 TABLET ORAL
Qty: 12 TABLET | Refills: 3 | Status: SHIPPED
Start: 2021-07-19 | End: 2022-02-15 | Stop reason: SDUPTHER

## 2021-07-19 RX ORDER — DONEPEZIL HYDROCHLORIDE 5 MG/1
5 TABLET, FILM COATED ORAL NIGHTLY
Qty: 30 TABLET | Refills: 3 | Status: SHIPPED
Start: 2021-07-19 | End: 2021-12-31 | Stop reason: SDUPTHER

## 2021-07-19 ASSESSMENT — ENCOUNTER SYMPTOMS
WHEEZING: 0
COUGH: 0
SHORTNESS OF BREATH: 0
ABDOMINAL PAIN: 0

## 2021-07-19 NOTE — PROGRESS NOTES
[address] [facility] [place]  [phone]   Guadalupe Wheat MD     Patient: Abhay Donnelly  YOB: 1929  Visit Date: 7/19/21    Cyndi Leigh is a 80y.o. year old female here today for   Chief Complaint   Patient presents with    Back Pain     follow up       HPI  Patient is a 80year old female here for back pain and fatigue. On oxygen at night. Cannot tell much difference. not quite as tired. Has not taken a nap in two days. Son is in town. Hip still hurts. Shot two weeks ago. Within a week pain was back. Has been urinating a lot . Right flank. Puts salonpas. no burning with urination. Having accidents. This is new . Hard to get there in time. Legs have been cramping more. Urine is dark. Has been having dark stool. 2-3 weeks. No saddle anesthesia. Eating less. Thinks it may be from hiatal hernia. No pain in belly. Review of Systems   Constitutional: Negative for chills and fever. Respiratory: Negative for cough, shortness of breath and wheezing. Cardiovascular: Negative for chest pain, palpitations and leg swelling. Gastrointestinal: Negative for abdominal pain. Neurological: Negative for dizziness and light-headedness.        Current Outpatient Medications on File Prior to Visit   Medication Sig Dispense Refill    fluticasone (FLONASE) 50 MCG/ACT nasal spray 1 spray by Each Nostril route daily      montelukast (SINGULAIR) 10 MG tablet Take 10 mg by mouth nightly      vitamin B-6 (PYRIDOXINE) 50 MG tablet Take 50 mg by mouth daily      meloxicam (MOBIC) 7.5 MG tablet TAKE ONE TABLET BY MOUTH DAILY 90 tablet 0    Umeclidinium Bromide (INCRUSE ELLIPTA) 62.5 MCG/INH AEPB Inhale 1 puff into the lungs daily 3 each 2    Handicap Placard MISC by Does not apply route Unable to ambulate more than 40 feet without difficulty  Expires 10/14/2025 1 each 0    meclizine (ANTIVERT) 12.5 MG tablet Take 12.5 mg by mouth 3 times daily as needed      magnesium oxide (MAG-OX) 400 MG tablet Take 400 mg by mouth daily      Cyanocobalamin (VITAMIN B 12 PO) Take by mouth      vitamin D (CHOLECALCIFEROL) 1000 UNIT TABS tablet Take 1,000 Units by mouth 2 times daily       Biotin w/ Vitamins C & E (HAIR/SKIN/NAILS PO) Take by mouth      Multiple Vitamins-Minerals (MULTIVITAMIN ADULT PO) Take by mouth      Azelastine-Fluticasone (DYMISTA) 137-50 MCG/ACT SUSP by Nasal route       No current facility-administered medications on file prior to visit. Allergies   Allergen Reactions    Demerol Hcl [Meperidine]      Other reaction(s): Intolerance  \"went into shok\"    Statins Other (See Comments)       Past medical, surgical, socialand/or family history reviewed, updated as needed, and are non-contributory (unless otherwise stated). Medications, allergies, and problem list also reviewed and updated as needed in patient's record. Wt Readings from Last 3 Encounters:   07/19/21 125 lb (56.7 kg)   06/17/21 127 lb (57.6 kg)   06/14/21 128 lb (58.1 kg)                   /60   Pulse 60   Temp 96.4 °F (35.8 °C)   Resp 20   Ht 5' 5\" (1.651 m)   Wt 125 lb (56.7 kg)   SpO2 98%   BMI 20.80 kg/m²        Physical Exam  Vitals and nursing note reviewed. Constitutional:       Appearance: She is well-developed. Cardiovascular:      Rate and Rhythm: Normal rate and regular rhythm. Heart sounds: Normal heart sounds. No murmur heard. No friction rub. Pulmonary:      Effort: Pulmonary effort is normal. No respiratory distress. Breath sounds: Normal breath sounds. No stridor. No wheezing or rales. Abdominal:      General: Bowel sounds are normal. There is no distension. Palpations: Abdomen is soft. There is no mass. Tenderness: There is no abdominal tenderness. There is no guarding or rebound. Musculoskeletal:         General: Normal range of motion. Right lower leg: No edema. Left lower leg: No edema.        Results for orders placed or performed in visit on 03/17/21   Culture, Urine    Specimen: Urine voided   Result Value Ref Range    Urine Culture, Routine       <10,000 CFU/mL  Mixed gram positive organisms  Gram negative rods     CBC   Result Value Ref Range    WBC 7.8 4.5 - 11.5 E9/L    RBC 4.91 3.50 - 5.50 E12/L    Hemoglobin 14.9 11.5 - 15.5 g/dL    Hematocrit 47.0 34.0 - 48.0 %    MCV 95.7 80.0 - 99.9 fL    MCH 30.3 26.0 - 35.0 pg    MCHC 31.7 (L) 32.0 - 34.5 %    RDW 12.5 11.5 - 15.0 fL    Platelets 725 739 - 054 E9/L    MPV 11.6 7.0 - 12.0 fL   Urinalysis   Result Value Ref Range    Color, UA Yellow Straw/Yellow    Clarity, UA Clear Clear    Glucose, Ur Negative Negative mg/dL    Bilirubin Urine Negative Negative    Ketones, Urine Negative Negative mg/dL    Specific Gravity, UA 1.020 1.005 - 1.030    Blood, Urine Negative Negative    pH, UA 6.5 5.0 - 9.0    Protein, UA Negative Negative mg/dL    Urobilinogen, Urine 0.2 <2.0 E.U./dL    Nitrite, Urine Negative Negative    Leukocyte Esterase, Urine SMALL (A) Negative   Microscopic Urinalysis   Result Value Ref Range    WBC, UA 5-10 (A) 0 - 5 /HPF    RBC, UA 0-1 0 - 2 /HPF    Epithelial Cells, UA MODERATE /HPF    Renal Epithelial, UA RARE /HPF    Bacteria, UA FEW (A) None Seen /HPF    Amorphous, UA RARE        ASSESSMENT/PLAN  Emilee was seen today for back pain. Diagnoses and all orders for this visit:    Urinary incontinence, unspecified type  -     Culture, Urine; Future  -     URINALYSIS; Future    Age-related osteoporosis without current pathological fracture  -     alendronate (FOSAMAX) 70 MG tablet; Take 1 tablet by mouth every 7 days    Memory loss  -     memantine (NAMENDA) 5 MG tablet; TAKE ONE TABLET BY MOUTH TWO TIMES A DAY    Right flank pain  -     CBC; Future  -     COMPREHENSIVE METABOLIC PANEL; Future    Black stool  -     Cologuard (For External Results Only); Future    Other orders  -     sertraline (ZOLOFT) 25 MG tablet;  Take 1 tablet by mouth daily  -     omeprazole (PRILOSEC) 20 MG delayed release capsule; Take 1 capsule by mouth daily  -     donepezil (ARICEPT) 5 MG tablet; Take 1 tablet by mouth nightly  -     mupirocin (BACTROBAN) 2 % ointment; Apply 3 times daily. Phone/MyChart follow up if tests abnormal.    No follow-ups on file. or sooner if necessary. I have reviewed myfindings and recommendations with Paolo Jacobo.  Jennifer Sim MD, M.D

## 2021-07-20 ENCOUNTER — CARE COORDINATION (OUTPATIENT)
Dept: CARE COORDINATION | Age: 86
End: 2021-07-20

## 2021-07-20 DIAGNOSIS — R10.9 RIGHT FLANK PAIN: ICD-10-CM

## 2021-07-20 DIAGNOSIS — R32 URINARY INCONTINENCE, UNSPECIFIED TYPE: ICD-10-CM

## 2021-07-20 LAB
ALBUMIN SERPL-MCNC: 4.4 G/DL (ref 3.5–5.2)
ALP BLD-CCNC: 65 U/L (ref 35–104)
ALT SERPL-CCNC: 11 U/L (ref 0–32)
ANION GAP SERPL CALCULATED.3IONS-SCNC: 9 MMOL/L (ref 7–16)
AST SERPL-CCNC: 21 U/L (ref 0–31)
BACTERIA: ABNORMAL /HPF
BILIRUB SERPL-MCNC: 0.4 MG/DL (ref 0–1.2)
BILIRUBIN URINE: NEGATIVE
BLOOD, URINE: NEGATIVE
BUN BLDV-MCNC: 10 MG/DL (ref 6–23)
CALCIUM SERPL-MCNC: 9.5 MG/DL (ref 8.6–10.2)
CHLORIDE BLD-SCNC: 98 MMOL/L (ref 98–107)
CLARITY: CLEAR
CO2: 32 MMOL/L (ref 22–29)
COLOR: YELLOW
CREAT SERPL-MCNC: 0.8 MG/DL (ref 0.5–1)
GFR AFRICAN AMERICAN: >60
GFR NON-AFRICAN AMERICAN: >60 ML/MIN/1.73
GLUCOSE BLD-MCNC: 115 MG/DL (ref 74–99)
GLUCOSE URINE: NEGATIVE MG/DL
HCT VFR BLD CALC: 43.8 % (ref 34–48)
HEMOGLOBIN: 14.1 G/DL (ref 11.5–15.5)
KETONES, URINE: NEGATIVE MG/DL
LEUKOCYTE ESTERASE, URINE: ABNORMAL
MCH RBC QN AUTO: 30.1 PG (ref 26–35)
MCHC RBC AUTO-ENTMCNC: 32.2 % (ref 32–34.5)
MCV RBC AUTO: 93.6 FL (ref 80–99.9)
NITRITE, URINE: NEGATIVE
PDW BLD-RTO: 13.6 FL (ref 11.5–15)
PH UA: 6 (ref 5–9)
PLATELET # BLD: 183 E9/L (ref 130–450)
PMV BLD AUTO: 11.6 FL (ref 7–12)
POTASSIUM SERPL-SCNC: 4 MMOL/L (ref 3.5–5)
PROTEIN UA: NEGATIVE MG/DL
RBC # BLD: 4.68 E12/L (ref 3.5–5.5)
RBC UA: ABNORMAL /HPF (ref 0–2)
SODIUM BLD-SCNC: 139 MMOL/L (ref 132–146)
SPECIFIC GRAVITY UA: 1.02 (ref 1–1.03)
TOTAL PROTEIN: 6.5 G/DL (ref 6.4–8.3)
UROBILINOGEN, URINE: 0.2 E.U./DL
WBC # BLD: 5 E9/L (ref 4.5–11.5)
WBC UA: ABNORMAL /HPF (ref 0–5)

## 2021-07-21 LAB — URINE CULTURE, ROUTINE: NORMAL

## 2021-07-26 ENCOUNTER — PATIENT MESSAGE (OUTPATIENT)
Dept: FAMILY MEDICINE CLINIC | Age: 86
End: 2021-07-26

## 2021-07-26 NOTE — TELEPHONE ENCOUNTER
From: Morenita Mercedes  To: Sadaf Hernandez.  Nadeem Roach MD  Sent: 7/26/2021 8:36 AM EDT  Subject: Visit Follow-Up Question    Good day Dr Alicea  I was just following up we never received the colorguard test for [place]   And I saw that some of her test results were back and wanted to follow up to see if anything was needing action  Thank you for your time and consideration  Rachell Torres

## 2021-07-27 ENCOUNTER — CARE COORDINATION (OUTPATIENT)
Dept: CARE COORDINATION | Age: 86
End: 2021-07-27

## 2021-07-27 NOTE — TELEPHONE ENCOUNTER
Called and discussed labs. Patient would like to discuss seeing gen surgery with her daughter as she has concerns that she has hemorrhoids. Will call insurance to see if cologuard is covered.

## 2021-07-27 NOTE — CARE COORDINATION
Ambulatory Care Coordination Note  7/27/2021  CM Risk Score: 2  Charlson 10 Year Mortality Risk Score: 98%     ACC: Alex Mcbride LPN    Summary Note: dtr states pt is extra tired this week son was in town for a week and pt was busy with him  -no reported falls  -states she is wearing o2 at 2L at night   -states she is having an increase of diarrhea and was concerned about lab results and is to call pcp for advise  -states she back is still causing pain and will address with pcp   -reviewed upcoming appts and reinforced zone tools will schedule next out reach for one week         Care Coordination Interventions    Program Enrollment: Complex Care  Referral from Primary Care Provider: No  Suggested Interventions and Community Resources  Fall Risk Prevention: In Process (Comment: Fall Precaution)  Home Health Services: In Process (Comment: Has private aid M and W)  Meals on Wheels: Declined  Medication Assistance Program: [address] or Pill Pack: Declined  Pharmacist: Declined  Registered Dietician: Declined  Social Work: Declined  Transportation Support: Completed  Zone Management Tools: In Process (Comment: COPD)         Goals Addressed    None         Prior to Admission medications    Medication Sig Start Date End Date Taking?  Authorizing Provider   alendronate (FOSAMAX) 70 MG tablet Take 1 tablet by mouth every 7 days 7/19/21   Wai Bright MD   sertraline (ZOLOFT) 25 MG tablet Take 1 tablet by mouth daily 7/19/21   Wai Bright MD   omeprazole (PRILOSEC) 20 MG delayed release capsule Take 1 capsule by mouth daily 7/19/21   Wai Bright MD   memantine (NAMENDA) 5 MG tablet TAKE ONE TABLET BY MOUTH TWO TIMES A DAY 7/19/21   Wai Bright MD   donepezil (ARICEPT) 5 MG tablet Take 1 tablet by mouth nightly 7/19/21   Wai Bright MD   fluticasone (FLONASE) 50 MCG/ACT nasal spray 1 spray by Each Nostril route daily    Historical Provider, MD   montelukast (SINGULAIR) 10 MG tablet Take 10 mg by mouth nightly    Historical Provider, MD   vitamin B-6 (PYRIDOXINE) 50 MG tablet Take 50 mg by mouth daily    Historical Provider, MD   meloxicam (MOBIC) 7.5 MG tablet TAKE ONE TABLET BY MOUTH DAILY 5/11/21   Won Bhatia MD   Umeclidinium Bromide (INCRUSE ELLIPTA) 62.5 MCG/INH AEPB Inhale 1 puff into the lungs daily 10/14/20   Won Bhatia MD   Handicap Placard MISC by Does not apply route Unable to ambulate more than 40 feet without difficulty  Expires 10/14/2025 10/14/20   Won Bhatia MD   meclizine (ANTIVERT) 12.5 MG tablet Take 12.5 mg by mouth 3 times daily as needed    Historical Provider, MD   magnesium oxide (MAG-OX) 400 MG tablet Take 400 mg by mouth daily    Historical Provider, MD   Cyanocobalamin (VITAMIN B 12 PO) Take by mouth    Historical Provider, MD   vitamin D (CHOLECALCIFEROL) 1000 UNIT TABS tablet Take 1,000 Units by mouth 2 times daily     Historical Provider, MD   Biotin w/ Vitamins C & E (HAIR/SKIN/NAILS PO) Take by mouth    Historical Provider, MD   Multiple Vitamins-Minerals (MULTIVITAMIN ADULT PO) Take by mouth    Historical Provider, MD       Future Appointments   Date Time Provider Mikaela Philippe   8/23/2021  9:45 AM Won Bhatia MD [facility]     ,   COPD Assessment    Does the patient understand envrionmental exposure?: Yes  Is the patient able to verbalize Rescue vs. Long Acting medications?: No  Does the patient have a nebulizer?: No  Does the patient use a space with inhaled medications?: No            Symptoms:        ,   General Assessment    Do you have any symptoms that are causing concern?: No      and Care Coordination Episodes    Type: Amb Care Coordination  Episode: Complex Care  Noted: 6/28/2021  Comments: COPD, HTN

## 2021-08-03 ENCOUNTER — CARE COORDINATION (OUTPATIENT)
Dept: CARE COORDINATION | Age: 86
End: 2021-08-03

## 2021-08-03 NOTE — CARE COORDINATION
Ambulatory Care Coordination Note  8/3/2021  CM Risk Score: 2  Charlson 10 Year Mortality Risk Score: 98%     ACC: Minus Letters, LPN    Summary Note: spoke with dtr and she states pt is doing well  Currently visiting with family  -no reported falls  -pt does wear o2 at 2L at night  -states she has increase of urination and was addressed with pcp along with other labs and pt reports no pain so if cont will submit a urine sample for testing  -reinforced zone tools and will schedule next out reach for 2 weeks         Care Coordination Interventions    Program Enrollment: Complex Care  Referral from Primary Care Provider: No  Suggested Interventions and Community Resources  Fall Risk Prevention: In Process (Comment: Fall Precaution)  Home Health Services: In Process (Comment: Has private aid M and W)  Meals on Wheels: Declined  Medication Assistance Program: [address] [place] [address]  or Pill Pack: Declined  Pharmacist: Declined  Registered Dietician: Declined  Social Work: Declined  Transportation Support: Completed  Zone Management Tools: In Process (Comment: COPD)         Goals Addressed    None         Prior to Admission medications    Medication Sig Start Date End Date Taking?  Authorizing Provider   alendronate (FOSAMAX) 70 MG tablet Take 1 tablet by mouth every 7 days 7/19/21   Haylee Rodriguez MD   sertraline (ZOLOFT) 25 MG tablet Take 1 tablet by mouth daily 7/19/21   Haylee Rodriguez MD   omeprazole (PRILOSEC) 20 MG delayed release capsule Take 1 capsule by mouth daily 7/19/21   Haylee Rodriguez MD   memantine (NAMENDA) 5 MG tablet TAKE ONE TABLET BY MOUTH TWO TIMES A DAY 7/19/21   Haylee Rodriguez MD   donepezil (ARICEPT) 5 MG tablet Take 1 tablet by mouth nightly 7/19/21   Haylee Rodriguez MD   fluticasone (FLONASE) 50 MCG/ACT nasal spray 1 spray by Each Nostril route daily    Historical Provider, MD   montelukast (SINGULAIR) 10 MG tablet Take 10 mg by mouth nightly    Historical Provider, MD   vitamin

## 2021-08-17 ENCOUNTER — CARE COORDINATION (OUTPATIENT)
Dept: CARE COORDINATION | Age: 86
End: 2021-08-17

## 2021-08-17 NOTE — CARE COORDINATION
Ambulatory Care Coordination Note  8/17/2021  CM Risk Score: 2  Charlson 10 Year Mortality Risk Score: 98%     ACC: Mariama Olson LPN    Summary Note: spoke with dtr andrew  And she reports pt is doing ok  -did reschedule pcp appt d/t family going out of town  -states pt is still wearing cpap and o2 no reports of shortness of breath  -uti resolved increased frequency but no pain   -c/o of achy and tired pt is due for injection 9/27  -reviewed upcoming appts and will schedule next out reach for 3 weeks       Care Coordination Interventions    Program Enrollment: Complex Care  Referral from Primary Care Provider: No  Suggested Interventions and Community Resources  Fall Risk Prevention: In Process (Comment: Fall Precaution)  Home Health Services: In Process (Comment: Has private aid M and W)  Meals on Wheels: Declined  Medication Assistance Program: [facility] [address] or Pill Pack: Declined  Pharmacist: Declined  Registered Dietician: Declined  Social Work: Declined  Transportation Support: Completed  Zone Management Tools: In Process (Comment: COPD)         Goals Addressed    None         Prior to Admission medications    Medication Sig Start Date End Date Taking?  Authorizing Provider   alendronate (FOSAMAX) 70 MG tablet Take 1 tablet by mouth every 7 days 7/19/21   Valentino Manson, MD   sertraline (ZOLOFT) 25 MG tablet Take 1 tablet by mouth daily 7/19/21   Valentino Manson, MD   omeprazole (PRILOSEC) 20 MG delayed release capsule Take 1 capsule by mouth daily 7/19/21   Valentino Manson, MD   memantine (NAMENDA) 5 MG tablet TAKE ONE TABLET BY MOUTH TWO TIMES A DAY 7/19/21   Valentino Manson, MD   donepezil (ARICEPT) 5 MG tablet Take 1 tablet by mouth nightly 7/19/21   Valentino Manson, MD   fluticasone (FLONASE) 50 MCG/ACT nasal spray 1 spray by Each Nostril route daily    Historical Provider, MD   montelukast (SINGULAIR) 10 MG tablet Take 10 mg by mouth nightly    Historical Provider, MD   vitamin B-6 (PYRIDOXINE) 50 MG tablet Take 50 mg by mouth daily    Historical Provider, MD   meloxicam (MOBIC) 7.5 MG tablet TAKE ONE TABLET BY MOUTH DAILY 5/11/21   Haylee Rodriguez MD   Umeclidinium Bromide (INCRUSE ELLIPTA) 62.5 MCG/INH AEPB Inhale 1 puff into the lungs daily 10/14/20   Haylee Rodriguez MD   Handicap Placard MISC by Does not apply route Unable to ambulate more than 40 feet without difficulty  Expires 10/14/2025 10/14/20   Haylee Rodriguez MD   meclizine (ANTIVERT) 12.5 MG tablet Take 12.5 mg by mouth 3 times daily as needed    Historical Provider, MD   magnesium oxide (MAG-OX) 400 MG tablet Take 400 mg by mouth daily    Historical Provider, MD   Cyanocobalamin (VITAMIN B 12 PO) Take by mouth    Historical Provider, MD   vitamin D (CHOLECALCIFEROL) 1000 UNIT TABS tablet Take 1,000 Units by mouth 2 times daily     Historical Provider, MD   Biotin w/ Vitamins C & E (HAIR/SKIN/NAILS PO) Take by mouth    Historical Provider, MD   Multiple Vitamins-Minerals (MULTIVITAMIN ADULT PO) Take by mouth    Historical Provider, MD       Future Appointments   Date Time Provider Mikaela Denae   9/1/2021  2:45 PM Haylee Rodriguez MD [facility]   9/27/2021 10:30 AM Fdiencio Cummings DO [facility]     ,   COPD Assessment    Does the patient understand envrionmental exposure?: Yes  Is the patient able to verbalize Rescue vs. Long Acting medications?: No  Does the patient have a nebulizer?: No  Does the patient use a space with inhaled medications?: No            Symptoms:        ,   General Assessment    Do you have any symptoms that are causing concern?: No      and Care Coordination Episodes    Type: Amb Care Coordination  Episode: Complex Care  Noted: 6/28/2021  Comments: COPD, HTN

## 2021-08-26 DIAGNOSIS — R41.3 MEMORY LOSS: ICD-10-CM

## 2021-08-26 RX ORDER — MEMANTINE HYDROCHLORIDE 5 MG/1
TABLET ORAL
Qty: 180 TABLET | Refills: 3 | Status: SHIPPED
Start: 2021-08-26 | End: 2021-10-18 | Stop reason: SDUPTHER

## 2021-09-01 ENCOUNTER — OFFICE VISIT (OUTPATIENT)
Dept: FAMILY MEDICINE CLINIC | Age: 86
End: 2021-09-01
Payer: MEDICARE

## 2021-09-01 VITALS
BODY MASS INDEX: 20.78 KG/M2 | SYSTOLIC BLOOD PRESSURE: 121 MMHG | RESPIRATION RATE: 16 BRPM | HEIGHT: 65 IN | TEMPERATURE: 96.1 F | OXYGEN SATURATION: 96 % | DIASTOLIC BLOOD PRESSURE: 70 MMHG | HEART RATE: 59 BPM | WEIGHT: 124.7 LBS

## 2021-09-01 DIAGNOSIS — F32.1 MODERATE SINGLE CURRENT EPISODE OF MAJOR DEPRESSIVE DISORDER (HCC): ICD-10-CM

## 2021-09-01 DIAGNOSIS — G31.84 MILD COGNITIVE IMPAIRMENT: Primary | ICD-10-CM

## 2021-09-01 DIAGNOSIS — J41.0 SIMPLE CHRONIC BRONCHITIS (HCC): ICD-10-CM

## 2021-09-01 PROCEDURE — G8428 CUR MEDS NOT DOCUMENT: HCPCS | Performed by: FAMILY MEDICINE

## 2021-09-01 PROCEDURE — G8420 CALC BMI NORM PARAMETERS: HCPCS | Performed by: FAMILY MEDICINE

## 2021-09-01 PROCEDURE — 1090F PRES/ABSN URINE INCON ASSESS: CPT | Performed by: FAMILY MEDICINE

## 2021-09-01 PROCEDURE — 99213 OFFICE O/P EST LOW 20 MIN: CPT | Performed by: FAMILY MEDICINE

## 2021-09-01 PROCEDURE — G8926 SPIRO NO PERF OR DOC: HCPCS | Performed by: FAMILY MEDICINE

## 2021-09-01 PROCEDURE — 3023F SPIROM DOC REV: CPT | Performed by: FAMILY MEDICINE

## 2021-09-01 PROCEDURE — 4040F PNEUMOC VAC/ADMIN/RCVD: CPT | Performed by: FAMILY MEDICINE

## 2021-09-01 PROCEDURE — 1123F ACP DISCUSS/DSCN MKR DOCD: CPT | Performed by: FAMILY MEDICINE

## 2021-09-01 PROCEDURE — 1036F TOBACCO NON-USER: CPT | Performed by: FAMILY MEDICINE

## 2021-09-01 NOTE — PROGRESS NOTES
ambulate more than 40 feet without difficulty  Expires 10/14/2025 1 each 0    meclizine (ANTIVERT) 12.5 MG tablet Take 12.5 mg by mouth 3 times daily as needed      magnesium oxide (MAG-OX) 400 MG tablet Take 400 mg by mouth daily      Cyanocobalamin (VITAMIN B 12 PO) Take by mouth      vitamin D (CHOLECALCIFEROL) 1000 UNIT TABS tablet Take 1,000 Units by mouth 2 times daily       Biotin w/ Vitamins C & E (HAIR/SKIN/NAILS PO) Take by mouth      Multiple Vitamins-Minerals (MULTIVITAMIN ADULT PO) Take by mouth       No current facility-administered medications on file prior to visit. Allergies   Allergen Reactions    Demerol Hcl [Meperidine]      Other reaction(s): Intolerance  \"went into shok\"    Statins Other (See Comments)       Past medical, surgical, socialand/or family history reviewed, updated as needed, and are non-contributory (unless otherwise stated). Medications, allergies, and problem list also reviewed and updated as needed in patient's record. Wt Readings from Last 3 Encounters:   09/01/21 124 lb 11.2 oz (56.6 kg)   07/19/21 125 lb (56.7 kg)   06/17/21 127 lb (57.6 kg)                   /70 (Site: Right Upper Arm, Position: Sitting, Cuff Size: Medium Adult)   Pulse 59   Temp 96.1 °F (35.6 °C) (Temporal)   Resp 16   Ht 5' 5\" (1.651 m)   Wt 124 lb 11.2 oz (56.6 kg)   SpO2 96%   BMI 20.75 kg/m²        Physical Exam  Vitals and nursing note reviewed. Constitutional:       Appearance: She is well-developed. Cardiovascular:      Rate and Rhythm: Normal rate and regular rhythm. Heart sounds: Normal heart sounds. No murmur heard. No friction rub. Pulmonary:      Effort: Pulmonary effort is normal. No respiratory distress. Breath sounds: Normal breath sounds. No stridor. No wheezing or rales. Abdominal:      General: Bowel sounds are normal. There is no distension. Palpations: Abdomen is soft. There is no mass. Tenderness:  There is no abdominal tenderness. There is no guarding or rebound. Musculoskeletal:         General: Normal range of motion. Right lower leg: No edema. Left lower leg: No edema. Results for orders placed or performed in visit on 07/20/21   Culture, Urine    Specimen: Urine voided   Result Value Ref Range    Urine Culture, Routine       10 to 100,000 CFU/mL  Mixed naresh isolated. Further workup and sensitivity testing  is not routinely indicated and will not be performed.   Mixed naresh isolated includes:  Mixed gram positive organisms  Alpha hemolytic Strep species  Beta hemolytic Strep species  Nonhemolytic Strep species  Coagulase negative Staph species     COMPREHENSIVE METABOLIC PANEL   Result Value Ref Range    Sodium 139 132 - 146 mmol/L    Potassium 4.0 3.5 - 5.0 mmol/L    Chloride 98 98 - 107 mmol/L    CO2 32 (H) 22 - 29 mmol/L    Anion Gap 9 7 - 16 mmol/L    Glucose 115 (H) 74 - 99 mg/dL    BUN 10 6 - 23 mg/dL    CREATININE 0.8 0.5 - 1.0 mg/dL    GFR Non-African American >60 >=60 mL/min/1.73    GFR African American >60     Calcium 9.5 8.6 - 10.2 mg/dL    Total Protein 6.5 6.4 - 8.3 g/dL    Albumin 4.4 3.5 - 5.2 g/dL    Total Bilirubin 0.4 0.0 - 1.2 mg/dL    Alkaline Phosphatase 65 35 - 104 U/L    ALT 11 0 - 32 U/L    AST 21 0 - 31 U/L   CBC   Result Value Ref Range    WBC 5.0 4.5 - 11.5 E9/L    RBC 4.68 3.50 - 5.50 E12/L    Hemoglobin 14.1 11.5 - 15.5 g/dL    Hematocrit 43.8 34.0 - 48.0 %    MCV 93.6 80.0 - 99.9 fL    MCH 30.1 26.0 - 35.0 pg    MCHC 32.2 32.0 - 34.5 %    RDW 13.6 11.5 - 15.0 fL    Platelets 419 847 - 408 E9/L    MPV 11.6 7.0 - 12.0 fL   Urinalysis   Result Value Ref Range    Color, UA Yellow Straw/Yellow    Clarity, UA Clear Clear    Glucose, Ur Negative Negative mg/dL    Bilirubin Urine Negative Negative    Ketones, Urine Negative Negative mg/dL    Specific Gravity, UA 1.025 1.005 - 1.030    Blood, Urine Negative Negative    pH, UA 6.0 5.0 - 9.0    Protein, UA Negative Negative mg/dL Urobilinogen, Urine 0.2 <2.0 E.U./dL    Nitrite, Urine Negative Negative    Leukocyte Esterase, Urine MODERATE (A) Negative   Microscopic Urinalysis   Result Value Ref Range    WBC, UA 5-10 (A) 0 - 5 /HPF    RBC, UA 0-1 0 - 2 /HPF    Bacteria, UA RARE (A) None Seen /HPF       ASSESSMENT/PLAN  Emilee was seen today for incontinence. Diagnoses and all orders for this visit:    Mild cognitive impairment   - On namenda and aricept. Will titrate up as appropriate   Simple chronic bronchitis (HCC)   - No breathing issues at this time. Moderate single current episode of major depressive disorder (HCC)  -     sertraline (ZOLOFT) 50 MG tablet; Take 1 tablet by mouth daily  - Increase to 50mg           Phone/MyChart follow up if tests abnormal.    Return in about 1 month (around 10/1/2021). or sooner if necessary. I have reviewed myfindings and recommendations with Emilee. Adama Coles.  Tahir Vila MD, M.D

## 2021-09-10 ASSESSMENT — ENCOUNTER SYMPTOMS
SHORTNESS OF BREATH: 0
COUGH: 0
ABDOMINAL PAIN: 0
WHEEZING: 0

## 2021-09-11 ENCOUNTER — CARE COORDINATION (OUTPATIENT)
Dept: CARE COORDINATION | Age: 86
End: 2021-09-11

## 2021-09-27 ENCOUNTER — OFFICE VISIT (OUTPATIENT)
Dept: PHYSICAL MEDICINE AND REHAB | Age: 86
End: 2021-09-27
Payer: MEDICARE

## 2021-09-27 VITALS
BODY MASS INDEX: 20.71 KG/M2 | TEMPERATURE: 97.1 F | WEIGHT: 124.3 LBS | SYSTOLIC BLOOD PRESSURE: 144 MMHG | DIASTOLIC BLOOD PRESSURE: 81 MMHG | HEIGHT: 65 IN | HEART RATE: 66 BPM

## 2021-09-27 DIAGNOSIS — M53.3 SACROILIAC JOINT DYSFUNCTION OF RIGHT SIDE: Primary | ICD-10-CM

## 2021-09-27 DIAGNOSIS — M53.3 SACROILIAC JOINT DYSFUNCTION OF RIGHT SIDE: ICD-10-CM

## 2021-09-27 PROCEDURE — 76942 ECHO GUIDE FOR BIOPSY: CPT | Performed by: PHYSICAL MEDICINE & REHABILITATION

## 2021-09-27 PROCEDURE — 20552 NJX 1/MLT TRIGGER POINT 1/2: CPT | Performed by: PHYSICAL MEDICINE & REHABILITATION

## 2021-09-27 RX ORDER — TRIAMCINOLONE ACETONIDE 40 MG/ML
40 INJECTION, SUSPENSION INTRA-ARTICULAR; INTRAMUSCULAR ONCE
Status: COMPLETED | OUTPATIENT
Start: 2021-09-27 | End: 2021-09-27

## 2021-09-27 RX ORDER — LIDOCAINE HYDROCHLORIDE 10 MG/ML
2 INJECTION, SOLUTION INFILTRATION; PERINEURAL ONCE
Status: COMPLETED | OUTPATIENT
Start: 2021-09-27 | End: 2021-09-27

## 2021-09-27 RX ADMIN — LIDOCAINE HYDROCHLORIDE 2 ML: 10 INJECTION, SOLUTION INFILTRATION; PERINEURAL at 11:46

## 2021-09-27 RX ADMIN — TRIAMCINOLONE ACETONIDE 40 MG: 40 INJECTION, SUSPENSION INTRA-ARTICULAR; INTRAMUSCULAR at 11:47

## 2021-09-27 NOTE — PROGRESS NOTES
///Noreen Jaeger D.O. [facility]  [address] [facility] [facility]  Phone: [phone]  Fax: [phone]    9/27/2021    Chief Complaint   Patient presents with    Hip Pain     right SI joint injection       Last injection:6/1/20  Taking anticoagulants/antiplatelets: No  Diabetic: No  Febrile/active infection: No    After explaining the indications, risks, benefits and alternatives of a right sacroiliac joint injection, the patient agreed to proceed. A permit was signed and scanned into the chart. The patient was placed in the prone position and draped for modesty. The skin on the Right upper buttock was prepared with Chloraprep. Using aseptic no touch technique, a 22 gauge, 3\" needle with 1 cc of Kenalog 40mg/cc and 1 cc of 1% lidocaine was directed to the joint using ultrasound guidance. After negative aspiration, the medication was injected. Adequate hemostasis was achieved and a bandage applied. The patient tolerated the procedure well and was educated in post injection care. The patient was clinically monitored after the injection and left the office without incident. There was post injection reduction in pain. Ultrasound images are scanned in the electronic medical record separately. Lyndon Fofana D.O., P.T.   Board Certified Physical Medicine and Rehabilitation  Board Certified Electrodiagnostic Medicine    Administrations This Visit     lidocaine 1 % injection 2 mL     Admin Date  09/27/2021  11:46 Action  Given Dose  2 mL Route  Other Site   Administered By  Eva Acevedo LPN    Ordering Provider: Marguerite Mortensen DO    NDC: 1580-5683-43    Lot#: [id number]    : HOSPIRA    Patient Supplied?: No          triamcinolone acetonide (KENALOG-40) injection 40 mg     Admin Date  09/27/2021  11:47 Action  Given Dose  40 mg Route  Intra-articular Site   Administered By  Eva Acevedo LPN    Ordering Provider: Marguerite Mortensen DO

## 2021-10-05 ENCOUNTER — TELEPHONE (OUTPATIENT)
Dept: PHYSICAL MEDICINE AND REHAB | Age: 86
End: 2021-10-05

## 2021-10-05 ENCOUNTER — CARE COORDINATION (OUTPATIENT)
Dept: CARE COORDINATION | Age: 86
End: 2021-10-05

## 2021-10-05 ASSESSMENT — ENCOUNTER SYMPTOMS: DYSPNEA ASSOCIATED WITH: EXERTION

## 2021-10-05 NOTE — TELEPHONE ENCOUNTER
Called and spoke to daughter of patient states she had about 70 percent effectiveness from injection (Right SI ) no side effects, patient did fall at home recently but daughter said she is ok instructed to call us if any problems family unsure to proceed with ablation yet.

## 2021-10-05 NOTE — CARE COORDINATION
Ambulatory Care Coordination Note  10/5/2021  CM Risk Score: 2  Charlson 10 Year Mortality Risk Score: 98%     ACC: Aminata Stevens RN    Summary Note: Denies s/s of COPD Exacerbation. Discussed the Zone Tool and verbalizes understanding. Pt states she is doing well. Pt states she is eating well and trying to stay active. States she feels that she is tired during the day only because she has nothing to do. Encouraged her to try to stay busy with a hobby. Pt chuckled and states she does what she can. Pt states she had a fall recently without injury. Daughter witnessed it. Pt states she got up and tried to go too quickly. Discussed fall precautions with daughter and pt. Verbalized understanding. FOLLOW-UP PLAN:    Discuss:   -Any falls or near falls? -COPD Management/Zone Tool  -OV AVS Reinforcement  -Appointment Reminders    Next Anticipated Outreach by [address]  Team:  2 Weeks          Care Coordination Interventions    Program Enrollment: Complex Care  Referral from Primary Care Provider: No  Suggested Interventions and Community Resources  Fall Risk Prevention: In Process (Comment: Fall Precaution)  Home Health Services: In Process (Comment: Has private aid M and W)  Meals on Wheels: Declined  Medication Assistance Program: [address] or Pill Pack: Declined  Pharmacist: Declined  Registered Dietician: Declined  Social Work: Declined  Transportation Support: Completed  Zone Management Tools: In Process (Comment: COPD)         Goals Addressed                 This Visit's Progress     Conditions and Symptoms   On track     I will schedule office visits, as directed by my provider. I will keep my appointment or reschedule if I have to cancel. I will notify my provider of any barriers to my plan of care. I will follow my Zone Management tool to seek urgent or emergent care. I will notify my provider of any symptoms that indicate a worsening of my condition.     Barriers: impairment: Cognitive:  Forgetful  Plan for overcoming my barriers: DaughterNate, will have open communication with care team  Confidence: 10/10  Anticipated Goal Completion Date: 6/28/21         Medication Management   On track     I will take my medication as directed. I will notify my provider of any problems with medications, like adverse effects or side effects. I will notify my provider/Care Coordinator if I am unable to afford my medications. I will notify my provider for advice before I stop taking any of my medication. Barriers: impairment:  Cognitive   Plan for overcoming my barriers: DaughterNate, will openly communicate with Care Team  Confidence: 10/10  Anticipated Goal Completion Date: 9/28/21            Prior to Admission medications    Medication Sig Start Date End Date Taking?  Authorizing Provider   LORATADINE PO Take 10 mg by mouth    Historical Provider, MD   sertraline (ZOLOFT) 50 MG tablet Take 1 tablet by mouth daily 9/1/21   Janette Garza MD   memantine (NAMENDA) 5 MG tablet TAKE two TABLETs in the morning and 1 tablet in the evening 8/26/21   Janette Garza MD   alendronate (FOSAMAX) 70 MG tablet Take 1 tablet by mouth every 7 days 7/19/21   Janette Garza MD   omeprazole (PRILOSEC) 20 MG delayed release capsule Take 1 capsule by mouth daily 7/19/21   Janette Garza MD   donepezil (ARICEPT) 5 MG tablet Take 1 tablet by mouth nightly 7/19/21   Janette Garza MD   fluticasone (FLONASE) 50 MCG/ACT nasal spray 1 spray by Each Nostril route daily    Historical Provider, MD   montelukast (SINGULAIR) 10 MG tablet Take 10 mg by mouth nightly    Historical Provider, MD   vitamin B-6 (PYRIDOXINE) 50 MG tablet Take 50 mg by mouth daily    Historical Provider, MD   meloxicam (MOBIC) 7.5 MG tablet TAKE ONE TABLET BY MOUTH DAILY 5/11/21   Janette Garza MD   Umeclidinium Bromide (INCRUSE ELLIPTA) 62.5 MCG/INH AEPB Inhale 1 puff into the lungs daily 10/14/20   Janette Garza MD Handicap Placard MISC by Does not apply route Unable to ambulate more than 40 feet without difficulty  Expires 10/14/2025 10/14/20   Pranay Escamilla MD   meclizine (ANTIVERT) 12.5 MG tablet Take 12.5 mg by mouth 3 times daily as needed    Historical Provider, MD   magnesium oxide (MAG-OX) 400 MG tablet Take 400 mg by mouth daily    Historical Provider, MD   Cyanocobalamin (VITAMIN B 12 PO) Take by mouth    Historical Provider, MD   vitamin D (CHOLECALCIFEROL) 1000 UNIT TABS tablet Take 1,000 Units by mouth 2 times daily     Historical Provider, MD   Biotin w/ Vitamins C & E (HAIR/SKIN/NAILS PO) Take by mouth    Historical Provider, MD   Multiple Vitamins-Minerals (MULTIVITAMIN ADULT PO) Take by mouth    Historical Provider, MD       Future Appointments   Date Time Provider Mikaela Phillipi   10[facility]18/2021 11:45 [facility]      and   COPD Assessment    Does the patient understand envrionmental exposure?: Yes  Is the patient able to verbalize Rescue vs. Long Acting medications?: No  Does the patient have a nebulizer?: No  Does the patient use a space with inhaled medications?: No     No patient-reported symptoms         Symptoms:  None: Yes      Symptom course: stable  Breathlessness: exertion  Increase use of rapid acting/rescue inhaled medications?: No  Change in chronic cough?: No/At Baseline  Change in sputum?: No/At Baseline  Have you had a recent diagnosis of pneumonia either by PCP or at a hospital?: No

## 2021-10-18 ENCOUNTER — OFFICE VISIT (OUTPATIENT)
Dept: FAMILY MEDICINE CLINIC | Age: 86
End: 2021-10-18
Payer: MEDICARE

## 2021-10-18 VITALS
RESPIRATION RATE: 20 BRPM | HEART RATE: 56 BPM | BODY MASS INDEX: 20.16 KG/M2 | TEMPERATURE: 96.8 F | WEIGHT: 121 LBS | SYSTOLIC BLOOD PRESSURE: 126 MMHG | HEIGHT: 65 IN | OXYGEN SATURATION: 97 % | DIASTOLIC BLOOD PRESSURE: 80 MMHG

## 2021-10-18 DIAGNOSIS — G31.84 MILD COGNITIVE IMPAIRMENT: Primary | ICD-10-CM

## 2021-10-18 DIAGNOSIS — J41.0 SIMPLE CHRONIC BRONCHITIS (HCC): ICD-10-CM

## 2021-10-18 DIAGNOSIS — F32.1 MODERATE SINGLE CURRENT EPISODE OF MAJOR DEPRESSIVE DISORDER (HCC): ICD-10-CM

## 2021-10-18 PROCEDURE — 3023F SPIROM DOC REV: CPT | Performed by: FAMILY MEDICINE

## 2021-10-18 PROCEDURE — G8926 SPIRO NO PERF OR DOC: HCPCS | Performed by: FAMILY MEDICINE

## 2021-10-18 PROCEDURE — 4040F PNEUMOC VAC/ADMIN/RCVD: CPT | Performed by: FAMILY MEDICINE

## 2021-10-18 PROCEDURE — 1036F TOBACCO NON-USER: CPT | Performed by: FAMILY MEDICINE

## 2021-10-18 PROCEDURE — 1090F PRES/ABSN URINE INCON ASSESS: CPT | Performed by: FAMILY MEDICINE

## 2021-10-18 PROCEDURE — 99213 OFFICE O/P EST LOW 20 MIN: CPT | Performed by: FAMILY MEDICINE

## 2021-10-18 PROCEDURE — G8427 DOCREV CUR MEDS BY ELIG CLIN: HCPCS | Performed by: FAMILY MEDICINE

## 2021-10-18 PROCEDURE — G8420 CALC BMI NORM PARAMETERS: HCPCS | Performed by: FAMILY MEDICINE

## 2021-10-18 PROCEDURE — 1123F ACP DISCUSS/DSCN MKR DOCD: CPT | Performed by: FAMILY MEDICINE

## 2021-10-18 PROCEDURE — G8484 FLU IMMUNIZE NO ADMIN: HCPCS | Performed by: FAMILY MEDICINE

## 2021-10-18 RX ORDER — UMECLIDINIUM 62.5 UG/1
1 AEROSOL, POWDER ORAL DAILY
Qty: 3 EACH | Refills: 2 | Status: SHIPPED
Start: 2021-10-18 | End: 2022-06-18 | Stop reason: SDUPTHER

## 2021-10-18 RX ORDER — MEMANTINE HYDROCHLORIDE 5 MG/1
TABLET ORAL
Qty: 180 TABLET | Refills: 3 | Status: SHIPPED
Start: 2021-10-18 | End: 2022-02-15 | Stop reason: SDUPTHER

## 2021-10-18 ASSESSMENT — ENCOUNTER SYMPTOMS
WHEEZING: 0
ABDOMINAL PAIN: 0
SHORTNESS OF BREATH: 0
COUGH: 0

## 2021-10-18 NOTE — PATIENT INSTRUCTIONS
Decrease claritin to 5mg   Increase namenda to 10mg twice a day   If you have anymore of those heart palpitations call me   Follow up in 6 weeks or sooner as needed.

## 2021-10-18 NOTE — PROGRESS NOTES
mass.      Tenderness: There is no abdominal tenderness. There is no guarding or rebound. Musculoskeletal:         General: Normal range of motion. Right lower leg: No edema. Left lower leg: No edema. Results for orders placed or performed in visit on 07/20/21   Culture, Urine    Specimen: Urine voided   Result Value Ref Range    Urine Culture, Routine       10 to 100,000 CFU/mL  Mixed naresh isolated. Further workup and sensitivity testing  is not routinely indicated and will not be performed.   Mixed naresh isolated includes:  Mixed gram positive organisms  Alpha hemolytic Strep species  Beta hemolytic Strep species  Nonhemolytic Strep species  Coagulase negative Staph species     COMPREHENSIVE METABOLIC PANEL   Result Value Ref Range    Sodium 139 132 - 146 mmol/L    Potassium 4.0 3.5 - 5.0 mmol/L    Chloride 98 98 - 107 mmol/L    CO2 32 (H) 22 - 29 mmol/L    Anion Gap 9 7 - 16 mmol/L    Glucose 115 (H) 74 - 99 mg/dL    BUN 10 6 - 23 mg/dL    CREATININE 0.8 0.5 - 1.0 mg/dL    GFR Non-African American >60 >=60 mL/min/1.73    GFR African American >60     Calcium 9.5 8.6 - 10.2 mg/dL    Total Protein 6.5 6.4 - 8.3 g/dL    Albumin 4.4 3.5 - 5.2 g/dL    Total Bilirubin 0.4 0.0 - 1.2 mg/dL    Alkaline Phosphatase 65 35 - 104 U/L    ALT 11 0 - 32 U/L    AST 21 0 - 31 U/L   CBC   Result Value Ref Range    WBC 5.0 4.5 - 11.5 E9/L    RBC 4.68 3.50 - 5.50 E12/L    Hemoglobin 14.1 11.5 - 15.5 g/dL    Hematocrit 43.8 34.0 - 48.0 %    MCV 93.6 80.0 - 99.9 fL    MCH 30.1 26.0 - 35.0 pg    MCHC 32.2 32.0 - 34.5 %    RDW 13.6 11.5 - 15.0 fL    Platelets 930 448 - 838 E9/L    MPV 11.6 7.0 - 12.0 fL   Urinalysis   Result Value Ref Range    Color, UA Yellow Straw/Yellow    Clarity, UA Clear Clear    Glucose, Ur Negative Negative mg/dL    Bilirubin Urine Negative Negative    Ketones, Urine Negative Negative mg/dL    Specific Gravity, UA 1.025 1.005 - 1.030    Blood, Urine Negative Negative    pH, UA 6.0 5.0 - 9.0 Protein, UA Negative Negative mg/dL    Urobilinogen, Urine 0.2 <2.0 E.U./dL    Nitrite, Urine Negative Negative    Leukocyte Esterase, Urine MODERATE (A) Negative   Microscopic Urinalysis   Result Value Ref Range    WBC, UA 5-10 (A) 0 - 5 /HPF    RBC, UA 0-1 0 - 2 /HPF    Bacteria, UA RARE (A) None Seen /HPF       ASSESSMENT/PLAN  Emilee was seen today for 1 month follow-up. Diagnoses and all orders for this visit:    Mild cognitive impairment  -     memantine (NAMENDA) 5 MG tablet; TAKE two TABLETs in the morning and 1 tablet in the evening  -  on aricept. Continue up titrating namenda to 10mg twice a day. Simple chronic bronchitis (HCC)  -     Umeclidinium Bromide (INCRUSE ELLIPTA) 62.5 MCG/INH AEPB; Inhale 1 puff into the lungs daily    Moderate single current episode of major depressive disorder (HCC)   - Continue current dose of zoloft     Decrease claritin to 5mg due to age. Phone/MyChart follow up if tests abnormal.    Return in about 6 weeks (around 11/29/2021). or sooner if necessary. I have reviewed myfindings and recommendations with Emilee. [facility].  Demetrius Brothers MD, M.D

## 2021-10-25 ENCOUNTER — HOSPITAL ENCOUNTER (EMERGENCY)
Age: 86
Discharge: HOME | End: 2021-10-25
Payer: MEDICARE

## 2021-10-25 ENCOUNTER — APPOINTMENT (OUTPATIENT)
Dept: GENERAL RADIOLOGY | Age: 86
End: 2021-10-25
Payer: MEDICARE

## 2021-10-25 VITALS
RESPIRATION RATE: 20 BRPM | OXYGEN SATURATION: 96 % | DIASTOLIC BLOOD PRESSURE: 70 MMHG | HEIGHT: 66 IN | WEIGHT: 123 LBS | HEART RATE: 60 BPM | BODY MASS INDEX: 19.77 KG/M2 | SYSTOLIC BLOOD PRESSURE: 140 MMHG | TEMPERATURE: 97.7 F

## 2021-10-25 DIAGNOSIS — S20.212A CONTUSION OF LEFT CHEST WALL, INITIAL ENCOUNTER: ICD-10-CM

## 2021-10-25 DIAGNOSIS — S09.90XA CLOSED HEAD INJURY, INITIAL ENCOUNTER: Primary | ICD-10-CM

## 2021-10-25 DIAGNOSIS — T07.XXXA MULTIPLE CONTUSIONS: ICD-10-CM

## 2021-10-25 PROCEDURE — 73060 X-RAY EXAM OF HUMERUS: CPT

## 2021-10-25 PROCEDURE — 71101 X-RAY EXAM UNILAT RIBS/CHEST: CPT

## 2021-10-25 PROCEDURE — 99211 OFF/OP EST MAY X REQ PHY/QHP: CPT

## 2021-10-25 ASSESSMENT — PAIN DESCRIPTION - PROGRESSION: CLINICAL_PROGRESSION: NOT CHANGED

## 2021-10-25 ASSESSMENT — PAIN SCALES - GENERAL: PAINLEVEL_OUTOF10: 8

## 2021-10-25 ASSESSMENT — PAIN DESCRIPTION - PAIN TYPE: TYPE: ACUTE PAIN

## 2021-10-25 ASSESSMENT — PAIN DESCRIPTION - DESCRIPTORS: DESCRIPTORS: ACHING;SHARP

## 2021-10-25 ASSESSMENT — PAIN DESCRIPTION - ONSET: ONSET: SUDDEN

## 2021-10-25 ASSESSMENT — PAIN DESCRIPTION - ORIENTATION: ORIENTATION: LEFT

## 2021-10-25 ASSESSMENT — PAIN DESCRIPTION - FREQUENCY: FREQUENCY: CONTINUOUS

## 2021-10-25 NOTE — ED PROVIDER NOTES
Department of Emergency Medicine   [facility]  Provider Note  Admit Date/RoomTime: 10/25/2021  4:53 PM  Room:       NAME: Shary Mcburney  : 1929  MRN: [id number]     Chief Complaint:  Fall (States she was trying to get out of a chair and fell on left side. Having pain in left side of chest, left rib cage, left shoulder and left upper back. Also hit left side of head. Denies LOC.)    History of Present Illness        Stacy Renteria is a 80 y.o. old female who presents to the  for traumatic sharp left, anterior chest, left lateral chest pain which occured 2 hour(s) prior to arrival. The complaint occurred as a result of her losing balance while attempting to stand up from a seated position in the chair tipped over causing her in the chair to fall to her left side. The event was witnessed by her son who indicates there was no loss of consciousness. She was having significant discomfort complaining of left rib pain and upper arm pain. She takes no blood thinners. .. Patient has no prior history of pain/injury with regards to today's visit. Since onset the symptoms have been persistent. Her symptoms are associated with dyspnea. Her pain is aggraveated by breathing, touch and chest wall motion and relieved by nothing. She denies any loss of consciousness, neck pain, abdominal pain, back pain, fever, chills or cough. Tetanus Status: up to date. ROS   Pertinent positives and negatives are stated within HPI, all other systems reviewed and are negative. Past Medical History:  has a past medical history of Asthma, COPD (chronic obstructive pulmonary disease) ([address] Depression, Hyperlipidemia, and Hypertension. Surgical History:  has a past surgical history that includes Hysterectomy and Cataract removal with implant. Social History:  reports that she has never smoked.  She has never used smokeless tobacco. She reports that she does not drink alcohol and does not use drugs. Family History: family history is not on file. Allergies: Demerol hcl [meperidine] and Statins    Physical Exam   Oxygen Saturation Interpretation: Normal.        ED Triage Vitals [10/25/21 1659]   BP Temp Temp Source Pulse Resp SpO2 Height Weight   (!) 140/70 97.7 °F (36.5 °C) Infrared 60 20 96 % 5' 5.5\" (1.664 m) 123 lb (55.8 kg)         Constitutional:  Alert, development consistent with age. HEENT:  NC/NT. Airway patent. Neck:  Normal ROM. Supple. Respiratory:  Clear to auscultation and breath sounds equal.  CV:  Regular rate and rhythm, normal heart sounds, without pathological murmurs, ectopy, gallops, or rubs. Chest:   left lateral chest, substernal area tender to palpation, which does reproduce pain. Crepitance: No.          Skin:  no wounds, erythema, or swelling. GI:  Abdomen Soft, nontender, good bowel sounds. No firm or pulsatile mass. Integument:  Normal turgor. Warm, dry, without visible rash, unless noted elsewhere. Extremities: Mild tenderness to the mid left humerus. Full range of motion. No obvious deformity. Remainder of upper and lower extremities are nontraumatic nontender. Neurological:  Oriented. Motor functions intact. Lab / Imaging Results   (All laboratory and radiology results have been personally reviewed by myself)  Labs:  No results found for this visit on 10/25/21. Imaging: All Radiology results interpreted by Radiologist unless otherwise noted. XR HUMERUS LEFT (MIN 2 VIEWS)   Final Result   No acute osseous abnormality is identified. XR RIBS LEFT INCLUDE CHEST (MIN 3 VIEWS)   Final Result   1. No acute cardiopulmonary process or fracture is identified. 2.  The lungs appear hyperinflated, suggestive of underlying obstructive lung   disease (such as asthma or COPD). 3. Large hiatal hernia.              ED Course / Medical Decision Making   Medications - No data to display       MDM: Imaging was obtained based on moderate suspicion for fracture / bony abnormality, pneumothoraxas per history/physical findings. Plan of Care/Counseling:  Paula Brown reviewed today's visit with the patient and daughter(s) in addition to providing specific details for the plan of care and counseling regarding the diagnosis and prognosis. Questions are answered at this time and are agreeable with the plan. Assessment     1. Closed head injury, initial encounter    2. Multiple contusions    3. Contusion of left chest wall, initial encounter      Plan   Discharged home. Patient condition is stable    New Medications     New Prescriptions    No medications on file     Electronically signed by JOMAR Brown   DD: 10/25/21  **This report was transcribed using voice recognition software. Every effort was made to ensure accuracy; however, inadvertent computerized transcription errors may be present.   END OF ED PROVIDER NOTE       Paula Norman  10/25/21 6921

## 2021-10-26 ENCOUNTER — APPOINTMENT (OUTPATIENT)
Dept: CT IMAGING | Age: 86
End: 2021-10-26
Payer: MEDICARE

## 2021-10-26 ENCOUNTER — HOSPITAL ENCOUNTER (EMERGENCY)
Age: 86
Discharge: HOME | End: 2021-10-26
Attending: EMERGENCY MEDICINE
Payer: MEDICARE

## 2021-10-26 VITALS
OXYGEN SATURATION: 95 % | TEMPERATURE: 97.3 F | HEART RATE: 55 BPM | RESPIRATION RATE: 16 BRPM | SYSTOLIC BLOOD PRESSURE: 178 MMHG | DIASTOLIC BLOOD PRESSURE: 85 MMHG

## 2021-10-26 DIAGNOSIS — K76.9 LIVER LESION, RIGHT LOBE: ICD-10-CM

## 2021-10-26 DIAGNOSIS — S22.42XA CLOSED FRACTURE OF MULTIPLE RIBS OF LEFT SIDE, INITIAL ENCOUNTER: Primary | ICD-10-CM

## 2021-10-26 DIAGNOSIS — R91.1 LUNG NODULE: ICD-10-CM

## 2021-10-26 LAB
ANION GAP SERPL CALCULATED.3IONS-SCNC: 9 MMOL/L (ref 7–16)
BUN BLDV-MCNC: 20 MG/DL (ref 6–23)
CALCIUM SERPL-MCNC: 9 MG/DL (ref 8.6–10.2)
CHLORIDE BLD-SCNC: 103 MMOL/L (ref 98–107)
CO2: 26 MMOL/L (ref 22–29)
CREAT SERPL-MCNC: 0.7 MG/DL (ref 0.5–1)
EKG ATRIAL RATE: 54 BPM
EKG P AXIS: 64 DEGREES
EKG P-R INTERVAL: 140 MS
EKG Q-T INTERVAL: 452 MS
EKG QRS DURATION: 94 MS
EKG QTC CALCULATION (BAZETT): 428 MS
EKG R AXIS: 86 DEGREES
EKG T AXIS: 70 DEGREES
EKG VENTRICULAR RATE: 54 BPM
GFR AFRICAN AMERICAN: >60
GFR NON-AFRICAN AMERICAN: >60 ML/MIN/1.73
GLUCOSE BLD-MCNC: 108 MG/DL (ref 74–99)
HCT VFR BLD CALC: 40.1 % (ref 34–48)
HEMOGLOBIN: 13 G/DL (ref 11.5–15.5)
MCH RBC QN AUTO: 31 PG (ref 26–35)
MCHC RBC AUTO-ENTMCNC: 32.4 % (ref 32–34.5)
MCV RBC AUTO: 95.5 FL (ref 80–99.9)
PDW BLD-RTO: 13.2 FL (ref 11.5–15)
PLATELET # BLD: 135 E9/L (ref 130–450)
PMV BLD AUTO: 11 FL (ref 7–12)
POTASSIUM SERPL-SCNC: 3.4 MMOL/L (ref 3.5–5)
RBC # BLD: 4.2 E12/L (ref 3.5–5.5)
REASON FOR REJECTION: NORMAL
REJECTED TEST: NORMAL
SODIUM BLD-SCNC: 138 MMOL/L (ref 132–146)
TROPONIN, HIGH SENSITIVITY: 8 NG/L (ref 0–9)
WBC # BLD: 6.5 E9/L (ref 4.5–11.5)

## 2021-10-26 PROCEDURE — 99284 EMERGENCY DEPT VISIT MOD MDM: CPT

## 2021-10-26 PROCEDURE — 85027 COMPLETE CBC AUTOMATED: CPT

## 2021-10-26 PROCEDURE — 71250 CT THORAX DX C-: CPT

## 2021-10-26 PROCEDURE — 80048 BASIC METABOLIC PNL TOTAL CA: CPT

## 2021-10-26 PROCEDURE — 6360000002 HC RX W HCPCS: Performed by: EMERGENCY MEDICINE

## 2021-10-26 PROCEDURE — 93005 ELECTROCARDIOGRAM TRACING: CPT | Performed by: EMERGENCY MEDICINE

## 2021-10-26 PROCEDURE — 72125 CT NECK SPINE W/O DYE: CPT

## 2021-10-26 PROCEDURE — 70450 CT HEAD/BRAIN W/O DYE: CPT

## 2021-10-26 PROCEDURE — 84484 ASSAY OF TROPONIN QUANT: CPT

## 2021-10-26 PROCEDURE — 96374 THER/PROPH/DIAG INJ IV PUSH: CPT

## 2021-10-26 RX ORDER — HYDROCODONE BITARTRATE AND ACETAMINOPHEN 5; 325 MG/1; MG/1
1 TABLET ORAL EVERY 6 HOURS PRN
Qty: 12 TABLET | Refills: 0 | Status: SHIPPED | OUTPATIENT
Start: 2021-10-26 | End: 2021-10-28 | Stop reason: SDUPTHER

## 2021-10-26 RX ORDER — MORPHINE SULFATE 2 MG/ML
2 INJECTION, SOLUTION INTRAMUSCULAR; INTRAVENOUS ONCE
Status: COMPLETED | OUTPATIENT
Start: 2021-10-26 | End: 2021-10-26

## 2021-10-26 RX ADMIN — MORPHINE SULFATE 2 MG: 2 INJECTION, SOLUTION INTRAMUSCULAR; INTRAVENOUS at 02:52

## 2021-10-26 ASSESSMENT — PAIN DESCRIPTION - PAIN TYPE: TYPE: ACUTE PAIN

## 2021-10-26 ASSESSMENT — ENCOUNTER SYMPTOMS
SORE THROAT: 0
EYE REDNESS: 0
EYE PAIN: 0
EYE DISCHARGE: 0
WHEEZING: 0
SINUS PRESSURE: 0
BACK PAIN: 0
DIARRHEA: 0
VOMITING: 0
NAUSEA: 0
COUGH: 0
ABDOMINAL DISTENTION: 0
SHORTNESS OF BREATH: 0

## 2021-10-26 ASSESSMENT — PAIN SCALES - GENERAL
PAINLEVEL_OUTOF10: 5
PAINLEVEL_OUTOF10: 10

## 2021-10-26 ASSESSMENT — PAIN DESCRIPTION - LOCATION: LOCATION: RIB CAGE;BACK

## 2021-10-26 ASSESSMENT — PAIN DESCRIPTION - ORIENTATION: ORIENTATION: LEFT

## 2021-10-26 ASSESSMENT — PAIN DESCRIPTION - DESCRIPTORS: DESCRIPTORS: ACHING

## 2021-10-26 NOTE — ED PROVIDER NOTES
Patient is a 81 y/o female who presents to the ED via EMS with left rib pain. Patient states that she fell from a chair yesterday. She states the chair tipped while she was standing up causing her to hit a door and the floor. This occurred approximately 10 hours prior to arrival. She states that she did hit her head but denies any loss of consciousness. Since the fall she has had left sided rib pain. She was seen at Urgent Care and discharged with instructions to take Tylenol for pain. Her pain continued so she took a half of a Percocet which she had at home. She then slept for approximately two hours before waking up in pain. Currently, her pain is 5/10 and worsened by movement and when she takes a breath. Review of Systems   Constitutional: Negative for chills and fever. HENT: Negative for ear pain, sinus pressure and sore throat. Eyes: Negative for pain, discharge and redness. Respiratory: Negative for cough, shortness of breath and wheezing. Cardiovascular: Positive for chest pain. Gastrointestinal: Negative for abdominal distention, diarrhea, nausea and vomiting. Genitourinary: Negative for dysuria and frequency. Musculoskeletal: Negative for arthralgias and back pain. Skin: Negative for rash and wound. Neurological: Negative for weakness and headaches. Hematological: Negative for adenopathy. All other systems reviewed and are negative. Physical Exam  Vitals and nursing note reviewed. Constitutional:       General: She is not in acute distress. HENT:      Head: Normocephalic and atraumatic. Right Ear: External ear normal.      Nose: Nose normal.      Mouth/Throat:      Mouth: Mucous membranes are moist.   Eyes:      Conjunctiva/sclera: Conjunctivae normal.      Pupils: Pupils are equal, round, and reactive to light. Neck:      Comments: No midline cervical tenderness to palpation. Cardiovascular:      Rate and Rhythm: Normal rate and regular rhythm. Heart sounds: No murmur heard. Pulmonary:      Effort: Pulmonary effort is normal. No respiratory distress. Breath sounds: Normal breath sounds. No stridor. No wheezing, rhonchi or rales. Chest:      Chest wall: Tenderness (Left lateral rib tenderness) present. Abdominal:      General: Bowel sounds are normal. There is no distension. Palpations: Abdomen is soft. Tenderness: There is no abdominal tenderness. There is no guarding. Musculoskeletal:      Left shoulder: Normal. No swelling, deformity, tenderness or bony tenderness. Left upper arm: Normal. No swelling, deformity, tenderness or bony tenderness. Left elbow: Normal. No swelling or deformity. No tenderness. Left forearm: Normal. No swelling, deformity, tenderness or bony tenderness. Left wrist: Normal. No swelling, deformity, tenderness or bony tenderness. Skin:     General: Skin is warm and dry. Findings: No rash. Neurological:      Mental Status: She is alert and oriented to person, place, and time. Procedures     MDM         EKG:  Sinus bradycardia with ventricular rate of 54. IN interval, QRS duration and QT interval within normal range. Normal axis. No ST segment abnormalities to suggest acute ischemia. Time: 0405. Re-evaluation. Patients symptoms are improving  Repeat physical examination is not changed  Patient is feeling much better. I have offered her admission for pain control, however, she wants to go home. She has an incentive spirometer at home which she will use.         --------------------------------------------- PAST HISTORY ---------------------------------------------  Past Medical History:  has a past medical history of Asthma, COPD (chronic obstructive pulmonary disease) ([address] Depression, Hyperlipidemia, and Hypertension. Past Surgical History:  has a past surgical history that includes Hysterectomy and Cataract removal with implant.     Social History:  reports that she has never smoked. She has never used smokeless tobacco. She reports that she does not drink alcohol and does not use drugs. Family History: family history is not on file. The patients home medications have been reviewed. Allergies: Demerol hcl [meperidine] and Statins    -------------------------------------------------- RESULTS -------------------------------------------------  Labs:  Results for orders placed or performed during the hospital encounter of 10/26/21   CBC   Result Value Ref Range    WBC 6.5 4.5 - 11.5 E9/L    RBC 4.20 3.50 - 5.50 E12/L    Hemoglobin 13.0 11.5 - 15.5 g/dL    Hematocrit 40.1 34.0 - 48.0 %    MCV 95.5 80.0 - 99.9 fL    MCH 31.0 26.0 - 35.0 pg    MCHC 32.4 32.0 - 34.5 %    RDW 13.2 11.5 - 15.0 fL    Platelets 217 971 - 124 E9/L    MPV 11.0 7.0 - 12.0 fL   SPECIMEN REJECTION   Result Value Ref Range    Rejected Test BMP TROP     Reason for Rejection see below    Troponin   Result Value Ref Range    Troponin, High Sensitivity 8 0 - 9 ng/L   Basic metabolic panel   Result Value Ref Range    Sodium 138 132 - 146 mmol/L    Potassium 3.4 (L) 3.5 - 5.0 mmol/L    Chloride 103 98 - 107 mmol/L    CO2 26 22 - 29 mmol/L    Anion Gap 9 7 - 16 mmol/L    Glucose 108 (H) 74 - 99 mg/dL    BUN 20 6 - 23 mg/dL    CREATININE 0.7 0.5 - 1.0 mg/dL    GFR Non-African American >60 >=60 mL/min/1.73    GFR African American >60     Calcium 9.0 8.6 - 10.2 mg/dL   EKG 12 Lead   Result Value Ref Range    Ventricular Rate 54 BPM    Atrial Rate 54 BPM    P-R Interval 140 ms    QRS Duration 94 ms    Q-T Interval 452 ms    QTc Calculation (Bazett) 428 ms    P Axis 64 degrees    R Axis 86 degrees    T Axis 70 degrees       Radiology:  CT HEAD WO CONTRAST   Final Result   No CT evidence of an acute intracranial abnormality. No evidence of acute fracture or traumatic malalignment of the cervical spine.          CT CERVICAL SPINE WO CONTRAST   Final Result   No CT evidence of an acute intracranial abnormality. No evidence of acute fracture or traumatic malalignment of the cervical spine. CT CHEST WO CONTRAST   Final Result   1. Large hiatal hernia. 2. 6.5 mm nodule in the right lower lobe. If stability cannot be established   by comparison to old studies, 6-12 months follow-up is recommended. Additional follow-up in 18-24 months recommended if patient is high risk for   malignancy. This can also be considered for low risk patient. 3. Mild likely chronic interstitial changes fractures involving 5th through   8th ribs posteriorly on the left. No pneumothorax. 4. 5.8 x 3.8 cm right lobe liver lesion is nonspecific. Malignancy not   excluded. There may be additional lesions in the left lobe. Recommend   further evaluation with post-contrast CT or MRI             ------------------------- NURSING NOTES AND VITALS REVIEWED ---------------------------  Date / Time Roomed:  10/26/2021  1:29 AM  ED Bed Assignment:  [id number]    The nursing notes within the ED encounter and vital signs as below have been reviewed. BP (!) 178/85   Pulse 55   Temp 97.3 °F (36.3 °C) (Temporal)   Resp 16   SpO2 95%   Oxygen Saturation Interpretation: Normal      ------------------------------------------ PROGRESS NOTES ------------------------------------------  I have spoken with the patient and discussed todays results, in addition to providing specific details for the plan of care and counseling regarding the diagnosis and prognosis. Their questions are answered at this time and they are agreeable with the plan. I discussed at length with them reasons for immediate return here for re evaluation. They will followup with primary care by calling their office tomorrow. --------------------------------- ADDITIONAL PROVIDER NOTES ---------------------------------  At this time the patient is without objective evidence of an acute process requiring hospitalization or inpatient management.   They have remained hemodynamically stable throughout their entire ED visit and are stable for discharge with outpatient follow-up. The plan has been discussed in detail and they are aware of the specific conditions for emergent return, as well as the importance of follow-up. New Prescriptions    HYDROCODONE-ACETAMINOPHEN (NORCO) 5-325 MG PER TABLET    Take 1 tablet by mouth every 6 hours as needed for Pain for up to 3 days. Intended supply: 3 days. Take lowest dose possible to manage pain       Diagnosis:  1. Closed fracture of multiple ribs of left side, initial encounter    2. Lung nodule    3. Liver lesion, right lobe        Disposition:  Patient's disposition: Discharge to home  Patient's condition is stable.            Acacia Lakhani,   10/26/21 2922

## 2021-10-27 DIAGNOSIS — S22.42XA CLOSED FRACTURE OF MULTIPLE RIBS OF LEFT SIDE, INITIAL ENCOUNTER: ICD-10-CM

## 2021-10-27 NOTE — TELEPHONE ENCOUNTER
Patient daughter called asking for refill on the norco   (filled 10/26/21 Rx to last 3 days) daughter doesn't want her to run out when the weekend gets here. They are icing the area and resting. States she is taking as directed.  See  ED visit 10/26/2021    Last seen 10/18/2021  Next appt 11/15/2021    [facility] [place]

## 2021-10-28 ENCOUNTER — CARE COORDINATION (OUTPATIENT)
Dept: CARE COORDINATION | Age: 86
End: 2021-10-28

## 2021-10-28 RX ORDER — HYDROCODONE BITARTRATE AND ACETAMINOPHEN 5; 325 MG/1; MG/1
1 TABLET ORAL EVERY 6 HOURS PRN
Qty: 12 TABLET | Refills: 0 | Status: SHIPPED | OUTPATIENT
Start: 2021-10-28 | End: 2021-10-31

## 2021-10-28 NOTE — CARE COORDINATION
Ambulatory Care Coordination Note  10/28/2021  CM Risk Score: 2  Charlson 10 Year Mortality Risk Score: 98%     ACC: Minna Anderson RN    Summary Note: Spoke with pt's daughter, Kylee Bauer. States her mom is doing ok. Still has pain with any movement. Is using spirometer every hour to encourage deep breathing. Started with 1/2 tab of Norco, then went to a full tab every 6 hours, but wears off after 3-4 hours. Would like to know if there is something to give her for breakthrough pain. Daughter is concerned that they will run out over the weekend. They have been applying ice also. Daughter is staying with patient at this time. FOLLOW-UP PLAN:    Discuss:   -Falls? Did pt get pain meds? -COPD Management/Zone Tool    Next Anticipated Outreach by [facility] Care Team:  1 Week          Care Coordination Interventions    Program Enrollment: Complex Care  Referral from Primary Care Provider: No  Suggested Interventions and Community Resources  Fall Risk Prevention: In Process (Comment: Fall Precaution)  Home Health Services: In Process (Comment: Has private aid [facility])  Meals on Wheels: Declined  Medication Assistance Program: [facility] [address] or Pill Pack: Declined  Pharmacist: Declined  Registered Dietician: Declined  Social Work: Declined  Transportation Support: Completed  Zone Management Tools: In Process (Comment: COPD)         Goals Addressed                 This Visit's Progress     Conditions and Symptoms   On track     I will schedule office visits, as directed by my provider. I will keep my appointment or reschedule if I have to cancel. I will notify my provider of any barriers to my plan of care. I will follow my Zone Management tool to seek urgent or emergent care. I will notify my provider of any symptoms that indicate a worsening of my condition.     Barriers: impairment:  Cognitive:  Forgetful  Plan for overcoming my barriers: Daughter, Kylee Bauer, will have open communication with care team  Confidence: 10/10  Anticipated Goal Completion Date: 6/28/21         Medication Management   On track     I will take my medication as directed. I will notify my provider of any problems with medications, like adverse effects or side effects. I will notify my provider/Care Coordinator if I am unable to afford my medications. I will notify my provider for advice before I stop taking any of my medication. Barriers: impairment:  Cognitive   Plan for overcoming my barriers: Daughter, Wolf Roberto, will openly communicate with Care Team  Confidence: 10/10  Anticipated Goal Completion Date: 9/28/21            Prior to Admission medications    Medication Sig Start Date End Date Taking? Authorizing Provider   HYDROcodone-acetaminophen (NORCO) 5-325 MG per tablet Take 1 tablet by mouth every 6 hours as needed for Pain for up to 3 days. Intended supply: 3 days.  Take lowest dose possible to manage pain 10/26/21 10/29/21  Nilson Weldon DO   Umeclidinium Bromide (INCRUSE ELLIPTA) 62.5 MCG/INH AEPB Inhale 1 puff into the lungs daily 10/18/21   Artie Ch MD   memantine (NAMENDA) 5 MG tablet TAKE two TABLETs in the morning and 1 tablet in the evening 10/18/21   Artie Ch MD   LORATADINE PO Take 10 mg by mouth    Historical Provider, MD   sertraline (ZOLOFT) 50 MG tablet Take 1 tablet by mouth daily 9/1/21   Artie Ch MD   alendronate (FOSAMAX) 70 MG tablet Take 1 tablet by mouth every 7 days 7/19/21   Artie Ch MD   omeprazole (PRILOSEC) 20 MG delayed release capsule Take 1 capsule by mouth daily 7/19/21   Artie Ch MD   donepezil (ARICEPT) 5 MG tablet Take 1 tablet by mouth nightly 7/19/21   Artie Ch MD   fluticasone (FLONASE) 50 MCG/ACT nasal spray 1 spray by Each Nostril route daily    Historical Provider, MD   montelukast (SINGULAIR) 10 MG tablet Take 10 mg by mouth nightly    Historical Provider, MD   vitamin B-6 (PYRIDOXINE) 50 MG tablet Take 50 mg by mouth daily Historical Provider, MD   meloxicam (MOBIC) 7.5 MG tablet TAKE ONE TABLET BY MOUTH DAILY 5/11/21   Rojelio Rayo MD   Handicap Placard MISC by Does not apply route Unable to ambulate more than 40 feet without difficulty  Expires 10/14/2025 10/14/20   Rojelio Rayo MD   meclizine (ANTIVERT) 12.5 MG tablet Take 12.5 mg by mouth 3 times daily as needed    Historical Provider, MD   magnesium oxide (MAG-OX) 400 MG tablet Take 400 mg by mouth daily    Historical Provider, MD   Cyanocobalamin (VITAMIN B 12 PO) Take by mouth    Historical Provider, MD   vitamin D (CHOLECALCIFEROL) 1000 UNIT TABS tablet Take 1,000 Units by mouth 2 times daily     Historical Provider, MD   Biotin w/ Vitamins C & E (HAIR/SKIN/NAILS PO) Take by mouth    Historical Provider, MD   Multiple Vitamins-Minerals (MULTIVITAMIN ADULT PO) Take by mouth    Historical Provider, MD       Future Appointments   Date Time Provider Mikaela Denae   11/15/2021 10:30 AM MD Olga Lidia Garces [facility]   11/29/2021  2:00 PM MD Olga Lidia Garces [facility]     ,   COPD Assessment    Does the patient understand envrionmental exposure?: Yes  Is the patient able to verbalize Rescue vs. Long Acting medications?: No  Does the patient have a nebulizer?: No  Does the patient use a space with inhaled medications?: No     No patient-reported symptoms         Symptoms:     Have you had a recent diagnosis of pneumonia either by PCP or at a hospital?: No      and   General Assessment    Do you have any symptoms that are causing concern?: No

## 2021-11-17 ENCOUNTER — VIRTUAL VISIT (OUTPATIENT)
Dept: FAMILY MEDICINE CLINIC | Age: 86
End: 2021-11-17
Payer: MEDICARE

## 2021-11-17 DIAGNOSIS — G30.1 LATE ONSET ALZHEIMER'S DEMENTIA WITHOUT BEHAVIORAL DISTURBANCE (HCC): ICD-10-CM

## 2021-11-17 DIAGNOSIS — R41.3 MEMORY LOSS: Primary | ICD-10-CM

## 2021-11-17 DIAGNOSIS — S22.42XA CLOSED FRACTURE OF MULTIPLE RIBS OF LEFT SIDE, INITIAL ENCOUNTER: ICD-10-CM

## 2021-11-17 DIAGNOSIS — F02.80 LATE ONSET ALZHEIMER'S DEMENTIA WITHOUT BEHAVIORAL DISTURBANCE (HCC): ICD-10-CM

## 2021-11-17 PROCEDURE — 99213 OFFICE O/P EST LOW 20 MIN: CPT | Performed by: FAMILY MEDICINE

## 2021-11-17 PROCEDURE — 1123F ACP DISCUSS/DSCN MKR DOCD: CPT | Performed by: FAMILY MEDICINE

## 2021-11-17 PROCEDURE — 4040F PNEUMOC VAC/ADMIN/RCVD: CPT | Performed by: FAMILY MEDICINE

## 2021-11-17 PROCEDURE — G8427 DOCREV CUR MEDS BY ELIG CLIN: HCPCS | Performed by: FAMILY MEDICINE

## 2021-11-17 PROCEDURE — 1090F PRES/ABSN URINE INCON ASSESS: CPT | Performed by: FAMILY MEDICINE

## 2021-11-17 NOTE — PROGRESS NOTES
TeleMedicine Patient Consent    This visit was performed as a virtual video visit using a synchronous, two-way, audio-video telehealth technology platform. Patient identification was verified at the start of the visit, including the patient's telephone number and physical location. I discussed with the patient the nature of our telehealth visits, that:     1. Due to the nature of an audio- video modality, the only components of a physical exam that could be done are the elements supported by direct observation. 2. I would evaluate the patient and recommend diagnostics and treatments based on my assessment. 3. If it was felt that the patient should be evaluated in clinic or an emergency room setting, then they would be directed there. 4. Our sessions are not being recorded and that personal health information is protected. 5. Our team would provide follow up care in person if/when the patient needs it. Patient does agree to proceed with telemedicine consultation. Patient's location: home address in [facility]  Physician  location other address in [facility] other people involved in call none        Time spent: Greater than Not billed by time    This visit was completed virtually using Doxy. me      This encounter began at:  4:33    This encounter ended at:  4:49    This visit was performed during the 2177 public health crisis and COVID-19 pandemic. *Add 95 modifier to all Video Visits*    CC:    Chief Complaint   Patient presents with    Follow-Up from Hospital         HPI:  80 y.o. female presents no longer having much pain. Has not been moving much. No other falls. Breathing good. Uses oxygen at night. Mild coughing. Has not been able to find records about liver lesion. Forgetting thing more than she was a month ao. Not a lot difference. Got very confused 2-3 nights ago about where she was. Thinking she was at her daughter's house. Did not recognize things     Off of pain medicine now.    Started dual action tylenol and ibuproen twice a day   Last wed or Thursday. Will be having someone come in a couple nights a week when her children cannot be there.        Patient Active Problem List    Diagnosis Date Noted    Hypertension     SV[facility]ra[facility]ricular tachycardia) [facility]     Hyperlipidemia     Memory loss 01/19/2017    Moderate single current episode of major depressive disorder ([facility] 01/19/2017    Abnormal weight loss 01/19/2017    Height loss 01/19/2017    Ventricular ectopy 10/31/2016    Posterior vitreous detachment 02/11/2015    Senile nuclear sclerosis 02/11/2015    Chronic airway obstruction (HCC) 10/20/2011       Current Outpatient Medications on File Prior to Visit   Medication Sig Dispense Refill    Umeclidinium Bromide (INCRUSE ELLIPTA) 62.5 MCG/INH AEPB Inhale 1 puff into the lungs daily 3 each 2    memantine (NAMENDA) 5 MG tablet TAKE two TABLETs in the morning and 1 tablet in the evening 180 tablet 3    LORATADINE PO Take 10 mg by mouth      sertraline (ZOLOFT) 50 MG tablet Take 1 tablet by mouth daily 30 tablet 3    alendronate (FOSAMAX) 70 MG tablet Take 1 tablet by mouth every 7 days 12 tablet 3    omeprazole (PRILOSEC) 20 MG delayed release capsule Take 1 capsule by mouth daily 30 capsule 3    donepezil (ARICEPT) 5 MG tablet Take 1 tablet by mouth nightly 30 tablet 3    fluticasone (FLONASE) 50 MCG/ACT nasal spray 1 spray by Each Nostril route daily      montelukast (SINGULAIR) 10 MG tablet Take 10 mg by mouth nightly      vitamin B-6 (PYRIDOXINE) 50 MG tablet Take 50 mg by mouth daily      Handicap Placard MISC by Does not apply route Unable to ambulate more than 40 feet without difficulty  Expires 10/14/2025 1 each 0    meclizine (ANTIVERT) 12.5 MG tablet Take 12.5 mg by mouth 3 times daily as needed      magnesium oxide (MAG-OX) 400 MG tablet Take 400 mg by mouth daily      Cyanocobalamin (VITAMIN B 12 PO) Take by mouth      vitamin D (CHOLECALCIFEROL) 1000 UNIT TABS tablet Take 1,000 Units by mouth 2 times daily       Biotin w/ Vitamins C & E (HAIR/SKIN/NAILS PO) Take by mouth      Multiple Vitamins-Minerals (MULTIVITAMIN ADULT PO) Take by mouth       No current facility-administered medications on file prior to visit. Allergies   Allergen Reactions    Demerol Hcl [Meperidine]      Other reaction(s): Intolerance  \"went into shok\"    Statins Other (See Comments)       Social History     Tobacco Use    Smoking status: Never Smoker    Smokeless tobacco: Never Used   Vaping Use    Vaping Use: Never used   Substance Use Topics    Alcohol use: No     Comment: 1 cup of coffee a day     Drug use: No       ROS:   Review of Systems - Negative except see HPI     Physical Exam:    General: well appearing   Skin: no rashes noted   Psych:  Mood and affect wnl     Assessments:     Emilee was seen today for follow-up from hospital.    Diagnoses and all orders for this visit:    Memory loss  -     Ron Doss MD, Geriatric Assessment, [place]    Late onset Alzheimer's dementia without behavioral disturbance [facility])  -     Ron Doss MD, Geriatric Assessment, [facility] SPECIALTY [facility] for patient not to be left alone - has home care and aids coming into home when family is not there     Closed fracture of multiple ribs of left side, initial encounter   - Pain improved. No shortness of breath. Orders as above. RTO 2 weeks or sooner prn. Advised to please be adherent to the treatment plans discussed today, and please call with any questions or concerns, letting the office know of any reasons that the plans may not be followed. The risks of untreated conditions include worsening illness, injury, disability, and possibly, death. Please call if symptoms change in any way, worsen, or fail to completely resolve, as this could necessitate a change to treatment plans. Patient and/or caregiver expressed understanding.      Indications and proper use of medication(s) reviewed. Potential side-effects and risks of medication(s) also explained. Patient and/or caregiver was instructed to call if any new symptoms develop prior to next visit. This visit was performed during the 8329 public health crisis and COVID-19 pandemic.   *Add 95 modifier to all Video Visits*

## 2021-11-29 ENCOUNTER — OFFICE VISIT (OUTPATIENT)
Dept: FAMILY MEDICINE CLINIC | Age: 86
End: 2021-11-29
Payer: MEDICARE

## 2021-11-29 VITALS
RESPIRATION RATE: 20 BRPM | HEART RATE: 65 BPM | SYSTOLIC BLOOD PRESSURE: 120 MMHG | OXYGEN SATURATION: 96 % | DIASTOLIC BLOOD PRESSURE: 70 MMHG | WEIGHT: 122 LBS | BODY MASS INDEX: 19.61 KG/M2 | HEIGHT: 66 IN | TEMPERATURE: 96.3 F

## 2021-11-29 DIAGNOSIS — R53.82 CHRONIC FATIGUE: ICD-10-CM

## 2021-11-29 DIAGNOSIS — R06.02 SHORTNESS OF BREATH: Primary | ICD-10-CM

## 2021-11-29 DIAGNOSIS — G31.84 MILD COGNITIVE IMPAIRMENT: ICD-10-CM

## 2021-11-29 LAB
ANION GAP SERPL CALCULATED.3IONS-SCNC: 11 MMOL/L (ref 7–16)
BASOPHILS ABSOLUTE: 0.01 E9/L (ref 0–0.2)
BASOPHILS RELATIVE PERCENT: 0.2 % (ref 0–2)
BUN BLDV-MCNC: 13 MG/DL (ref 6–23)
CALCIUM SERPL-MCNC: 9.9 MG/DL (ref 8.6–10.2)
CHLORIDE BLD-SCNC: 99 MMOL/L (ref 98–107)
CO2: 28 MMOL/L (ref 22–29)
CREAT SERPL-MCNC: 0.7 MG/DL (ref 0.5–1)
EOSINOPHILS ABSOLUTE: 0.02 E9/L (ref 0.05–0.5)
EOSINOPHILS RELATIVE PERCENT: 0.4 % (ref 0–6)
GFR AFRICAN AMERICAN: >60
GFR NON-AFRICAN AMERICAN: >60 ML/MIN/1.73
GLUCOSE BLD-MCNC: 94 MG/DL (ref 74–99)
HCT VFR BLD CALC: 42.8 % (ref 34–48)
HEMOGLOBIN: 13.7 G/DL (ref 11.5–15.5)
IMMATURE GRANULOCYTES #: 0.02 E9/L
IMMATURE GRANULOCYTES %: 0.4 % (ref 0–5)
LYMPHOCYTES ABSOLUTE: 1.35 E9/L (ref 1.5–4)
LYMPHOCYTES RELATIVE PERCENT: 25.4 % (ref 20–42)
MCH RBC QN AUTO: 30.3 PG (ref 26–35)
MCHC RBC AUTO-ENTMCNC: 32 % (ref 32–34.5)
MCV RBC AUTO: 94.7 FL (ref 80–99.9)
MONOCYTES ABSOLUTE: 0.52 E9/L (ref 0.1–0.95)
MONOCYTES RELATIVE PERCENT: 9.8 % (ref 2–12)
NEUTROPHILS ABSOLUTE: 3.4 E9/L (ref 1.8–7.3)
NEUTROPHILS RELATIVE PERCENT: 63.8 % (ref 43–80)
PDW BLD-RTO: 13.2 FL (ref 11.5–15)
PLATELET # BLD: 136 E9/L (ref 130–450)
PMV BLD AUTO: 11.8 FL (ref 7–12)
POTASSIUM SERPL-SCNC: 3.7 MMOL/L (ref 3.5–5)
RBC # BLD: 4.52 E12/L (ref 3.5–5.5)
SODIUM BLD-SCNC: 138 MMOL/L (ref 132–146)
TSH SERPL DL<=0.05 MIU/L-ACNC: 1.54 UIU/ML (ref 0.27–4.2)
WBC # BLD: 5.3 E9/L (ref 4.5–11.5)

## 2021-11-29 PROCEDURE — 90694 VACC AIIV4 NO PRSRV 0.5ML IM: CPT | Performed by: FAMILY MEDICINE

## 2021-11-29 PROCEDURE — 1123F ACP DISCUSS/DSCN MKR DOCD: CPT | Performed by: FAMILY MEDICINE

## 2021-11-29 PROCEDURE — G8420 CALC BMI NORM PARAMETERS: HCPCS | Performed by: FAMILY MEDICINE

## 2021-11-29 PROCEDURE — 4040F PNEUMOC VAC/ADMIN/RCVD: CPT | Performed by: FAMILY MEDICINE

## 2021-11-29 PROCEDURE — G8427 DOCREV CUR MEDS BY ELIG CLIN: HCPCS | Performed by: FAMILY MEDICINE

## 2021-11-29 PROCEDURE — 99214 OFFICE O/P EST MOD 30 MIN: CPT | Performed by: FAMILY MEDICINE

## 2021-11-29 PROCEDURE — G8484 FLU IMMUNIZE NO ADMIN: HCPCS | Performed by: FAMILY MEDICINE

## 2021-11-29 PROCEDURE — 1090F PRES/ABSN URINE INCON ASSESS: CPT | Performed by: FAMILY MEDICINE

## 2021-11-29 PROCEDURE — G0008 ADMIN INFLUENZA VIRUS VAC: HCPCS | Performed by: FAMILY MEDICINE

## 2021-11-29 PROCEDURE — 1036F TOBACCO NON-USER: CPT | Performed by: FAMILY MEDICINE

## 2021-11-29 ASSESSMENT — ENCOUNTER SYMPTOMS
WHEEZING: 0
COUGH: 0
ABDOMINAL PAIN: 0
SHORTNESS OF BREATH: 0

## 2021-11-29 NOTE — PROGRESS NOTES
[address] [facility]  [phone]   Lm Diana MD     Patient: Alexsander Beckett  YOB: 1929  Visit Date: 11/29/21    Aarti Jesus is a 80y.o. year old female here today for   Chief Complaint   Patient presents with    Check-Up     6 week follow up       HPI  80year old female here to discuss concerns for dementia and increasing fatigue. Very tired   Coughing and aching. No fevers, no nausea or vomiting. Thinks this is a change in the last week or two. Hips are hurting. Moving around ok. Hurts all over for the past week. Last night had an episode where chest hurt bad and was coughing. Cough is dry. Was having some heart palpitations. Not bringing up anything with cough. Nose is running mild   Did well the first 3 weeks after she fell. Losing her balance. No toehr falls. Has not noticed a difference with the medications. Has trouble sometimes getting to sleep. Takes the dual action advil. Review of Systems   Constitutional: Negative for chills and fever. Respiratory: Negative for cough, shortness of breath and wheezing. Cardiovascular: Negative for chest pain, palpitations and leg swelling. Gastrointestinal: Negative for abdominal pain. Neurological: Negative for dizziness and light-headedness.        Current Outpatient Medications on File Prior to Visit   Medication Sig Dispense Refill    Umeclidinium Bromide (INCRUSE ELLIPTA) 62.5 MCG/INH AEPB Inhale 1 puff into the lungs daily 3 each 2    memantine (NAMENDA) 5 MG tablet TAKE two TABLETs in the morning and 1 tablet in the evening (Patient taking differently: 10 mg Twice a day) 180 tablet 3    LORATADINE PO Take 5 mg by mouth daily       sertraline (ZOLOFT) 50 MG tablet Take 1 tablet by mouth daily 30 tablet 3    alendronate (FOSAMAX) 70 MG tablet Take 1 tablet by mouth every 7 days 12 tablet 3    omeprazole (PRILOSEC) 20 MG delayed release capsule Take 1 stridor. No wheezing or rales. Abdominal:      General: Bowel sounds are normal. There is no distension. Palpations: Abdomen is soft. There is no mass. Tenderness: There is no abdominal tenderness. There is no guarding or rebound. Musculoskeletal:         General: Normal range of motion. Right lower leg: No edema. Left lower leg: No edema. Results for orders placed or performed during the hospital encounter of 10/26/21   CBC   Result Value Ref Range    WBC 6.5 4.5 - 11.5 E9/L    RBC 4.20 3.50 - 5.50 E12/L    Hemoglobin 13.0 11.5 - 15.5 g/dL    Hematocrit 40.1 34.0 - 48.0 %    MCV 95.5 80.0 - 99.9 fL    MCH 31.0 26.0 - 35.0 pg    MCHC 32.4 32.0 - 34.5 %    RDW 13.2 11.5 - 15.0 fL    Platelets 451 074 - 570 E9/L    MPV 11.0 7.0 - 12.0 fL   SPECIMEN REJECTION   Result Value Ref Range    Rejected Test BMP TROP     Reason for Rejection see below    Troponin   Result Value Ref Range    Troponin, High Sensitivity 8 0 - 9 ng/L   Basic metabolic panel   Result Value Ref Range    Sodium 138 132 - 146 mmol/L    Potassium 3.4 (L) 3.5 - 5.0 mmol/L    Chloride 103 98 - 107 mmol/L    CO2 26 22 - 29 mmol/L    Anion Gap 9 7 - 16 mmol/L    Glucose 108 (H) 74 - 99 mg/dL    BUN 20 6 - 23 mg/dL    CREATININE 0.7 0.5 - 1.0 mg/dL    GFR Non-African American >60 >=60 mL/min/1.73    GFR African American >60     Calcium 9.0 8.6 - 10.2 mg/dL   EKG 12 Lead   Result Value Ref Range    Ventricular Rate 54 BPM    Atrial Rate 54 BPM    P-R Interval 140 ms    QRS Duration 94 ms    Q-T Interval 452 ms    QTc Calculation (Bazett) 428 ms    P Axis 64 degrees    R Axis 86 degrees    T Axis 70 degrees       ASSESSMENT/PLAN  Emilee was seen today for check-up. Diagnoses and all orders for this visit:    Shortness of breath  -     XR CHEST STANDARD (2 VW); Future  Rule out pneumonia given recent rib fractures. Chronic fatigue  -     Basic Metabolic Panel;  Future  -     CBC Auto Differential; Future  -     TSH; Future    Mild cognitive impairment    Has appointment scheduled with Dr. Alec Corrigan. Currently on namenda and aricept. -     INFLUENZA, QUADV, ADJUVANTED, 65 YRS =, IM, PF, PREFILL SYR, 0.5ML (FLUAD)            Phone/MyChart follow up if tests abnormal.    Return in about 6 weeks (around 1/10/2022). or sooner if necessary. I have reviewed myfindings and recommendations with Emilee. Cyndi Echavarria.  Gabriel Rayo MD, M.D

## 2021-11-30 ENCOUNTER — CARE COORDINATION (OUTPATIENT)
Dept: CARE COORDINATION | Age: 86
End: 2021-11-30

## 2021-11-30 ENCOUNTER — TELEPHONE (OUTPATIENT)
Dept: FAMILY MEDICINE CLINIC | Age: 86
End: 2021-11-30

## 2021-11-30 ASSESSMENT — ENCOUNTER SYMPTOMS: DYSPNEA ASSOCIATED WITH: EXERTION

## 2021-11-30 NOTE — TELEPHONE ENCOUNTER
----- Message from Jessica Allen MD sent at 11/30/2021  3:35 PM EST -----  No sign of pneumonia. Does have signs of COPD and a large hiatal hernia which could be causing shortness of breath.      bloodwork wnl

## 2021-11-30 NOTE — CARE COORDINATION
Ambulatory Care Coordination Note  11/30/2021   CM Risk Score: 6  Charlson 10 Year Mortality Risk Score: 98%     ACC: Toby Adhikari RN    Summary Note: Denies s/s of COPD Exacerbation. Discussed the Zone Tool and verbalizes understanding. Pt's daughter states pt continues to have pain throughout body, and states she discussed that with PCP yesterday. Had labs and CXR done yesterday. FOLLOW-UP PLAN:    Discuss:   -Pain  -COPD Management/Zone Tool  -Appointment Reminders    Next Anticipated Outreach by [address] Team:  3 Weeks          Care Coordination Interventions    Program Enrollment: Complex Care  Referral from Primary Care Provider: No  Suggested Interventions and Community Resources  Fall Risk Prevention: In Process (Comment: Fall Precaution)  Home Health Services: In Process (Comment: Has private aid M and W)  Meals on Wheels: Declined  Medication Assistance Program: [facility] [address] or Pill Pack: Declined  Pharmacist: Declined  Registered Dietician: Declined  Social Work: Declined  Transportation Support: Completed  Zone Management Tools: In Process (Comment: COPD)         Goals Addressed                 This Visit's Progress     Conditions and Symptoms   On track     I will schedule office visits, as directed by my provider. I will keep my appointment or reschedule if I have to cancel. I will notify my provider of any barriers to my plan of care. I will follow my Zone Management tool to seek urgent or emergent care. I will notify my provider of any symptoms that indicate a worsening of my condition. Barriers: impairment:  Cognitive:  Forgetful  Plan for overcoming my barriers: Daughter, Donna Suh, will have open communication with care team  Confidence: 10/10  Anticipated Goal Completion Date: 6/28/21         Medication Management   On track     I will take my medication as directed. I will notify my provider of any problems with medications, like adverse effects or side effects.   I will notify my provider/Care Coordinator if I am unable to afford my medications. I will notify my provider for advice before I stop taking any of my medication. Barriers: impairment:  Cognitive   Plan for overcoming my barriers: Daughter, Juana Schwartz, will openly communicate with Care Team  Confidence: 10/10  Anticipated Goal Completion Date: 9/28/21            Prior to Admission medications    Medication Sig Start Date End Date Taking?  Authorizing Provider   Umeclidinium Bromide (INCRUSE ELLIPTA) 62.5 MCG/INH AEPB Inhale 1 puff into the lungs daily 10/18/21   Pranay Escamilla MD   memantine (NAMENDA) 5 MG tablet TAKE two TABLETs in the morning and 1 tablet in the evening  Patient taking differently: 10 mg Twice a day 10/18/21   Pranay Escamilla MD   LORATADINE PO Take 10 mg by mouth    Historical Provider, MD   sertraline (ZOLOFT) 50 MG tablet Take 1 tablet by mouth daily 9/1/21   Pranay Escamilla MD   alendronate (FOSAMAX) 70 MG tablet Take 1 tablet by mouth every 7 days 7/19/21   Pranay Escamilla MD   omeprazole (PRILOSEC) 20 MG delayed release capsule Take 1 capsule by mouth daily 7/19/21   Pranay Escamilla MD   donepezil (ARICEPT) 5 MG tablet Take 1 tablet by mouth nightly 7/19/21   Pranay Escamilla MD   fluticasone (FLONASE) 50 MCG/ACT nasal spray 1 spray by Each Nostril route daily    Historical Provider, MD   montelukast (SINGULAIR) 10 MG tablet Take 10 mg by mouth nightly    Historical Provider, MD   vitamin B-6 (PYRIDOXINE) 50 MG tablet Take 50 mg by mouth daily    Historical Provider, MD   Handicap Placard MISC by Does not apply route Unable to ambulate more than 40 feet without difficulty  Expires 10/14/2025 10/14/20   Pranay Escamilla MD   meclizine (ANTIVERT) 12.5 MG tablet Take 12.5 mg by mouth 3 times daily as needed    Historical Provider, MD   magnesium oxide (MAG-OX) 400 MG tablet Take 400 mg by mouth daily    Historical Provider, MD   Cyanocobalamin (VITAMIN B 12 PO) Take by mouth    Historical Provider, MD   vitamin D (CHOLECALCIFEROL) 1000 UNIT TABS tablet Take 1,000 Units by mouth 2 times daily     Historical Provider, MD   Biotin w/ Vitamins C & E (HAIR/SKIN/NAILS PO) Take by mouth    Historical Provider, MD   Multiple Vitamins-Minerals (MULTIVITAMIN ADULT PO) Take by mouth    Historical Provider, MD       Future Appointments   Date Time Provider Mikaela Phillipi   1/10/2022 11:15 AM Pranay Escamilla MD [facility]   2/8/2022  3:00 PM Antoinette Cruz MD [facility]      and   COPD Assessment    Does the patient understand envrionmental exposure?: Yes  Is the patient able to verbalize Rescue vs. Long Acting medications?: No  Does the patient have a nebulizer?: No  Does the patient use a space with inhaled medications?: No     No patient-reported symptoms         Symptoms:  None: Yes      Symptom course: stable  Breathlessness: exertion  Increase use of rapid acting/rescue inhaled medications?: No  Change in chronic cough?: No/At Baseline  Change in sputum?: No/At Baseline

## 2021-12-01 ENCOUNTER — INITIAL CONSULT (OUTPATIENT)
Dept: GERIATRIC MEDICINE | Age: 86
End: 2021-12-01
Payer: MEDICARE

## 2021-12-01 VITALS
DIASTOLIC BLOOD PRESSURE: 72 MMHG | TEMPERATURE: 97.1 F | BODY MASS INDEX: 19.24 KG/M2 | RESPIRATION RATE: 16 BRPM | WEIGHT: 119.7 LBS | HEART RATE: 67 BPM | HEIGHT: 66 IN | SYSTOLIC BLOOD PRESSURE: 116 MMHG

## 2021-12-01 DIAGNOSIS — F02.80 ALZHEIMER DISEASE (HCC): Primary | ICD-10-CM

## 2021-12-01 DIAGNOSIS — G30.9 ALZHEIMER DISEASE (HCC): Primary | ICD-10-CM

## 2021-12-01 PROCEDURE — 1090F PRES/ABSN URINE INCON ASSESS: CPT | Performed by: INTERNAL MEDICINE

## 2021-12-01 PROCEDURE — 1123F ACP DISCUSS/DSCN MKR DOCD: CPT | Performed by: INTERNAL MEDICINE

## 2021-12-01 PROCEDURE — G8420 CALC BMI NORM PARAMETERS: HCPCS | Performed by: INTERNAL MEDICINE

## 2021-12-01 PROCEDURE — G8484 FLU IMMUNIZE NO ADMIN: HCPCS | Performed by: INTERNAL MEDICINE

## 2021-12-01 PROCEDURE — 99213 OFFICE O/P EST LOW 20 MIN: CPT | Performed by: INTERNAL MEDICINE

## 2021-12-01 PROCEDURE — 4040F PNEUMOC VAC/ADMIN/RCVD: CPT | Performed by: INTERNAL MEDICINE

## 2021-12-01 PROCEDURE — G8427 DOCREV CUR MEDS BY ELIG CLIN: HCPCS | Performed by: INTERNAL MEDICINE

## 2021-12-01 PROCEDURE — 99212 OFFICE O/P EST SF 10 MIN: CPT | Performed by: INTERNAL MEDICINE

## 2021-12-01 PROCEDURE — 1036F TOBACCO NON-USER: CPT | Performed by: INTERNAL MEDICINE

## 2021-12-01 RX ORDER — MIRTAZAPINE 7.5 MG/1
7.5 TABLET, FILM COATED ORAL NIGHTLY
Qty: 30 TABLET | Refills: 5 | Status: SHIPPED
Start: 2021-12-01 | End: 2022-02-21 | Stop reason: ALTCHOICE

## 2021-12-01 RX ORDER — ANTIARTHRITIC COMBINATION NO.2 900 MG
1 TABLET ORAL DAILY
COMMUNITY
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
## 2021-12-01 NOTE — PROGRESS NOTES
Chief Complaint   Patient presents with    Consultation     Referral from PCP, Dr Aleja Ramirez, re: dementia. Pt states she lives alone, daughter near -by and visits daily, great grandson lives in pt's basement but not often there, per pt. Here with daughter, Johnny Branham. Pt states she gets confused, daughter states pt has difficulty remembering if she has taken her meds or has eaten. Memory has gotten worse the past 6 months. Fatigue & memory issues are pt's biggest concerns.  Fatigue     Tired and has generalilzed aching - worse for the past 6 months.  Chest Pain     Intermittent chest pain x 1 week. Dry cough in the past 2 - 3 days, per daughter. States pt had a CXR & labs on Monday. Has seen cardiology in the past for \"intermittent heart racing\". Per daughter, pt fell getting up from the table 6 weeks ago & fractured 4 ribs. Son was with her at the time.  Blood Pressure Check     152/78. Repeat 116/72.  Depression     Per daughter, pt has been on 3 different antidepressants since her   in 2018. Above discussed with Dr Gerry Osler prior to his seeing the pt.

## 2021-12-21 ENCOUNTER — CARE COORDINATION (OUTPATIENT)
Dept: CARE COORDINATION | Age: 86
End: 2021-12-21

## 2021-12-21 NOTE — CARE COORDINATION
Left HIPAA compliant message for patient to return call to [phone].   Re: Follow up: COPD, Pain?, Review POC

## 2021-12-31 ENCOUNTER — PATIENT MESSAGE (OUTPATIENT)
Dept: FAMILY MEDICINE CLINIC | Age: 86
End: 2021-12-31

## 2021-12-31 DIAGNOSIS — F32.1 MODERATE SINGLE CURRENT EPISODE OF MAJOR DEPRESSIVE DISORDER (HCC): ICD-10-CM

## 2022-01-03 RX ORDER — DONEPEZIL HYDROCHLORIDE 5 MG/1
5 TABLET, FILM COATED ORAL NIGHTLY
Qty: 30 TABLET | Refills: 3 | Status: SHIPPED
Start: 2022-01-03 | End: 2022-02-15 | Stop reason: SDUPTHER

## 2022-01-03 RX ORDER — OMEPRAZOLE 20 MG/1
20 CAPSULE, DELAYED RELEASE ORAL DAILY
Qty: 30 CAPSULE | Refills: 3 | Status: SHIPPED
Start: 2022-01-03 | End: 2022-04-01

## 2022-01-03 NOTE — TELEPHONE ENCOUNTER
From: Jimena Means  To: Dr. Fermin Kumar: 12/31/2021 3:30 PM EST  Subject: Refills for 90 days     Good day Dr Vero Edmond and happy new year to you I am hope this finds you and your family well  Jimena Kirkchristi birthdate [phone] has switched to [address] on [facility] in [facility] and I would like to request refills be sent there and if possible to refill them for 90 days also can she please go back on the meloxicam we have found that medication and Tylenol seem to help her arthritis and achy feeling much better  I greatly appreciate your time and consideration and again hope you have had a happy and relaxing holiday season

## 2022-01-04 RX ORDER — MELOXICAM 7.5 MG/1
7.5 TABLET ORAL DAILY PRN
Qty: 90 TABLET | Refills: 0 | Status: SHIPPED
Start: 2022-01-04 | End: 2022-04-05

## 2022-01-04 RX ORDER — MELOXICAM 7.5 MG/1
7.5 TABLET ORAL DAILY PRN
Qty: 30 TABLET | Refills: 0 | Status: SHIPPED
Start: 2022-01-04 | End: 2022-01-04 | Stop reason: SDUPTHER

## 2022-01-18 ENCOUNTER — CARE COORDINATION (OUTPATIENT)
Dept: CARE COORDINATION | Age: 87
End: 2022-01-18

## 2022-01-25 ENCOUNTER — CARE COORDINATION (OUTPATIENT)
Dept: CARE COORDINATION | Age: 87
End: 2022-01-25

## 2022-02-08 ENCOUNTER — OFFICE VISIT (OUTPATIENT)
Dept: GERIATRIC MEDICINE | Age: 87
End: 2022-02-08
Payer: MEDICARE

## 2022-02-08 VITALS
BODY MASS INDEX: 20.21 KG/M2 | WEIGHT: 125.2 LBS | DIASTOLIC BLOOD PRESSURE: 70 MMHG | RESPIRATION RATE: 16 BRPM | SYSTOLIC BLOOD PRESSURE: 120 MMHG | TEMPERATURE: 97.6 F | HEART RATE: 60 BPM

## 2022-02-08 DIAGNOSIS — G30.9 ALZHEIMER DISEASE (HCC): Primary | ICD-10-CM

## 2022-02-08 DIAGNOSIS — F32.1 MODERATE SINGLE CURRENT EPISODE OF MAJOR DEPRESSIVE DISORDER (HCC): ICD-10-CM

## 2022-02-08 DIAGNOSIS — I47.1 SVT (SUPRAVENTRICULAR TACHYCARDIA) (HCC): ICD-10-CM

## 2022-02-08 DIAGNOSIS — J41.0 SIMPLE CHRONIC BRONCHITIS (HCC): ICD-10-CM

## 2022-02-08 DIAGNOSIS — F02.80 ALZHEIMER DISEASE (HCC): Primary | ICD-10-CM

## 2022-02-08 PROCEDURE — 3023F SPIROM DOC REV: CPT | Performed by: INTERNAL MEDICINE

## 2022-02-08 PROCEDURE — 4040F PNEUMOC VAC/ADMIN/RCVD: CPT | Performed by: INTERNAL MEDICINE

## 2022-02-08 PROCEDURE — 1036F TOBACCO NON-USER: CPT | Performed by: INTERNAL MEDICINE

## 2022-02-08 PROCEDURE — 99212 OFFICE O/P EST SF 10 MIN: CPT | Performed by: INTERNAL MEDICINE

## 2022-02-08 PROCEDURE — G8420 CALC BMI NORM PARAMETERS: HCPCS | Performed by: INTERNAL MEDICINE

## 2022-02-08 PROCEDURE — G8484 FLU IMMUNIZE NO ADMIN: HCPCS | Performed by: INTERNAL MEDICINE

## 2022-02-08 PROCEDURE — G8427 DOCREV CUR MEDS BY ELIG CLIN: HCPCS | Performed by: INTERNAL MEDICINE

## 2022-02-08 PROCEDURE — 1090F PRES/ABSN URINE INCON ASSESS: CPT | Performed by: INTERNAL MEDICINE

## 2022-02-08 PROCEDURE — 1123F ACP DISCUSS/DSCN MKR DOCD: CPT | Performed by: INTERNAL MEDICINE

## 2022-02-08 RX ORDER — MIRTAZAPINE 15 MG/1
15 TABLET, ORALLY DISINTEGRATING ORAL NIGHTLY
Qty: 90 TABLET | Refills: 3 | Status: CANCELLED | OUTPATIENT
Start: 2022-02-08 | End: 2023-02-08

## 2022-02-08 RX ORDER — MIRTAZAPINE 15 MG/1
15 TABLET, FILM COATED ORAL NIGHTLY
Qty: 90 TABLET | Refills: 3 | Status: SHIPPED
Start: 2022-02-08 | End: 2022-09-28 | Stop reason: SDUPTHER

## 2022-02-08 ASSESSMENT — PATIENT HEALTH QUESTIONNAIRE - PHQ9
7. TROUBLE CONCENTRATING ON THINGS, SUCH AS READING THE NEWSPAPER OR WATCHING TELEVISION: 0
10. IF YOU CHECKED OFF ANY PROBLEMS, HOW DIFFICULT HAVE THESE PROBLEMS MADE IT FOR YOU TO DO YOUR WORK, TAKE CARE OF THINGS AT HOME, OR GET ALONG WITH OTHER PEOPLE: 0
2. FEELING DOWN, DEPRESSED OR HOPELESS: 0
9. THOUGHTS THAT YOU WOULD BE BETTER OFF DEAD, OR OF HURTING YOURSELF: 0
SUM OF ALL RESPONSES TO PHQ QUESTIONS 1-9: 0
3. TROUBLE FALLING OR STAYING ASLEEP: 0
SUM OF ALL RESPONSES TO PHQ QUESTIONS 1-9: 0
6. FEELING BAD ABOUT YOURSELF - OR THAT YOU ARE A FAILURE OR HAVE LET YOURSELF OR YOUR FAMILY DOWN: 0
SUM OF ALL RESPONSES TO PHQ QUESTIONS 1-9: 0
4. FEELING TIRED OR HAVING LITTLE ENERGY: 0
SUM OF ALL RESPONSES TO PHQ QUESTIONS 1-9: 0
SUM OF ALL RESPONSES TO PHQ9 QUESTIONS 1 & 2: 0
1. LITTLE INTEREST OR PLEASURE IN DOING THINGS: 0
SUM OF ALL RESPONSES TO PHQ QUESTIONS 1-9: 0
8. MOVING OR SPEAKING SO SLOWLY THAT OTHER PEOPLE COULD HAVE NOTICED. OR THE OPPOSITE, BEING SO FIGETY OR RESTLESS THAT YOU HAVE BEEN MOVING AROUND A LOT MORE THAN USUAL: 0
SUM OF ALL RESPONSES TO PHQ QUESTIONS 1-9: 0
DEPRESSION UNABLE TO ASSESS: PT REFUSES
SUM OF ALL RESPONSES TO PHQ QUESTIONS 1-9: 0
5. POOR APPETITE OR OVEREATING: 0
2. FEELING DOWN, DEPRESSED OR HOPELESS: 0
1. LITTLE INTEREST OR PLEASURE IN DOING THINGS: 0
2. FEELING DOWN, DEPRESSED OR HOPELESS: 0
SUM OF ALL RESPONSES TO PHQ9 QUESTIONS 1 & 2: 0
SUM OF ALL RESPONSES TO PHQ QUESTIONS 1-9: 0
SUM OF ALL RESPONSES TO PHQ QUESTIONS 1-9: 0

## 2022-02-08 NOTE — PROGRESS NOTES
Here with daughter today  Cognitive on rolling coaster  Lots of issues   And Repeating to read newspaper for date  rummaging through drawers   More anxious that she cannot remember  Dicketan Oas lives with her   She will not wander  No cooking and microwave usage   Family dooing IADL's   Makes grocery list  ADL's independent   Sponge bath daily and Showers with supervision  Sleeps at night  DGS and on mirtazepine   Very weary   On Mobic again   Took care of  5 years ago from strokes.  TIA etc  Impression: Slowly progressive SDAT  Plan: Aricept 5 mg and NAmenda 5 mg             Zoloft 50 mg and Mirtazepine 15 mg hs

## 2022-02-15 ENCOUNTER — CARE COORDINATION (OUTPATIENT)
Dept: CARE COORDINATION | Age: 87
End: 2022-02-15

## 2022-02-15 DIAGNOSIS — F32.1 MODERATE SINGLE CURRENT EPISODE OF MAJOR DEPRESSIVE DISORDER (HCC): ICD-10-CM

## 2022-02-15 DIAGNOSIS — M81.0 AGE-RELATED OSTEOPOROSIS WITHOUT CURRENT PATHOLOGICAL FRACTURE: ICD-10-CM

## 2022-02-15 DIAGNOSIS — G31.84 MILD COGNITIVE IMPAIRMENT: ICD-10-CM

## 2022-02-15 NOTE — LETTER
2/15/2022    Emilee Maciel  [address] [facility] [address]    [place]     I have enjoyed working with you to improve your health. I would like to continue to provide you support. However, I have been unable to reach you at, [phone] (home)    Please let me know if I can answer any questions. If you would like continued access to your Nurse Care Coordinator, Aubrie Henson RN, you can reach me at [phone]. I will wait to hear from you and will no longer reach out to you. Elana Raygoza. Linda Orr MD and his/her team will continue to provide care and be available for questions.       In good health,     Aubrie Henson RN

## 2022-02-17 RX ORDER — DONEPEZIL HYDROCHLORIDE 5 MG/1
5 TABLET, FILM COATED ORAL NIGHTLY
Qty: 30 TABLET | Refills: 3 | Status: SHIPPED
Start: 2022-02-17 | End: 2022-08-29

## 2022-02-17 RX ORDER — ALENDRONATE SODIUM 70 MG/1
70 TABLET ORAL
Qty: 12 TABLET | Refills: 3 | Status: SHIPPED
Start: 2022-02-17 | End: 2022-09-28 | Stop reason: SDUPTHER

## 2022-02-17 RX ORDER — MEMANTINE HYDROCHLORIDE 5 MG/1
TABLET ORAL
Qty: 180 TABLET | Refills: 3 | Status: SHIPPED
Start: 2022-02-17 | End: 2022-06-21

## 2022-02-21 ENCOUNTER — OFFICE VISIT (OUTPATIENT)
Dept: FAMILY MEDICINE CLINIC | Age: 87
End: 2022-02-21
Payer: MEDICARE

## 2022-02-21 VITALS
RESPIRATION RATE: 18 BRPM | DIASTOLIC BLOOD PRESSURE: 72 MMHG | SYSTOLIC BLOOD PRESSURE: 126 MMHG | OXYGEN SATURATION: 96 % | BODY MASS INDEX: 20.41 KG/M2 | WEIGHT: 127 LBS | HEART RATE: 64 BPM | HEIGHT: 66 IN

## 2022-02-21 DIAGNOSIS — G30.1 LATE ONSET ALZHEIMER'S DEMENTIA WITHOUT BEHAVIORAL DISTURBANCE (HCC): Primary | ICD-10-CM

## 2022-02-21 DIAGNOSIS — R52 BODY ACHES: ICD-10-CM

## 2022-02-21 DIAGNOSIS — F32.1 MODERATE SINGLE CURRENT EPISODE OF MAJOR DEPRESSIVE DISORDER (HCC): ICD-10-CM

## 2022-02-21 DIAGNOSIS — R53.82 CHRONIC FATIGUE: ICD-10-CM

## 2022-02-21 DIAGNOSIS — F02.80 LATE ONSET ALZHEIMER'S DEMENTIA WITHOUT BEHAVIORAL DISTURBANCE (HCC): Primary | ICD-10-CM

## 2022-02-21 PROCEDURE — 1123F ACP DISCUSS/DSCN MKR DOCD: CPT | Performed by: FAMILY MEDICINE

## 2022-02-21 PROCEDURE — G8484 FLU IMMUNIZE NO ADMIN: HCPCS | Performed by: FAMILY MEDICINE

## 2022-02-21 PROCEDURE — G8420 CALC BMI NORM PARAMETERS: HCPCS | Performed by: FAMILY MEDICINE

## 2022-02-21 PROCEDURE — 4040F PNEUMOC VAC/ADMIN/RCVD: CPT | Performed by: FAMILY MEDICINE

## 2022-02-21 PROCEDURE — 99213 OFFICE O/P EST LOW 20 MIN: CPT | Performed by: FAMILY MEDICINE

## 2022-02-21 PROCEDURE — 1036F TOBACCO NON-USER: CPT | Performed by: FAMILY MEDICINE

## 2022-02-21 PROCEDURE — 1090F PRES/ABSN URINE INCON ASSESS: CPT | Performed by: FAMILY MEDICINE

## 2022-02-21 PROCEDURE — G8427 DOCREV CUR MEDS BY ELIG CLIN: HCPCS | Performed by: FAMILY MEDICINE

## 2022-02-21 NOTE — PROGRESS NOTES
[address] [facility]  [phone]   Nacho Cho MD     Patient: Satnam Bradley  YOB: 1929  Visit Date: 2/21/22    Reema Cristina is a 80y.o. year old female here today for   Chief Complaint   Patient presents with    Fatigue    Generalized Body Aches       HPI  Patient is a 80year old female with, alzheimer's dementia ,anxiety and depression, Body aches and fatigue. Not much different than they have been . Memory is getting worse. remeron increased to 15mg yesterday. No chest pain or shortness of breath. Has oxygen machine that she uses at home. No recent heart palpitations or chest pain. No falls. Review of Systems   Constitutional: Negative for chills and fever. Respiratory: Negative for cough, shortness of breath and wheezing. Cardiovascular: Negative for chest pain, palpitations and leg swelling. Gastrointestinal: Negative for abdominal pain. Neurological: Negative for dizziness and light-headedness.        Current Outpatient Medications on File Prior to Visit   Medication Sig Dispense Refill    alendronate (FOSAMAX) 70 MG tablet Take 1 tablet by mouth every 7 days 12 tablet 3    memantine (NAMENDA) 5 MG tablet TAKE two TABLETs in the morning and 1 tablet in the evening 180 tablet 3    donepezil (ARICEPT) 5 MG tablet Take 1 tablet by mouth nightly 30 tablet 3    sertraline (ZOLOFT) 50 MG tablet Take 1 tablet by mouth daily 30 tablet 3    mirtazapine (REMERON) 15 MG tablet Take 1 tablet by mouth nightly 90 tablet 3    meloxicam (MOBIC) 7.5 MG tablet Take 1 tablet by mouth daily as needed for Pain 90 tablet 0    omeprazole (PRILOSEC) 20 MG delayed release capsule Take 1 capsule by mouth daily 30 capsule 3    SALINE NA 1 spray by Nasal route as needed      Misc Natural Products (LUTEIN 20 PO) Take 1 capsule by mouth daily Eye Vitamin      Biotin 5000 MCG TABS Take 1 tablet by mouth daily      Ibuprofen-Acetaminophen (ADVIL DUAL ACTION PO) Take 2 tablets by mouth 2 times daily      Umeclidinium Bromide (INCRUSE ELLIPTA) 62.5 MCG/INH AEPB Inhale 1 puff into the lungs daily 3 each 2    LORATADINE PO Take 5 mg by mouth daily       vitamin B-6 (PYRIDOXINE) 50 MG tablet Take 50 mg by mouth daily      Handicap Placard MISC by Does not apply route Unable to ambulate more than 40 feet without difficulty  Expires 10/14/2025 1 each 0    meclizine (ANTIVERT) 12.5 MG tablet Take 12.5 mg by mouth 3 times daily as needed      magnesium oxide (MAG-OX) 400 MG tablet Take 400 mg by mouth daily      Cyanocobalamin (VITAMIN B 12 PO) Take 1,000 mcg by mouth daily       vitamin D (CHOLECALCIFEROL) 1000 UNIT TABS tablet Take 1,000 Units by mouth 2 times daily       Biotin w/ Vitamins C & E (HAIR/SKIN/NAILS PO) Take 1 tablet by mouth daily       Multiple Vitamins-Minerals (MULTIVITAMIN ADULT PO) Take 1 tablet by mouth daily        No current facility-administered medications on file prior to visit. Allergies   Allergen Reactions    Demerol Hcl [Meperidine]      Other reaction(s): Intolerance  \"went into shok\"    Statins Other (See Comments)       Past medical, surgical, socialand/or family history reviewed, updated as needed, and are non-contributory (unless otherwise stated). Medications, allergies, and problem list also reviewed and updated as needed in patient's record. Wt Readings from Last 3 Encounters:   02/21/22 127 lb (57.6 kg)   02/08/22 125 lb 3.2 oz (56.8 kg)   12/01/21 119 lb 11.2 oz (54.3 kg)                   /72 (Site: Left Upper Arm, Position: Sitting)   Pulse 64   Resp 18   Ht 5' 6\" (1.676 m)   Wt 127 lb (57.6 kg)   SpO2 96%   BMI 20.50 kg/m²        Physical Exam  Vitals and nursing note reviewed. Constitutional:       Appearance: She is well-developed. Cardiovascular:      Rate and Rhythm: Normal rate and regular rhythm. Heart sounds: Normal heart sounds.  No murmur heard.  No friction rub. Pulmonary:      Effort: Pulmonary effort is normal. No respiratory distress. Breath sounds: Normal breath sounds. No stridor. No wheezing or rales. Abdominal:      General: Bowel sounds are normal. There is no distension. Palpations: Abdomen is soft. There is no mass. Tenderness: There is no abdominal tenderness. There is no guarding or rebound. Musculoskeletal:         General: Normal range of motion. Right lower leg: No edema. Left lower leg: No edema.        Results for orders placed or performed in visit on 11/29/21   TSH   Result Value Ref Range    TSH 1.540 0.270 - 4.200 uIU/mL   CBC Auto Differential   Result Value Ref Range    WBC 5.3 4.5 - 11.5 E9/L    RBC 4.52 3.50 - 5.50 E12/L    Hemoglobin 13.7 11.5 - 15.5 g/dL    Hematocrit 42.8 34.0 - 48.0 %    MCV 94.7 80.0 - 99.9 fL    MCH 30.3 26.0 - 35.0 pg    MCHC 32.0 32.0 - 34.5 %    RDW 13.2 11.5 - 15.0 fL    Platelets 548 979 - 402 E9/L    MPV 11.8 7.0 - 12.0 fL    Neutrophils % 63.8 43.0 - 80.0 %    Immature Granulocytes % 0.4 0.0 - 5.0 %    Lymphocytes % 25.4 20.0 - 42.0 %    Monocytes % 9.8 2.0 - 12.0 %    Eosinophils % 0.4 0.0 - 6.0 %    Basophils % 0.2 0.0 - 2.0 %    Neutrophils Absolute 3.40 1.80 - 7.30 E9/L    Immature Granulocytes # 0.02 E9/L    Lymphocytes Absolute 1.35 (L) 1.50 - 4.00 E9/L    Monocytes Absolute 0.52 0.10 - 0.95 E9/L    Eosinophils Absolute 0.02 (L) 0.05 - 0.50 E9/L    Basophils Absolute 0.01 0.00 - 0.20 C3/S   Basic Metabolic Panel   Result Value Ref Range    Sodium 138 132 - 146 mmol/L    Potassium 3.7 3.5 - 5.0 mmol/L    Chloride 99 98 - 107 mmol/L    CO2 28 22 - 29 mmol/L    Anion Gap 11 7 - 16 mmol/L    Glucose 94 74 - 99 mg/dL    BUN 13 6 - 23 mg/dL    CREATININE 0.7 0.5 - 1.0 mg/dL    GFR Non-African American >60 >=60 mL/min/1.73    GFR African American >60     Calcium 9.9 8.6 - 10.2 mg/dL       ASSESSMENT/PLAN  Emilee was seen today for fatigue and generalized body aches.    Diagnoses and all orders for this visit:    Late onset Alzheimer's dementia without behavioral disturbance ([facility] [address]   - On aricept and namenda    - Seeing Dr. Pineda Cole     Moderate single current episode [facility]   - remeron recently increased to 15mg    Chronic fatigue   - Discussed resting periodically during the day. Most likely age related     Body aches   - Prn meloxicam.          Phone/MyChart follow up if tests abnormal.    Return in about 3 months (around 5/21/2022). or sooner if necessary. I have reviewed myfindings and recommendations with Paolo Steiner.  Mirta Saleh MD, M.D

## 2022-02-24 ASSESSMENT — ENCOUNTER SYMPTOMS
SHORTNESS OF BREATH: 0
WHEEZING: 0
COUGH: 0
ABDOMINAL PAIN: 0

## 2022-03-08 ENCOUNTER — PATIENT MESSAGE (OUTPATIENT)
Dept: FAMILY MEDICINE CLINIC | Age: 87
End: 2022-03-08

## 2022-03-08 NOTE — TELEPHONE ENCOUNTER
From: Austin Smith  To: Dr. Nicolas Storey: 3/8/2022 3:07 PM EST  Subject: Discontinue oxygen machine    Discontinue oxygen machine  Dr Dread Beal after using the Oxygen machine at night for several months I have not noticed any difference in my sleep energy or how I am feeling  Can we please make a request to have the machine picked up and discontinued at this time Please see full consult for full plan of care  target glucose is 100-180  -at goal this am, fasting. Would c/w Lantus at currently ordered at 22 units  -now above goal with hyperglycemia at lunch, started premeal humalog at 2 units TID before meals. Decreased correctional scales from moderate to low.   -Would trend FS data    Pt would like to follow up to an endocrine provider closer to where he lives. He will see his PCP upon discharge and establish endocrine at that time. Any questions, please have patient call Dr Gutierrez at .

## 2022-04-01 RX ORDER — OMEPRAZOLE 20 MG/1
20 CAPSULE, DELAYED RELEASE ORAL DAILY
Qty: 30 CAPSULE | Refills: 3 | Status: SHIPPED
Start: 2022-04-01 | End: 2022-08-29

## 2022-04-05 RX ORDER — MELOXICAM 7.5 MG/1
7.5 TABLET ORAL DAILY PRN
Qty: 90 TABLET | Refills: 0 | Status: SHIPPED
Start: 2022-04-05 | End: 2022-06-29

## 2022-04-25 NOTE — TELEPHONE ENCOUNTER
Patient called stating she is wanting to have her oxygen machine picked up and needs order to discontinue. I asked patient where the machine is from and she stated she is unsure and looked for a name on the equipment.       Last seen 2/21/2022  Next appt

## 2022-05-09 RX ORDER — MIRTAZAPINE 7.5 MG/1
TABLET, FILM COATED ORAL
Qty: 30 TABLET | Refills: 5 | OUTPATIENT
Start: 2022-05-09

## 2022-06-02 DIAGNOSIS — F32.1 MODERATE SINGLE CURRENT EPISODE OF MAJOR DEPRESSIVE DISORDER (HCC): ICD-10-CM

## 2022-06-18 DIAGNOSIS — J41.0 SIMPLE CHRONIC BRONCHITIS (HCC): ICD-10-CM

## 2022-06-20 RX ORDER — UMECLIDINIUM 62.5 UG/1
1 AEROSOL, POWDER ORAL DAILY
Qty: 30 EACH | Refills: 2 | Status: SHIPPED
Start: 2022-06-20 | End: 2022-09-28 | Stop reason: SDUPTHER

## 2022-06-20 NOTE — TELEPHONE ENCOUNTER
Last Appointment:  2/21/2022  Future Appointments   Date Time Provider Mikaela Philippe   6/21/2022  2:15 PM [facility]

## 2022-06-21 ENCOUNTER — OFFICE VISIT (OUTPATIENT)
Dept: GERIATRIC MEDICINE | Age: 87
End: 2022-06-21
Payer: MEDICARE

## 2022-06-21 VITALS
TEMPERATURE: 97.5 F | HEIGHT: 66 IN | HEART RATE: 64 BPM | BODY MASS INDEX: 21.28 KG/M2 | DIASTOLIC BLOOD PRESSURE: 72 MMHG | RESPIRATION RATE: 22 BRPM | SYSTOLIC BLOOD PRESSURE: 154 MMHG | WEIGHT: 132.4 LBS

## 2022-06-21 DIAGNOSIS — F02.80 ALZHEIMER DISEASE (HCC): Primary | ICD-10-CM

## 2022-06-21 DIAGNOSIS — G30.9 ALZHEIMER DISEASE (HCC): Primary | ICD-10-CM

## 2022-06-21 PROCEDURE — G8427 DOCREV CUR MEDS BY ELIG CLIN: HCPCS | Performed by: INTERNAL MEDICINE

## 2022-06-21 PROCEDURE — 1036F TOBACCO NON-USER: CPT | Performed by: INTERNAL MEDICINE

## 2022-06-21 PROCEDURE — 99212 OFFICE O/P EST SF 10 MIN: CPT | Performed by: INTERNAL MEDICINE

## 2022-06-21 PROCEDURE — 99213 OFFICE O/P EST LOW 20 MIN: CPT | Performed by: INTERNAL MEDICINE

## 2022-06-21 PROCEDURE — G8420 CALC BMI NORM PARAMETERS: HCPCS | Performed by: INTERNAL MEDICINE

## 2022-06-21 PROCEDURE — 1123F ACP DISCUSS/DSCN MKR DOCD: CPT | Performed by: INTERNAL MEDICINE

## 2022-06-21 PROCEDURE — 1090F PRES/ABSN URINE INCON ASSESS: CPT | Performed by: INTERNAL MEDICINE

## 2022-06-21 RX ORDER — MEMANTINE HYDROCHLORIDE 10 MG/1
10 TABLET ORAL 2 TIMES DAILY
COMMUNITY
End: 2022-06-21 | Stop reason: SDUPTHER

## 2022-06-21 ASSESSMENT — PATIENT HEALTH QUESTIONNAIRE - PHQ9
SUM OF ALL RESPONSES TO PHQ QUESTIONS 1-9: 0
1. LITTLE INTEREST OR PLEASURE IN DOING THINGS: 0
SUM OF ALL RESPONSES TO PHQ QUESTIONS 1-9: 0
2. FEELING DOWN, DEPRESSED OR HOPELESS: 0
SUM OF ALL RESPONSES TO PHQ QUESTIONS 1-9: 0
SUM OF ALL RESPONSES TO PHQ QUESTIONS 1-9: 0
SUM OF ALL RESPONSES TO PHQ9 QUESTIONS 1 & 2: 0

## 2022-06-21 NOTE — PROGRESS NOTES
Chief Complaint   Patient presents with    Follow-up     February follow up. Here with son, Maurilio Overton. Pt states her \"memory is bad\". Son states memory is a little worse than at last visit.  Difficulty Walking     Per son, pt's balance is getting worse & she is using her walker more in the house. No walker here now -- son assists.  Joint Swelling     Some swelling in both ankles -- worse on the left. Per pt, no pain in the feet, denies any injury.  Blood Pressure Check     146/78. Repeat 154/72.  Other     Has a \"tickle\" in her throat for a few days. Throat feels dry. No coughing yesterday or today, non-productive before that. No soreness in throat.  Fatigue     \"I get tired awful easy\". Above issues discussed with Dr Ash Nolasco prior to his seeing the pt.

## 2022-06-21 NOTE — PROGRESS NOTES
CC Recheck Dementia    ere with Syed from [facility] and here for 2 weeks   Lives by [address] there since 2005  He worked for [employer] but not balanced meals  Sleeps well well  No sundowning and checks doors   Weight stable and up  Doing her IADLS except daughter does pills  No DRIVING  Cooking  Simple , uses microwave   ADL's Daughter in [place] helps bathing  On Aricept 10 mg a day and NAmenda 20 mg a day  Mirtazepine 15 mg and Sertraline 50 mg a day  Large nails  Feet and toenails x 10 trimmed  Right ear wax , left clear  Impression: SDAT stable                      Onychomycosis  Plan:  Continue same Aricept 5 mg a day             Namenda 5 mg a day             Zoloft 50 mg a day and Mirtazepine 15 mghas

## 2022-06-29 RX ORDER — MELOXICAM 7.5 MG/1
7.5 TABLET ORAL DAILY PRN
Qty: 90 TABLET | Refills: 0 | Status: SHIPPED
Start: 2022-06-29 | End: 2022-09-28 | Stop reason: SDUPTHER

## 2022-08-09 ENCOUNTER — TELEPHONE (OUTPATIENT)
Dept: FAMILY MEDICINE CLINIC | Age: 87
End: 2022-08-09

## 2022-08-09 DIAGNOSIS — J32.9 CHRONIC SINUSITIS, UNSPECIFIED LOCATION: Primary | ICD-10-CM

## 2022-08-09 NOTE — TELEPHONE ENCOUNTER
Patient daughter called to request a new referral to Dr Severo Kilts  She has not been there in a few years so they need a new referral please

## 2022-08-29 RX ORDER — OMEPRAZOLE 20 MG/1
20 CAPSULE, DELAYED RELEASE ORAL DAILY
Qty: 30 CAPSULE | Refills: 3 | Status: SHIPPED
Start: 2022-08-29 | End: 2022-09-28 | Stop reason: SDUPTHER

## 2022-08-29 RX ORDER — DONEPEZIL HYDROCHLORIDE 5 MG/1
5 TABLET, FILM COATED ORAL NIGHTLY
Qty: 30 TABLET | Refills: 3 | Status: SHIPPED
Start: 2022-08-29 | End: 2022-09-28 | Stop reason: SDUPTHER

## 2022-09-15 ENCOUNTER — OFFICE VISIT (OUTPATIENT)
Dept: ENT CLINIC | Age: 87
End: 2022-09-15
Payer: MEDICARE

## 2022-09-15 VITALS
HEART RATE: 55 BPM | BODY MASS INDEX: 21.83 KG/M2 | SYSTOLIC BLOOD PRESSURE: 150 MMHG | HEIGHT: 65 IN | WEIGHT: 131 LBS | DIASTOLIC BLOOD PRESSURE: 73 MMHG

## 2022-09-15 DIAGNOSIS — R09.82 POST-NASAL DRAINAGE: ICD-10-CM

## 2022-09-15 DIAGNOSIS — R42 DISEQUILIBRIUM: ICD-10-CM

## 2022-09-15 DIAGNOSIS — J34.89 NASAL DRYNESS: ICD-10-CM

## 2022-09-15 DIAGNOSIS — H61.23 BILATERAL IMPACTED CERUMEN: Primary | ICD-10-CM

## 2022-09-15 PROCEDURE — 1036F TOBACCO NON-USER: CPT | Performed by: NURSE PRACTITIONER

## 2022-09-15 PROCEDURE — G8420 CALC BMI NORM PARAMETERS: HCPCS | Performed by: NURSE PRACTITIONER

## 2022-09-15 PROCEDURE — 1090F PRES/ABSN URINE INCON ASSESS: CPT | Performed by: NURSE PRACTITIONER

## 2022-09-15 PROCEDURE — 1123F ACP DISCUSS/DSCN MKR DOCD: CPT | Performed by: NURSE PRACTITIONER

## 2022-09-15 PROCEDURE — G8427 DOCREV CUR MEDS BY ELIG CLIN: HCPCS | Performed by: NURSE PRACTITIONER

## 2022-09-15 PROCEDURE — 69210 REMOVE IMPACTED EAR WAX UNI: CPT | Performed by: NURSE PRACTITIONER

## 2022-09-15 PROCEDURE — 99213 OFFICE O/P EST LOW 20 MIN: CPT | Performed by: NURSE PRACTITIONER

## 2022-09-15 ASSESSMENT — ENCOUNTER SYMPTOMS
SINUS PAIN: 0
RESPIRATORY NEGATIVE: 1
SINUS PRESSURE: 0
SHORTNESS OF BREATH: 0
STRIDOR: 0
EYES NEGATIVE: 1
RHINORRHEA: 0

## 2022-09-15 NOTE — PROGRESS NOTES
[facility] Otolaryngology  Dr. Concetta Jeans. Wilmar Davey Ms.Ed        Patient Name:  Ella Summers  :  1929     CHIEF C/O:    Chief Complaint   Patient presents with    New Patient     Chronic sinusitis, decreased hearing/vertigo/cerumen, feeling of something stuck in throat,        HISTORY OBTAINED FROM:  patient, family member - daughter. HISTORY OF PRESENT ILLNESS:       Matilda Sun is a 80y.o. year old female, here today for follow up of sinus problems, decreased hearing and cerumen impactions. Symptoms for several months  Intermittent congestion with persistent watering of the left eye  Feels like something is in throat, some PND  Denies persistent rhinorrhea  Does complain of fullness in both ears, denies recurrent hx of cerumen impactions. Patient does have hearing loss with hearing aids  Continues to have random episodes of dizziness, short periods, passes in a few seconds, usually after standing from a seated position. Feels off balance at times, finds something to hold on to until symptoms pass if standing  Denies falls from her dizziness.         Past Medical History:   Diagnosis Date    Asthma     COPD (chronic obstructive pulmonary disease) ([facility] [address]     Depression     Hyperlipidemia     Hypertension      Past Surgical History:   Procedure Laterality Date    CATARACT REMOVAL WITH IMPLANT      right eye    HYSTERECTOMY (CERVIX STATUS UNKNOWN)         Current Outpatient Medications:     donepezil (ARICEPT) 5 MG tablet, Take 1 tablet by mouth nightly, Disp: 30 tablet, Rfl: 3    omeprazole (PRILOSEC) 20 MG delayed release capsule, Take 1 capsule by mouth daily, Disp: 30 capsule, Rfl: 3    meloxicam (MOBIC) 7.5 MG tablet, TAKE 1 TABLET BY MOUTH DAILY AS NEEDED FOR PAIN, Disp: 90 tablet, Rfl: 0    Memantine HCl (NAMENDA PO), Take 10 mg by mouth 2 times daily, Disp: , Rfl:     oxyCODONE-Acetaminophen (PERCOCET PO), Take 1 tablet by mouth every 12 hours as needed, Disp: , Rfl:     Umeclidinium Bromide (INCRUSE ELLIPTA) 62.5 MCG/INH AEPB, Inhale 1 puff into the lungs daily, Disp: 30 each, Rfl: 2    sertraline (ZOLOFT) 50 MG tablet, TAKE 1 TABLET BY MOUTH DAILY, Disp: 30 tablet, Rfl: 3    alendronate (FOSAMAX) 70 MG tablet, Take 1 tablet by mouth every 7 days, Disp: 12 tablet, Rfl: 3    mirtazapine (REMERON) 15 MG tablet, Take 1 tablet by mouth nightly, Disp: 90 tablet, Rfl: 3    SALINE NA, 1 spray by Nasal route as needed, Disp: , Rfl:     Misc Natural Products (LUTEIN 20 PO), Take 1 capsule by mouth daily Eye Vitamin, Disp: , Rfl:     Biotin 5000 MCG TABS, Take 1 tablet by mouth daily, Disp: , Rfl:     Ibuprofen-Acetaminophen (ADVIL DUAL ACTION PO), Take 2 tablets by mouth 2 times daily, Disp: , Rfl:     LORATADINE PO, Take 5 mg by mouth daily , Disp: , Rfl:     vitamin B-6 (PYRIDOXINE) 50 MG tablet, Take 50 mg by mouth daily, Disp: , Rfl:     Handicap Placard [facility], by Does not apply route Unable to ambulate more than 40 feet without difficulty Expires 10/14/2025, Disp: 1 each, Rfl: 0    meclizine (ANTIVERT) 12.5 MG tablet, Take 12.5 mg by mouth 3 times daily as needed, Disp: , Rfl:     magnesium oxide (MAG-OX) 400 MG tablet, Take 400 mg by mouth daily, Disp: , Rfl:     Cyanocobalamin (VITAMIN B 12 PO), Take 1,000 mcg by mouth daily , Disp: , Rfl:     vitamin D (CHOLECALCIFEROL) 1000 UNIT TABS tablet, Take 2,000 Units by mouth daily , Disp: , Rfl:     Biotin w/ Vitamins C & E (HAIR/SKIN/NAILS PO), Take 1 tablet by mouth daily , Disp: , Rfl:     Multiple Vitamins-Minerals (MULTIVITAMIN ADULT PO), Take 1 tablet by mouth daily , Disp: , Rfl:   Demerol hcl [meperidine] and Statins  Social History     Tobacco Use    Smoking status: Never    Smokeless tobacco: Never   Vaping Use    Vaping Use: Never used   Substance Use Topics    Alcohol use: No     Comment: 1 cup of coffee a day     Drug use: No     History reviewed. No pertinent family history. Review of Systems   Constitutional: Negative.   Negative for activity change and appetite change. HENT:  Positive for congestion, hearing loss and postnasal drip. Negative for ear discharge, ear pain, rhinorrhea, sinus pressure and sinus pain. Eyes: Negative. Respiratory: Negative. Negative for shortness of breath and stridor. Cardiovascular: Negative. Negative for chest pain and palpitations. Endocrine: Negative. Musculoskeletal: Negative. Skin: Negative. Neurological:  Positive for dizziness (Intermittent disequilibrium). Hematological: Negative. Psychiatric/Behavioral: Negative. BP (!) 150/73   Pulse 55   Ht 5' 5\" (1.651 m)   Wt 131 lb (59.4 kg)   BMI 21.80 kg/m²   Physical Exam  Constitutional:       Appearance: Normal appearance. HENT:      Head: Normocephalic. Right Ear: Tympanic membrane, ear canal and external ear normal. Decreased hearing noted. There is impacted cerumen. Left Ear: Tympanic membrane, ear canal and external ear normal. Decreased hearing noted. There is impacted cerumen. Nose:      Right Turbinates: Pale. Left Turbinates: Pale. Mouth/Throat:      Mouth: Mucous membranes are moist.     Eyes:      Conjunctiva/sclera: Conjunctivae normal.      Pupils: Pupils are equal, round, and reactive to light. Cardiovascular:      Rate and Rhythm: Normal rate and regular rhythm. Pulses: Normal pulses. Pulmonary:      Effort: Pulmonary effort is normal. No respiratory distress. Breath sounds: No stridor. Musculoskeletal:         General: Normal range of motion. Cervical back: Normal range of motion. No rigidity. No muscular tenderness. Skin:     General: Skin is warm and dry. Neurological:      General: No focal deficit present. Mental Status: She is alert and oriented to person, place, and time. Psychiatric:         Mood and Affect: Mood normal.         Behavior: Behavior normal.         Thought Content:  Thought content normal.         Judgment: Judgment normal. IMPRESSION/PLAN:  Cerumen removal     Auditory canal(s) both ears partially obstructed with cerumen. Cerumen was gently removed using soft plastic curette, gentle suction. Tympanic membranes are intact following the procedure. Auditory canals appear normal.        Emilee was seen today for new patient. Diagnoses and all orders for this visit:    Bilateral impacted cerumen    Nasal dryness    Post-nasal drainage    Disequilibrium    Bilateral cerumen impactions removed without difficulty. Patient tolerated well. At this time no new medications will be started and she will continue with her over-the-counter Claritin for her allergy symptoms. It is recommended that she begin using nasal saline spray, 2 sprays each nostril 3-4 times daily. In regards to her disequilibrium she is encouraged again to stand and wait prior to ambulating after sitting especially for long periods of time. She will follow-up in 3 months for reevaluation. She is instructed to call with any new or worsening of symptoms prior to her next appointment.       Terrell Ruelas MSN, FNP-C  [facility], Nose and Throat    The information contained in this note has been dictated using drug and medical speech recognition software and may contain errors

## 2022-09-28 ENCOUNTER — OFFICE VISIT (OUTPATIENT)
Dept: FAMILY MEDICINE CLINIC | Age: 87
End: 2022-09-28
Payer: MEDICARE

## 2022-09-28 VITALS
DIASTOLIC BLOOD PRESSURE: 69 MMHG | TEMPERATURE: 97.7 F | BODY MASS INDEX: 22.16 KG/M2 | HEIGHT: 65 IN | WEIGHT: 133 LBS | HEART RATE: 54 BPM | RESPIRATION RATE: 18 BRPM | SYSTOLIC BLOOD PRESSURE: 137 MMHG | OXYGEN SATURATION: 96 %

## 2022-09-28 DIAGNOSIS — F02.80 LATE ONSET ALZHEIMER'S DEMENTIA WITHOUT BEHAVIORAL DISTURBANCE (HCC): ICD-10-CM

## 2022-09-28 DIAGNOSIS — M81.0 AGE-RELATED OSTEOPOROSIS WITHOUT CURRENT PATHOLOGICAL FRACTURE: ICD-10-CM

## 2022-09-28 DIAGNOSIS — G30.1 LATE ONSET ALZHEIMER'S DEMENTIA WITHOUT BEHAVIORAL DISTURBANCE (HCC): ICD-10-CM

## 2022-09-28 DIAGNOSIS — F32.1 MODERATE SINGLE CURRENT EPISODE OF MAJOR DEPRESSIVE DISORDER (HCC): ICD-10-CM

## 2022-09-28 DIAGNOSIS — J41.0 SIMPLE CHRONIC BRONCHITIS (HCC): ICD-10-CM

## 2022-09-28 DIAGNOSIS — G30.9 ALZHEIMER'S DISEASE, UNSPECIFIED (CODE) (HCC): ICD-10-CM

## 2022-09-28 DIAGNOSIS — Z00.00 MEDICARE ANNUAL WELLNESS VISIT, SUBSEQUENT: Primary | ICD-10-CM

## 2022-09-28 PROCEDURE — 1123F ACP DISCUSS/DSCN MKR DOCD: CPT | Performed by: FAMILY MEDICINE

## 2022-09-28 PROCEDURE — G0439 PPPS, SUBSEQ VISIT: HCPCS | Performed by: FAMILY MEDICINE

## 2022-09-28 RX ORDER — DONEPEZIL HYDROCHLORIDE 5 MG/1
5 TABLET, FILM COATED ORAL NIGHTLY
Qty: 90 TABLET | Refills: 3 | Status: SHIPPED | OUTPATIENT
Start: 2022-09-28

## 2022-09-28 RX ORDER — MEMANTINE HYDROCHLORIDE 10 MG/1
10 TABLET ORAL 2 TIMES DAILY
Qty: 180 TABLET | Refills: 1 | Status: SHIPPED | OUTPATIENT
Start: 2022-09-28

## 2022-09-28 RX ORDER — ALENDRONATE SODIUM 70 MG/1
70 TABLET ORAL
Qty: 12 TABLET | Refills: 3 | Status: SHIPPED | OUTPATIENT
Start: 2022-09-28

## 2022-09-28 RX ORDER — MIRTAZAPINE 15 MG/1
15 TABLET, FILM COATED ORAL NIGHTLY
Qty: 90 TABLET | Refills: 3 | Status: SHIPPED | OUTPATIENT
Start: 2022-09-28

## 2022-09-28 RX ORDER — MELOXICAM 7.5 MG/1
7.5 TABLET ORAL DAILY PRN
Qty: 90 TABLET | Refills: 1 | Status: SHIPPED
Start: 2022-09-28 | End: 2022-09-29 | Stop reason: SDUPTHER

## 2022-09-28 RX ORDER — UMECLIDINIUM 62.5 UG/1
1 AEROSOL, POWDER ORAL DAILY
Qty: 30 EACH | Refills: 2 | Status: SHIPPED
Start: 2022-09-28 | End: 2022-09-29

## 2022-09-28 RX ORDER — OMEPRAZOLE 20 MG/1
20 CAPSULE, DELAYED RELEASE ORAL DAILY
Qty: 90 CAPSULE | Refills: 3 | Status: SHIPPED | OUTPATIENT
Start: 2022-09-28

## 2022-09-28 SDOH — ECONOMIC STABILITY: FOOD INSECURITY: WITHIN THE PAST 12 MONTHS, THE FOOD YOU BOUGHT JUST DIDN'T LAST AND YOU DIDN'T HAVE MONEY TO GET MORE.: NEVER TRUE

## 2022-09-28 SDOH — ECONOMIC STABILITY: FOOD INSECURITY: WITHIN THE PAST 12 MONTHS, YOU WORRIED THAT YOUR FOOD WOULD RUN OUT BEFORE YOU GOT MONEY TO BUY MORE.: NEVER TRUE

## 2022-09-28 ASSESSMENT — PATIENT HEALTH QUESTIONNAIRE - PHQ9
3. TROUBLE FALLING OR STAYING ASLEEP: 0
SUM OF ALL RESPONSES TO PHQ QUESTIONS 1-9: 5
10. IF YOU CHECKED OFF ANY PROBLEMS, HOW DIFFICULT HAVE THESE PROBLEMS MADE IT FOR YOU TO DO YOUR WORK, TAKE CARE OF THINGS AT HOME, OR GET ALONG WITH OTHER PEOPLE: 0
6. FEELING BAD ABOUT YOURSELF - OR THAT YOU ARE A FAILURE OR HAVE LET YOURSELF OR YOUR FAMILY DOWN: 0
SUM OF ALL RESPONSES TO PHQ QUESTIONS 1-9: 5
SUM OF ALL RESPONSES TO PHQ QUESTIONS 1-9: 5
SUM OF ALL RESPONSES TO PHQ9 QUESTIONS 1 & 2: 4
4. FEELING TIRED OR HAVING LITTLE ENERGY: 0
SUM OF ALL RESPONSES TO PHQ QUESTIONS 1-9: 5
8. MOVING OR SPEAKING SO SLOWLY THAT OTHER PEOPLE COULD HAVE NOTICED. OR THE OPPOSITE, BEING SO FIGETY OR RESTLESS THAT YOU HAVE BEEN MOVING AROUND A LOT MORE THAN USUAL: 0
5. POOR APPETITE OR OVEREATING: 1
1. LITTLE INTEREST OR PLEASURE IN DOING THINGS: 2
2. FEELING DOWN, DEPRESSED OR HOPELESS: 2
9. THOUGHTS THAT YOU WOULD BE BETTER OFF DEAD, OR OF HURTING YOURSELF: 0
7. TROUBLE CONCENTRATING ON THINGS, SUCH AS READING THE NEWSPAPER OR WATCHING TELEVISION: 0

## 2022-09-28 ASSESSMENT — LIFESTYLE VARIABLES
HOW OFTEN DO YOU HAVE A DRINK CONTAINING ALCOHOL: NEVER
HOW MANY STANDARD DRINKS CONTAINING ALCOHOL DO YOU HAVE ON A TYPICAL DAY: PATIENT DOES NOT DRINK

## 2022-09-28 ASSESSMENT — SOCIAL DETERMINANTS OF HEALTH (SDOH): HOW HARD IS IT FOR YOU TO PAY FOR THE VERY BASICS LIKE FOOD, HOUSING, MEDICAL CARE, AND HEATING?: NOT HARD AT ALL

## 2022-09-28 NOTE — PATIENT INSTRUCTIONS
You look good ! You can take 1000mg of tylenol at a time. Let me know if that helps       Personalized Preventive Plan for Keegan Marino - 9/28/2022  Medicare offers a range of preventive health benefits. Some of the tests and screenings are paid in full while other may be subject to a deductible, co-insurance, and/or copay. Some of these benefits include a comprehensive review of your medical history including lifestyle, illnesses that may run in your family, and various assessments and screenings as appropriate. After reviewing your medical record and screening and assessments performed today your provider may have ordered immunizations, labs, imaging, and/or referrals for you. A list of these orders (if applicable) as well as your Preventive Care list are included within your After Visit Summary for your review. Other Preventive Recommendations:    A preventive eye exam performed by an eye specialist is recommended every 1-2 years to screen for glaucoma; cataracts, macular degeneration, and other eye disorders. A preventive dental visit is recommended every 6 months. Try to get at least 150 minutes of exercise per week or 10,000 steps per day on a pedometer . Order or download the FREE \"Exercise & Physical Activity: Your Everyday Guide\" from [employer]. Call 2-933.470.9791 or search [employer] online. You need 5381-6152 mg of calcium and 3325-8847 IU of vitamin D per day. It is possible to meet your calcium requirement with diet alone, but a vitamin D supplement is usually necessary to meet this goal.  When exposed to the sun, use a sunscreen that protects against both UVA and UVB radiation with an SPF of 30 or greater. Reapply every 2 to 3 hours or after sweating, drying off with a towel, or swimming. Always wear a seat belt when traveling in a car. Always wear a helmet when riding a bicycle or motorcycle.

## 2022-09-28 NOTE — PROGRESS NOTES
Medicare Annual Wellness Visit    Ella Summers is here for Medicare AWV    Assessment & Plan   Medicare annual wellness visit, subsequent  Simple chronic bronchitis (HCC)  Moderate single current episode of major depressive disorder ([address]  Age-related osteoporosis without current pathological fracture  -     alendronate (FOSAMAX) 70 MG tablet; Take 1 tablet by mouth every 7 days, Disp-12 tablet, R-3Normal  Alzheimer's disease, unspecified  Late onset Alzheimer's dementia without behavioral disturbance ([address]        Had long discussion regarding Alzheimers and disease progression. Discussed safety considerations including home aid , life alert button. Daughter checks on mother daily and has camera in home. Denies any wandering. Will increase tylenol for increased pain and consider increase in mobic as needed. Recommendations for Preventive Services Due: see orders and patient instructions/AVS.  Recommended screening schedule for the next 5-10 years is provided to the patient in written form: see Patient Instructions/AVS.     Return for Medicare Annual Wellness Visit in 1 year. Subjective   The following acute and/or chronic problems were also addressed today:  Memory has been ok. Has been     Very fatigued and complains of pain daily. Hurts all over from head to her toes. More confused . Has an [employer] - one day a week. Brother comes one week out of every month. Grandson lives in her basement. No falls. Is a wall walker. Has a walker and uses it when she remembers to use it. Daughter Mickeal Speaks is in and out. Encourages her to have aide come in more often. Patient's complete Health Risk Assessment and screening values have been reviewed and are found in Flowsheets. The following problems were reviewed today and where indicated follow up appointments were made and/or referrals ordered.     Positive Risk Factor Screenings with Interventions:    Fall Risk:  Do you feel unsteady or are you worried about falling? : (!) yes  2 or more falls in past year?: no  Fall with injury in past year?: (!) yes   Fall Risk Interventions:    Home safety tips provided     Depression:  PHQ-2 Score: 4  PHQ-9 Total Score: 5    Severity:1-4 = minimal depression, 5-9 = mild depression, 10-14 = moderate depression, 15-19 = moderately severe depression, 20-27 = severe depression  Depression Interventions:  Patient is taking zoloft and remeron for depression          General Health and ACP:  General  In general, how would you say your health is?: Good  In the past 7 days, have you experienced any of the following: New or Increased Pain, New or Increased Fatigue, Loneliness, Social Isolation, Stress or Anger?: (!) Yes  Select all that apply: (!) Loneliness  Do you get the social and emotional support that you need?: Yes  Do you have a Living Will?: Yes    Advance Directives       Power of  Living Will ACP-Advance Directive ACP-Power of     Not on File Not on File Not on File Not on File          General Health Risk Interventions:  Pain issues: medication adjusted- increased tylenol. Will increase mobic if needed.    Loneliness: has social support from family and they will be considering having aid come into home more often    Health Habits/Nutrition:  Physical Activity: Sufficiently Active    Days of Exercise per Week: 5 days    Minutes of Exercise per Session: 50 min     Have you lost any weight without trying in the past 3 months?: No  Body mass index: 22.13  Have you seen the dentist within the past year?: (!) No  Health Habits/Nutrition Interventions:  Dental exam overdue:  patient encouraged to make appointment with his/her dentist    Hearing/Vision:  Do you or your family notice any trouble with your hearing that hasn't been managed with hearing aids?: No  Do you have difficulty driving, watching TV, or doing any of your daily activities because of your eyesight?: (!) Yes  Have you had an eye exam within the past year?: Yes  No results found. Hearing/Vision Interventions:  Vision concerns:  patient encouraged to make appointment with his/her eye specialist            Objective   Vitals:    09/28/22 1347   BP: 137/69   Site: Right Upper Arm   Position: Sitting   Pulse: 54   Resp: 18   Temp: 97.7 °F (36.5 °C)   TempSrc: Temporal   SpO2: 96%   Weight: 133 lb (60.3 kg)   Height: 5' 5\" (1.651 m)      Body mass index is 22.13 kg/m². Physical Exam  Vitals and nursing note reviewed. Constitutional:       Appearance: She is well-developed. Cardiovascular:      Rate and Rhythm: Normal rate and regular rhythm. Heart sounds: Normal heart sounds. No murmur heard. No friction rub. Pulmonary:      Effort: Pulmonary effort is normal. No respiratory distress. Breath sounds: Normal breath sounds. No stridor. No wheezing or rales. Abdominal:      General: Bowel sounds are normal. There is no distension. Palpations: Abdomen is soft. There is no mass. Tenderness: There is no abdominal tenderness. There is no guarding or rebound. Musculoskeletal:         General: Normal range of motion. Right lower leg: No edema. Left lower leg: No edema. Allergies   Allergen Reactions    Demerol Hcl [Meperidine]      Other reaction(s): Intolerance  \"went into shok\"    Statins Other (See Comments)     Prior to Visit Medications    Medication Sig Taking?  Authorizing Provider   donepezil (ARICEPT) 5 MG tablet Take 1 tablet by mouth nightly Yes Jessica Allen MD   omeprazole (PRILOSEC) 20 MG delayed release capsule Take 1 capsule by mouth daily Yes Jessica Allen MD   memantine (NAMENDA) 10 MG tablet Take 1 tablet by mouth 2 times daily Yes Jessica Allen MD   alendronate (FOSAMAX) 70 MG tablet Take 1 tablet by mouth every 7 days Yes Jessica Allen MD   mirtazapine (REMERON) 15 MG tablet Take 1 tablet by mouth nightly Yes Jessica Allen MD   oxyCODONE-Acetaminophen (PERCOCET PO) Take 1 tablet by mouth every 12 hours as needed Yes Historical Provider, MD   SALINE NA 1 spray by Nasal route as needed Yes Historical Provider, MD   Misc Natural Products (LUTEIN 20 PO) Take 1 capsule by mouth daily Eye Vitamin Yes Historical Provider, MD   Biotin 5000 MCG TABS Take 1 tablet by mouth daily Yes Historical Provider, MD   Ibuprofen-Acetaminophen (ADVIL DUAL ACTION PO) Take 2 tablets by mouth 2 times daily Yes Historical Provider, MD   LORATADINE PO Take 5 mg by mouth daily  Yes Historical Provider, MD   vitamin B-6 (PYRIDOXINE) 50 MG tablet Take 50 mg by mouth daily Yes Historical Provider, MD   meclizine (ANTIVERT) 12.5 MG tablet Take 12.5 mg by mouth 3 times daily as needed Yes Historical Provider, MD   magnesium oxide (MAG-OX) 400 MG tablet Take 400 mg by mouth daily Yes Historical Provider, MD   Cyanocobalamin (VITAMIN B 12 PO) Take 1,000 mcg by mouth daily  Yes Historical Provider, MD   vitamin D (CHOLECALCIFEROL) 1000 UNIT TABS tablet Take 2,000 Units by mouth daily  Yes Historical Provider, MD   Biotin w/ Vitamins C & E (HAIR/SKIN/NAILS PO) Take 1 tablet by mouth daily  Yes Historical Provider, MD   Multiple Vitamins-Minerals (MULTIVITAMIN ADULT PO) Take 1 tablet by mouth daily  Yes Historical Provider, MD   INCRUSE ELLIPTA 62.5 MCG/INH AEPB INHALE 1 PUFF BY MOUTH INTO THE LUNGS DAILY  Alexandra Pierre MD   meloxicam (MOBIC) 7.5 MG tablet Take 1 tablet by mouth daily as needed for Pain  Alexandra Pierre MD   sertraline (ZOLOFT) 50 MG tablet Take 1 tablet by mouth daily  Alexandra Pierre MD   Handicap Placard MISC by Does not apply route Unable to ambulate more than 40 feet without difficulty  Expires 10/14/2025  Alexandra Pierre MD       CareTeam (Including outside providers/suppliers regularly involved in providing care):   Patient Care Team:  Alexandra Pierre MD as PCP - General (Family Medicine)  Alexandra Pierre MD as PCP - [facility] Empaneled Provider  Tequila Figueroa MD as Consulting Physician (Cardiology)     Reviewed and updated this visit:  Tobacco  Allergies  Meds  Med Hx  Surg Hx  Soc Hx  Fam Hx

## 2022-09-29 DIAGNOSIS — F32.1 MODERATE SINGLE CURRENT EPISODE OF MAJOR DEPRESSIVE DISORDER (HCC): ICD-10-CM

## 2022-09-29 DIAGNOSIS — J41.0 SIMPLE CHRONIC BRONCHITIS (HCC): ICD-10-CM

## 2022-09-29 RX ORDER — MELOXICAM 7.5 MG/1
7.5 TABLET ORAL DAILY PRN
Qty: 90 TABLET | Refills: 1 | Status: SHIPPED | OUTPATIENT
Start: 2022-09-29

## 2022-09-29 RX ORDER — UMECLIDINIUM 62.5 UG/1
AEROSOL, POWDER ORAL
Qty: 30 EACH | Refills: 2 | Status: SHIPPED | OUTPATIENT
Start: 2022-09-29

## 2022-09-29 RX ORDER — MELOXICAM 7.5 MG/1
7.5 TABLET ORAL DAILY PRN
Qty: 90 TABLET | Refills: 1 | OUTPATIENT
Start: 2022-09-29

## 2022-10-03 ENCOUNTER — OFFICE VISIT (OUTPATIENT)
Dept: FAMILY MEDICINE CLINIC | Age: 87
End: 2022-10-03
Payer: MEDICARE

## 2022-10-03 VITALS
DIASTOLIC BLOOD PRESSURE: 72 MMHG | OXYGEN SATURATION: 97 % | SYSTOLIC BLOOD PRESSURE: 152 MMHG | HEART RATE: 61 BPM | TEMPERATURE: 97.3 F | RESPIRATION RATE: 18 BRPM | BODY MASS INDEX: 21.38 KG/M2 | HEIGHT: 66 IN | WEIGHT: 133 LBS

## 2022-10-03 DIAGNOSIS — J40 SINOBRONCHITIS: Primary | ICD-10-CM

## 2022-10-03 DIAGNOSIS — J32.9 SINOBRONCHITIS: Primary | ICD-10-CM

## 2022-10-03 LAB
Lab: NORMAL
QC PASS/FAIL: NORMAL
SARS-COV-2 RDRP RESP QL NAA+PROBE: NEGATIVE

## 2022-10-03 PROCEDURE — G8484 FLU IMMUNIZE NO ADMIN: HCPCS | Performed by: NURSE PRACTITIONER

## 2022-10-03 PROCEDURE — G8420 CALC BMI NORM PARAMETERS: HCPCS | Performed by: NURSE PRACTITIONER

## 2022-10-03 PROCEDURE — 99213 OFFICE O/P EST LOW 20 MIN: CPT | Performed by: NURSE PRACTITIONER

## 2022-10-03 PROCEDURE — 1123F ACP DISCUSS/DSCN MKR DOCD: CPT | Performed by: NURSE PRACTITIONER

## 2022-10-03 PROCEDURE — G8427 DOCREV CUR MEDS BY ELIG CLIN: HCPCS | Performed by: NURSE PRACTITIONER

## 2022-10-03 PROCEDURE — 87635 SARS-COV-2 COVID-19 AMP PRB: CPT | Performed by: NURSE PRACTITIONER

## 2022-10-03 PROCEDURE — 1090F PRES/ABSN URINE INCON ASSESS: CPT | Performed by: NURSE PRACTITIONER

## 2022-10-03 PROCEDURE — 1036F TOBACCO NON-USER: CPT | Performed by: NURSE PRACTITIONER

## 2022-10-03 RX ORDER — BENZONATATE 100 MG/1
100 CAPSULE ORAL 3 TIMES DAILY PRN
Qty: 21 CAPSULE | Refills: 0 | Status: SHIPPED | OUTPATIENT
Start: 2022-10-03 | End: 2022-10-10

## 2022-10-03 RX ORDER — PREDNISONE 10 MG/1
10 TABLET ORAL 2 TIMES DAILY
Qty: 10 TABLET | Refills: 0 | Status: SHIPPED | OUTPATIENT
Start: 2022-10-03 | End: 2022-10-08

## 2022-10-03 RX ORDER — AZITHROMYCIN 250 MG/1
250 TABLET, FILM COATED ORAL SEE ADMIN INSTRUCTIONS
Qty: 6 TABLET | Refills: 0 | Status: SHIPPED | OUTPATIENT
Start: 2022-10-03 | End: 2022-10-08

## 2022-10-03 NOTE — PROGRESS NOTES
Chief Complaint       Cough (Chest congestion and sinus congestion and drainage started Friday )    History of Present Illness   Source of history provided by:  patient. Reynold Bertrand is a 80 y.o. old female presenting to the walk in clinic for evaluation of above symptoms, for x 3 days. Denies any diarrhea, nausea CP, dyspnea, LE edema, abdominal pain, vomiting, rash, or lethargy. Denies hx of asthma or COPD; denies tobacco use. Patient denies recent sick exposures. Patient has not been vaccinated for COVID-19. Patient has been taking Coricidin OTC for symptomatic relief. Able to eat & drink. Pt was just evaluated on 9/28, follows with ENT. ROS    Unless otherwise stated in this report or unable to obtain because of the patient's clinical or mental status as evidenced by the medical record, this patients's positive and negative responses for Review of Systems, constitutional, psych, eyes, ENT, cardiovascular, respiratory, gastrointestinal, neurological, genitourinary, musculoskeletal, integument systems and systems related to the presenting problem are either stated in the preceding or were not pertinent or were negative for the symptoms and/or complaints related to the medical problem. Past Medical History:  has a past medical history of Asthma, COPD (chronic obstructive pulmonary disease) ([address] Depression, Hyperlipidemia, and Hypertension. Past Surgical History:  has a past surgical history that includes Hysterectomy and Cataract removal with implant. Social History:  reports that she has never smoked. She has never used smokeless tobacco. She reports that she does not drink alcohol and does not use drugs. Family History: family history is not on file.    Allergies: Demerol hcl [meperidine] and Statins    Physical Exam         VS:  BP (!) 152/72   Pulse 61   Temp 97.3 °F (36.3 °C) (Temporal)   Resp 18   Ht 5' 6\" (1.676 m)   Wt 133 lb (60.3 kg)   SpO2 97%   BMI 21.47 kg/m²    Oxygen Saturation Interpretation: Normal.    Constitutional:  Alert, development consistent with age. NAD. Head:  NC/NT. Airway patent. Cerumen noted. Mouth: Posterior pharynx with mild erythema and clear postnasal drip. No tonsillar hypertrophy or exudate. Neck:  Normal ROM. Supple. No anterior cervical adenopathy noted. Lungs: CTAB without wheezes, rales, or rhonchi. CV:  Regular rate and rhythm, normal heart sounds, without pathological murmurs, ectopy, gallops, or rubs. Skin:  Normal turgor. Warm, dry, without visible rash. Lymphatic: No lymphangitis or adenopathy noted. Neurological:  Oriented. Motor functions intact. Lab / Imaging Results   (All laboratory and radiology results have been personally reviewed by myself)  Labs:  No results found for this visit on 10/03/22. Imaging: All Radiology results interpreted by Radiologist unless otherwise noted. No results found for this visit on 10/03/22. Assessment / Plan     Impression(s):  Emilee was seen today for cough. Diagnoses and all orders for this visit:    Sinobronchitis  -     POCT COVID-19 Rapid, NAAT (-) in office today  -     azithromycin (ZITHROMAX) 250 MG tablet; Take 1 tablet by mouth See Admin Instructions for 5 days 500mg on day 1 followed by 250mg on days 2 - 5  -     benzonatate (TESSALON PERLES) 100 MG capsule; Take 1 capsule by mouth 3 times daily as needed for Cough  -     predniSONE (DELTASONE) 10 MG tablet; Take 1 tablet by mouth 2 times daily for 5 days    - Red Flag items & conservative methods discussed including OTC methods & Coricidin medication    Advised cautionary self-quarantine at home in the interim. Increase fluids and rest. Symptomatic relief discussed including Tylenol prn pain/fever. Schedule virtual f/u with PCP in 7-10 days if symptoms persist. ED sooner if symptoms worsen or change.  ED immediately with high or refractory fever, progressive SOB, dyspnea, CP, calf pain/swelling, shaking chills, vomiting, abdominal pain, lethargy, flank pain, or decreased urinary output. Pt verbalizes understanding and is in agreement with plan of care. All questions answered. Anabel Rodriguez, APRN - CNP    **This report was transcribed using voice recognition software. Every effort was made to ensure accuracy; however, inadvertent computerized transcription errors may be present.

## 2022-10-04 DIAGNOSIS — F02.80 LATE ONSET ALZHEIMER'S DEMENTIA WITHOUT BEHAVIORAL DISTURBANCE (HCC): Primary | ICD-10-CM

## 2022-10-04 DIAGNOSIS — G30.1 LATE ONSET ALZHEIMER'S DEMENTIA WITHOUT BEHAVIORAL DISTURBANCE (HCC): Primary | ICD-10-CM

## 2022-10-04 DIAGNOSIS — M81.0 AGE-RELATED OSTEOPOROSIS WITHOUT CURRENT PATHOLOGICAL FRACTURE: ICD-10-CM

## 2022-10-04 DIAGNOSIS — R53.82 CHRONIC FATIGUE: ICD-10-CM

## 2022-10-05 NOTE — PROGRESS NOTES
Spoke with daughter Stacie Parents informed that Mami Kauffman is due for labs. Informed that labs are ordered.

## 2022-10-17 DIAGNOSIS — G31.84 MILD COGNITIVE IMPAIRMENT: ICD-10-CM

## 2022-10-17 RX ORDER — MEMANTINE HYDROCHLORIDE 5 MG/1
TABLET ORAL
Qty: 180 TABLET | Refills: 3 | OUTPATIENT
Start: 2022-10-17

## 2022-12-12 ENCOUNTER — OFFICE VISIT (OUTPATIENT)
Dept: GERIATRIC MEDICINE | Age: 87
End: 2022-12-12
Payer: MEDICARE

## 2022-12-12 VITALS
WEIGHT: 126.3 LBS | DIASTOLIC BLOOD PRESSURE: 68 MMHG | HEART RATE: 64 BPM | RESPIRATION RATE: 16 BRPM | BODY MASS INDEX: 21.04 KG/M2 | HEIGHT: 65 IN | SYSTOLIC BLOOD PRESSURE: 122 MMHG | TEMPERATURE: 97.5 F

## 2022-12-12 DIAGNOSIS — G30.9 ALZHEIMER DISEASE (HCC): Primary | ICD-10-CM

## 2022-12-12 DIAGNOSIS — F02.80 ALZHEIMER DISEASE (HCC): Primary | ICD-10-CM

## 2022-12-12 PROCEDURE — 1036F TOBACCO NON-USER: CPT | Performed by: INTERNAL MEDICINE

## 2022-12-12 PROCEDURE — G8484 FLU IMMUNIZE NO ADMIN: HCPCS | Performed by: INTERNAL MEDICINE

## 2022-12-12 PROCEDURE — G8420 CALC BMI NORM PARAMETERS: HCPCS | Performed by: INTERNAL MEDICINE

## 2022-12-12 PROCEDURE — 1090F PRES/ABSN URINE INCON ASSESS: CPT | Performed by: INTERNAL MEDICINE

## 2022-12-12 PROCEDURE — 99212 OFFICE O/P EST SF 10 MIN: CPT | Performed by: INTERNAL MEDICINE

## 2022-12-12 PROCEDURE — G8427 DOCREV CUR MEDS BY ELIG CLIN: HCPCS | Performed by: INTERNAL MEDICINE

## 2022-12-12 PROCEDURE — 1123F ACP DISCUSS/DSCN MKR DOCD: CPT | Performed by: INTERNAL MEDICINE

## 2022-12-12 RX ORDER — DONEPEZIL HYDROCHLORIDE 10 MG/1
10 TABLET, FILM COATED ORAL NIGHTLY
Qty: 90 TABLET | Refills: 3 | Status: SHIPPED | OUTPATIENT
Start: 2022-12-12

## 2022-12-12 RX ORDER — ACETAMINOPHEN 500 MG
500 TABLET ORAL 2 TIMES DAILY
COMMUNITY

## 2022-12-12 NOTE — PROGRESS NOTES
Chief Complaint   Patient presents with    6 Month Follow-Up     June follow up re:  SDAT. Here with daughter, Chadd Spencer. Per note from daughter -- (1) Pt is achy all the time - shoulders, knees, joints, muscles. (2) Short term memory is getting worse, sundowns. (3) Pt still takes care of her own daily care. Dr Cara Vega notified of above issues.

## 2022-12-12 NOTE — PROGRESS NOTES
CC Evaluate Dementia   Here with daughter  And no siblings living   And  x 4 years   Daughter close to her AND VISITS 3 X A DAY  SLEEPS WELL AT NIGHT  Great grandson IN same BUILDING  Knows she has 5 and not their names  IADL's micowave only  ADL's independent and showers with help once a week  IADL's bills checked  NO DRIVING for 6 years   Daughter does pills  Sundowning at night and looking for her home  Multiple phone calls of disoerintation  Rep questions and losing things  Memory a little vikas  Impression SDAT progressive and Depression  Plan: Aricept to 10 mg a day           Mirtazepine 15 mg hs and Namenda 20 mg a day           Zoloft 50 mg

## 2022-12-15 ENCOUNTER — TELEPHONE (OUTPATIENT)
Dept: ENT CLINIC | Age: 87
End: 2022-12-15

## 2022-12-15 NOTE — TELEPHONE ENCOUNTER
Received vm to cancel 12/15/22 apt and call back to r/s into the new year.  Can call daughter Shelbi Castellanos at [phone]

## 2022-12-16 NOTE — TELEPHONE ENCOUNTER
CODE stroke called per provider    Called Hetal Novoa and had to leave a message to have her call us back to schedule

## 2023-01-03 RX ORDER — MEMANTINE HYDROCHLORIDE 10 MG/1
TABLET ORAL
Qty: 180 TABLET | Refills: 1 | Status: SHIPPED | OUTPATIENT
Start: 2023-01-03

## 2023-01-03 NOTE — TELEPHONE ENCOUNTER
Last Appointment:  9/28/2022  Future Appointments   Date Time Provider Mikaela Denae   1/23/2023  2:00 PM Amor Mcneill [facility]   6/13/2023  1:15 PM Emmett Albert MD [facility]   10/2/2023  1:40 PM [facility]

## 2023-01-23 ENCOUNTER — OFFICE VISIT (OUTPATIENT)
Dept: ENT CLINIC | Age: 88
End: 2023-01-23
Payer: MEDICARE

## 2023-01-23 VITALS
BODY MASS INDEX: 20.99 KG/M2 | SYSTOLIC BLOOD PRESSURE: 142 MMHG | OXYGEN SATURATION: 95 % | HEART RATE: 58 BPM | WEIGHT: 126 LBS | DIASTOLIC BLOOD PRESSURE: 72 MMHG | HEIGHT: 65 IN

## 2023-01-23 DIAGNOSIS — J30.9 ALLERGIC RHINITIS, UNSPECIFIED SEASONALITY, UNSPECIFIED TRIGGER: ICD-10-CM

## 2023-01-23 DIAGNOSIS — H61.23 BILATERAL IMPACTED CERUMEN: Primary | ICD-10-CM

## 2023-01-23 PROCEDURE — G8427 DOCREV CUR MEDS BY ELIG CLIN: HCPCS | Performed by: NURSE PRACTITIONER

## 2023-01-23 PROCEDURE — 1036F TOBACCO NON-USER: CPT | Performed by: NURSE PRACTITIONER

## 2023-01-23 PROCEDURE — 69210 REMOVE IMPACTED EAR WAX UNI: CPT | Performed by: NURSE PRACTITIONER

## 2023-01-23 PROCEDURE — G8484 FLU IMMUNIZE NO ADMIN: HCPCS | Performed by: NURSE PRACTITIONER

## 2023-01-23 PROCEDURE — G8420 CALC BMI NORM PARAMETERS: HCPCS | Performed by: NURSE PRACTITIONER

## 2023-01-23 PROCEDURE — 1123F ACP DISCUSS/DSCN MKR DOCD: CPT | Performed by: NURSE PRACTITIONER

## 2023-01-23 PROCEDURE — 99213 OFFICE O/P EST LOW 20 MIN: CPT | Performed by: NURSE PRACTITIONER

## 2023-01-23 PROCEDURE — 1090F PRES/ABSN URINE INCON ASSESS: CPT | Performed by: NURSE PRACTITIONER

## 2023-01-23 ASSESSMENT — ENCOUNTER SYMPTOMS
SHORTNESS OF BREATH: 0
EYES NEGATIVE: 1
STRIDOR: 0
RHINORRHEA: 0
SINUS PRESSURE: 0
RESPIRATORY NEGATIVE: 1
SINUS PAIN: 0

## 2023-01-23 NOTE — PROGRESS NOTES
[facility] Otolaryngology  Dr. Julito Monahan. Deepa Monte Ms.Ed        Patient Name:  Angela Weathers  :  1929     CHIEF C/O:    Chief Complaint   Patient presents with    Other     Cerumen and allergies       HISTORY OBTAINED FROM:  patient, family member - son      HISTORY OF PRESENT ILLNESS:       Evan Reis is a 80y.o. year old female, here today for follow up of cerumen impaction and allergy symptoms. Patient was last seen in September for bilateral cerumen impactions and allergy symptoms which at that time she opted to use over-the-counter Claritin for. She is continued using this regimen with good control of her allergy symptoms. She denies any current rhinorrhea, postnasal drainage, congestion, sinus pain or pressure. She denies any ear pain or pressure. She does note mild decrease in her hearing at this time with her hearing aids in due to possible cerumen. She denies any ear pain or pressure. She denies any new dizziness.       Past Medical History:   Diagnosis Date    Asthma     COPD (chronic obstructive pulmonary disease) ([facility]     Depression     Hyperlipidemia     Hypertension      Past Surgical History:   Procedure Laterality Date    CATARACT REMOVAL WITH IMPLANT      right eye    HYSTERECTOMY (CERVIX STATUS UNKNOWN)         Current Outpatient Medications:     memantine (NAMENDA) 10 MG tablet, TAKE 1 TABLET BY MOUTH TWICE DAILY, Disp: 180 tablet, Rfl: 1    acetaminophen (TYLENOL) 500 MG tablet, Take 500 mg by mouth in the morning and at bedtime, Disp: , Rfl:     donepezil (ARICEPT) 10 MG tablet, Take 1 tablet by mouth nightly, Disp: 90 tablet, Rfl: 3    INCRUSE ELLIPTA 62.5 MCG/INH AEPB, INHALE 1 PUFF BY MOUTH INTO THE LUNGS DAILY, Disp: 30 each, Rfl: 2    meloxicam (MOBIC) 7.5 MG tablet, Take 1 tablet by mouth daily as needed for Pain, Disp: 90 tablet, Rfl: 1    sertraline (ZOLOFT) 50 MG tablet, Take 1 tablet by mouth daily, Disp: 90 tablet, Rfl: 3    donepezil (ARICEPT) 5 MG tablet, Take 1 tablet by mouth nightly, Disp: 90 tablet, Rfl: 3    omeprazole (PRILOSEC) 20 MG delayed release capsule, Take 1 capsule by mouth daily, Disp: 90 capsule, Rfl: 3    alendronate (FOSAMAX) 70 MG tablet, Take 1 tablet by mouth every 7 days, Disp: 12 tablet, Rfl: 3    mirtazapine (REMERON) 15 MG tablet, Take 1 tablet by mouth nightly, Disp: 90 tablet, Rfl: 3    oxyCODONE-Acetaminophen (PERCOCET PO), Take 1 tablet by mouth every 12 hours as needed, Disp: , Rfl:     SALINE NA, 1 spray by Nasal route as needed, Disp: , Rfl:     Misc Natural Products (LUTEIN 20 PO), Take 1 capsule by mouth daily Eye Vitamin, Disp: , Rfl:     Biotin 5000 MCG TABS, Take 1 tablet by mouth daily, Disp: , Rfl:     LORATADINE PO, Take 5 mg by mouth daily , Disp: , Rfl:     vitamin B-6 (PYRIDOXINE) 50 MG tablet, Take 50 mg by mouth daily, Disp: , Rfl:     Handicap Placard [facility], by Does not apply route Unable to ambulate more than 40 feet without difficulty Expires 10/14/2025, Disp: 1 each, Rfl: 0    meclizine (ANTIVERT) 12.5 MG tablet, Take 12.5 mg by mouth 3 times daily as needed, Disp: , Rfl:     magnesium oxide (MAG-OX) 400 MG tablet, Take 400 mg by mouth daily, Disp: , Rfl:     Cyanocobalamin (VITAMIN B 12 PO), Take 1,000 mcg by mouth daily , Disp: , Rfl:     vitamin D (CHOLECALCIFEROL) 1000 UNIT TABS tablet, Take 2,000 Units by mouth daily , Disp: , Rfl:     Biotin w/ Vitamins C & E (HAIR/SKIN/NAILS PO), Take 1 tablet by mouth daily , Disp: , Rfl:     Multiple Vitamins-Minerals (MULTIVITAMIN ADULT PO), Take 1 tablet by mouth daily , Disp: , Rfl:   Demerol hcl [meperidine] and Statins  Social History     Tobacco Use    Smoking status: Never    Smokeless tobacco: Never   Vaping Use    Vaping Use: Never used   Substance Use Topics    Alcohol use: No     Comment: 1 cup of coffee a day     Drug use: No     History reviewed. No pertinent family history. Review of Systems   Constitutional: Negative. Negative for activity change and appetite change.    HENT: Positive for hearing loss. Negative for congestion, ear discharge, ear pain, postnasal drip, rhinorrhea, sinus pressure and sinus pain. Eyes: Negative. Respiratory: Negative. Negative for shortness of breath and stridor. Cardiovascular: Negative. Negative for chest pain and palpitations. Endocrine: Negative. Musculoskeletal: Negative. Skin: Negative. Neurological:  Negative for dizziness. Hematological: Negative. Psychiatric/Behavioral: Negative. BP (!) 142/72 (Site: Right Upper Arm, Position: Sitting, Cuff Size: Medium Adult)   Pulse 58   Ht 5' 5\" (1.651 m)   Wt 126 lb (57.2 kg)   SpO2 95%   BMI 20.97 kg/m²   Physical Exam  Constitutional:       Appearance: Normal appearance. HENT:      Head: Normocephalic. Right Ear: Tympanic membrane, ear canal and external ear normal. Decreased hearing noted. There is impacted cerumen. Left Ear: Tympanic membrane, ear canal and external ear normal. Decreased hearing noted. There is impacted cerumen. Ears:        Nose:      Right Turbinates: Pale. Left Turbinates: Pale. Mouth/Throat:      Mouth: Mucous membranes are moist.      Pharynx: Oropharynx is clear. Eyes:      Conjunctiva/sclera: Conjunctivae normal.      Pupils: Pupils are equal, round, and reactive to light. Cardiovascular:      Rate and Rhythm: Normal rate and regular rhythm. Pulses: Normal pulses. Pulmonary:      Effort: Pulmonary effort is normal. No respiratory distress. Breath sounds: No stridor. Musculoskeletal:         General: Normal range of motion. Cervical back: Normal range of motion. No rigidity. No muscular tenderness. Skin:     General: Skin is warm and dry. Neurological:      General: No focal deficit present. Mental Status: She is alert and oriented to person, place, and time. Psychiatric:         Mood and Affect: Mood normal.         Behavior: Behavior normal.         Thought Content:  Thought content normal.         Judgment: Judgment normal.       IMPRESSION/PLAN:  Cerumen removal     Auditory canal(s) both ears partially obstructed with cerumen. Cerumen was gently removed using soft plastic curette. Tympanic membranes are intact following the procedure. Auditory canals appear normal.      Emilee was seen today for other. Diagnoses and all orders for this visit:    Bilateral impacted cerumen  -     SC REMOVAL IMPACTED CERUMEN INSTRUMENTATION UNILAT    Allergic rhinitis, unspecified seasonality, unspecified trigger    Bilateral partial cerumen impactions removed without difficulty. Patient tolerated well. At this time she will continue with her Claritin, 10 mg once daily for her allergy symptoms. She will follow-up in 6 months for repeat cerumen removal.  She is instructed to call with any new or worsening symptoms prior to her next appointment.       Bry Calero, MSN, FNP-C  [facility], Nose and Throat    The information contained in this note has been dictated using drug and medical speech recognition software and may contain errors

## 2023-01-24 DIAGNOSIS — J41.0 SIMPLE CHRONIC BRONCHITIS (HCC): ICD-10-CM

## 2023-01-24 RX ORDER — UMECLIDINIUM 62.5 UG/1
AEROSOL, POWDER ORAL
Qty: 30 EACH | Refills: 2 | Status: SHIPPED | OUTPATIENT
Start: 2023-01-24

## 2023-01-24 NOTE — TELEPHONE ENCOUNTER
Last Appointment:  9/28/2022  Future Appointments  1/30/2023  11:30 AM   Linsey Wells DO      [facility]         [facility]  6/13/2023  1:15 PM    Andres Mckinley MD      [facility]  7/24/2023  2:15 PM    RITIKA Davies - CNP [facility]         [facility]  10/2/2023  1:40 PM    MD Nona Batista Che        [facility]

## 2023-01-30 ENCOUNTER — OFFICE VISIT (OUTPATIENT)
Dept: PHYSICAL MEDICINE AND REHAB | Age: 88
End: 2023-01-30
Payer: MEDICARE

## 2023-01-30 VITALS
HEART RATE: 56 BPM | WEIGHT: 124 LBS | BODY MASS INDEX: 20.66 KG/M2 | HEIGHT: 65 IN | SYSTOLIC BLOOD PRESSURE: 146 MMHG | DIASTOLIC BLOOD PRESSURE: 82 MMHG

## 2023-01-30 DIAGNOSIS — F02.80 LATE ONSET ALZHEIMER'S DEMENTIA WITHOUT BEHAVIORAL DISTURBANCE (HCC): ICD-10-CM

## 2023-01-30 DIAGNOSIS — M81.0 AGE-RELATED OSTEOPOROSIS WITHOUT CURRENT PATHOLOGICAL FRACTURE: ICD-10-CM

## 2023-01-30 DIAGNOSIS — M79.609 PAIN IN EXTREMITY, UNSPECIFIED EXTREMITY: Primary | ICD-10-CM

## 2023-01-30 DIAGNOSIS — G30.1 LATE ONSET ALZHEIMER'S DEMENTIA WITHOUT BEHAVIORAL DISTURBANCE (HCC): ICD-10-CM

## 2023-01-30 DIAGNOSIS — M53.3 SACROILIAC DYSFUNCTION: ICD-10-CM

## 2023-01-30 DIAGNOSIS — R53.82 CHRONIC FATIGUE: ICD-10-CM

## 2023-01-30 LAB
ALBUMIN SERPL-MCNC: 4.4 G/DL (ref 3.5–5.2)
ALP BLD-CCNC: 75 U/L (ref 35–104)
ALT SERPL-CCNC: 16 U/L (ref 0–32)
ANION GAP SERPL CALCULATED.3IONS-SCNC: 12 MMOL/L (ref 7–16)
AST SERPL-CCNC: 19 U/L (ref 0–31)
BASOPHILS ABSOLUTE: 0.03 E9/L (ref 0–0.2)
BASOPHILS RELATIVE PERCENT: 0.4 % (ref 0–2)
BILIRUB SERPL-MCNC: 0.4 MG/DL (ref 0–1.2)
BUN BLDV-MCNC: 12 MG/DL (ref 6–23)
CALCIUM SERPL-MCNC: 9.9 MG/DL (ref 8.6–10.2)
CHLORIDE BLD-SCNC: 100 MMOL/L (ref 98–107)
CO2: 27 MMOL/L (ref 22–29)
CREAT SERPL-MCNC: 0.6 MG/DL (ref 0.5–1)
EOSINOPHILS ABSOLUTE: 0.07 E9/L (ref 0.05–0.5)
EOSINOPHILS RELATIVE PERCENT: 1 % (ref 0–6)
GFR SERPL CREATININE-BSD FRML MDRD: >60 ML/MIN/1.73
GLUCOSE BLD-MCNC: 76 MG/DL (ref 74–99)
HCT VFR BLD CALC: 42.8 % (ref 34–48)
HEMOGLOBIN: 14 G/DL (ref 11.5–15.5)
IMMATURE GRANULOCYTES #: 0.02 E9/L
IMMATURE GRANULOCYTES %: 0.3 % (ref 0–5)
LYMPHOCYTES ABSOLUTE: 1.66 E9/L (ref 1.5–4)
LYMPHOCYTES RELATIVE PERCENT: 23.8 % (ref 20–42)
MCH RBC QN AUTO: 29.3 PG (ref 26–35)
MCHC RBC AUTO-ENTMCNC: 32.7 % (ref 32–34.5)
MCV RBC AUTO: 89.5 FL (ref 80–99.9)
MONOCYTES ABSOLUTE: 0.47 E9/L (ref 0.1–0.95)
MONOCYTES RELATIVE PERCENT: 6.7 % (ref 2–12)
NEUTROPHILS ABSOLUTE: 4.73 E9/L (ref 1.8–7.3)
NEUTROPHILS RELATIVE PERCENT: 67.8 % (ref 43–80)
PDW BLD-RTO: 13.4 FL (ref 11.5–15)
PLATELET # BLD: 208 E9/L (ref 130–450)
PMV BLD AUTO: 11.4 FL (ref 7–12)
POTASSIUM SERPL-SCNC: 4.5 MMOL/L (ref 3.5–5)
RBC # BLD: 4.78 E12/L (ref 3.5–5.5)
SODIUM BLD-SCNC: 139 MMOL/L (ref 132–146)
TOTAL PROTEIN: 6.9 G/DL (ref 6.4–8.3)
WBC # BLD: 7 E9/L (ref 4.5–11.5)

## 2023-01-30 PROCEDURE — G8420 CALC BMI NORM PARAMETERS: HCPCS | Performed by: PHYSICAL MEDICINE & REHABILITATION

## 2023-01-30 PROCEDURE — G8484 FLU IMMUNIZE NO ADMIN: HCPCS | Performed by: PHYSICAL MEDICINE & REHABILITATION

## 2023-01-30 PROCEDURE — 1123F ACP DISCUSS/DSCN MKR DOCD: CPT | Performed by: PHYSICAL MEDICINE & REHABILITATION

## 2023-01-30 PROCEDURE — G8427 DOCREV CUR MEDS BY ELIG CLIN: HCPCS | Performed by: PHYSICAL MEDICINE & REHABILITATION

## 2023-01-30 PROCEDURE — 1036F TOBACCO NON-USER: CPT | Performed by: PHYSICAL MEDICINE & REHABILITATION

## 2023-01-30 PROCEDURE — 1090F PRES/ABSN URINE INCON ASSESS: CPT | Performed by: PHYSICAL MEDICINE & REHABILITATION

## 2023-01-30 PROCEDURE — 20552 NJX 1/MLT TRIGGER POINT 1/2: CPT | Performed by: PHYSICAL MEDICINE & REHABILITATION

## 2023-01-30 PROCEDURE — 99214 OFFICE O/P EST MOD 30 MIN: CPT | Performed by: PHYSICAL MEDICINE & REHABILITATION

## 2023-01-30 RX ORDER — TRIAMCINOLONE ACETONIDE 40 MG/ML
40 INJECTION, SUSPENSION INTRA-ARTICULAR; INTRAMUSCULAR ONCE
Status: COMPLETED | OUTPATIENT
Start: 2023-01-30 | End: 2023-01-30

## 2023-01-30 RX ORDER — LIDOCAINE HYDROCHLORIDE 10 MG/ML
4 INJECTION, SOLUTION INFILTRATION; PERINEURAL ONCE
Status: COMPLETED | OUTPATIENT
Start: 2023-01-30 | End: 2023-01-30

## 2023-01-30 RX ORDER — ACETAMINOPHEN WITH CODEINE 300MG-15MG
1 TABLET ORAL EVERY 6 HOURS PRN
Qty: 20 TABLET | Refills: 0 | Status: SHIPPED | OUTPATIENT
Start: 2023-01-30 | End: 2023-03-01

## 2023-01-30 RX ADMIN — TRIAMCINOLONE ACETONIDE 40 MG: 40 INJECTION, SUSPENSION INTRA-ARTICULAR; INTRAMUSCULAR at 12:37

## 2023-01-30 RX ADMIN — LIDOCAINE HYDROCHLORIDE 4 ML: 10 INJECTION, SOLUTION INFILTRATION; PERINEURAL at 12:37

## 2023-01-30 NOTE — PROGRESS NOTES
Sonja Cowart D.O. [facility]  [address] [facility] [place]  Phone: [phone]  Fax: [phone]        1/30/23    Chief Complaint   Patient presents with    Hip Pain     Right si injection        HPI:  Jacinta Marcus is a 80y.o. year old woman seen today in follow up regarding back pain. Interval history: Since the last visit the patient has continued right buttock and low back pain. She has been taking Mobic daily but does not feel like it is helping. She also uses Tylenol as needed. Today, the pain is rated Pain Score:   7 where 0 is no pain and 10 is pain as bad as it can be. The pain is located in the right buttock and right mid back,  does not radiate  and is described as sharp. This pain occurs all day. The symptoms have been worse since onset. Symptoms are exacerbated by moving, breathing. Factors which relieve the pain include nothing. Other associated symptoms include stiffness. Otherwise, the pain assessment has not changed since the last visit. Past Medical History:   Diagnosis Date    Asthma     COPD (chronic obstructive pulmonary disease) ([facility] [address]     Depression     Hyperlipidemia     Hypertension        Past Surgical History:   Procedure Laterality Date    CATARACT REMOVAL WITH IMPLANT      right eye    HYSTERECTOMY (CERVIX STATUS UNKNOWN)         Social History     Tobacco Use    Smoking status: Never    Smokeless tobacco: Never   Vaping Use    Vaping Use: Never used   Substance Use Topics    Alcohol use: No     Comment: 1 cup of coffee a day     Drug use: No       No family history on file. Current Outpatient Medications   Medication Sig Dispense Refill    acetaminophen-codeine (TYLENOL #2) 300-15 MG per tablet Take 1 tablet by mouth every 6 hours as needed for Pain for up to 30 days.  20 tablet 0    INCRUSE ELLIPTA 62.5 MCG/ACT AEPB INHALE 1 PUFF INTO THE LUNGS DAILY 30 each 2    memantine (NAMENDA) 10 MG tablet TAKE 1 TABLET BY MOUTH TWICE DAILY 180 tablet 1    acetaminophen (TYLENOL) 500 MG tablet Take 500 mg by mouth in the morning and at bedtime      donepezil (ARICEPT) 10 MG tablet Take 1 tablet by mouth nightly 90 tablet 3    sertraline (ZOLOFT) 50 MG tablet Take 1 tablet by mouth daily 90 tablet 3    omeprazole (PRILOSEC) 20 MG delayed release capsule Take 1 capsule by mouth daily 90 capsule 3    alendronate (FOSAMAX) 70 MG tablet Take 1 tablet by mouth every 7 days 12 tablet 3    mirtazapine (REMERON) 15 MG tablet Take 1 tablet by mouth nightly 90 tablet 3    SALINE NA 1 spray by Nasal route as needed      Misc Natural Products (LUTEIN 20 PO) Take 1 capsule by mouth daily Eye Vitamin      Biotin 5000 MCG TABS Take 1 tablet by mouth daily      LORATADINE PO Take 5 mg by mouth daily       vitamin B-6 (PYRIDOXINE) 50 MG tablet Take 50 mg by mouth daily      Handicap Placard MISC by Does not apply route Unable to ambulate more than 40 feet without difficulty  Expires 10/14/2025 1 each 0    meclizine (ANTIVERT) 12.5 MG tablet Take 12.5 mg by mouth 3 times daily as needed      magnesium oxide (MAG-OX) 400 MG tablet Take 400 mg by mouth daily      Cyanocobalamin (VITAMIN B 12 PO) Take 1,000 mcg by mouth daily       vitamin D (CHOLECALCIFEROL) 1000 UNIT TABS tablet Take 2,000 Units by mouth daily       Biotin w/ Vitamins C & E (HAIR/SKIN/NAILS PO) Take 1 tablet by mouth daily       Multiple Vitamins-Minerals (MULTIVITAMIN ADULT PO) Take 1 tablet by mouth daily       donepezil (ARICEPT) 5 MG tablet Take 1 tablet by mouth nightly 90 tablet 3    oxyCODONE-Acetaminophen (PERCOCET PO) Take 1 tablet by mouth every 12 hours as needed       No current facility-administered medications for this visit. Allergies   Allergen Reactions    Demerol Hcl [Meperidine]      Other reaction(s):  Intolerance  \"went into shok\"    Statins Other (See Comments)       Review of Systems:  No new weakness, paresthesia, incontinence of bowel or bladder, saddle anesthesia, falls or gait dysfunction. Otherwise, per HPI. Physical Exam:   Blood pressure (!) 146/82, pulse 56, height 5' 5\" (1.651 m), weight 124 lb (56.2 kg), not currently breastfeeding. GENERAL: The patient is in no apparent distress. Body habitus is non-obese. MSK: There is no joint effusion, deformity, instability, swelling, erythema or warmth. AROM is unable to test due to pain. Spinal curvatures reveal increased thoracic kyphosis. Exquisite tenderness right SI joint. NEURO: Gait is normal. No focal sensorimotor deficit. Reflexes 2+ and symmetric in lower extremities. Impression:   1. Pain in extremity, unspecified extremity    2. Sacroiliac dysfunction        Plan:  Orders Placed This Encounter   Procedures    US GUIDED NEEDLE PLACEMENT     Order Specific Question:   Will this be performed in the office today? If yes, the order will immediately fall to your reading worklist where you can document your result. Answer:   Yes    US GUIDED NEEDLE PLACEMENT     Standing Status:   Future     Standing Expiration Date:   1/30/2024     Order Specific Question:   Will this be performed in the office today? If yes, the order will immediately fall to your reading worklist where you can document your result. Answer:   No    NH INJECTION SINGLE/MLT TRIGGER POINT 1/2 MUSCLES   The visit has included the decision to proceed with a minor procedure: US guided sacroiliac joint injection. Controlled Substance Monitoring:    Acute and Chronic Pain Monitoring:   RX Monitoring 1/30/2023   Periodic Controlled Substance Monitoring No signs of potential drug abuse or diversion identified. Orders Placed This Encounter   Medications    acetaminophen-codeine (TYLENOL #2) 300-15 MG per tablet     Sig: Take 1 tablet by mouth every 6 hours as needed for Pain for up to 30 days.      Dispense:  20 tablet     Refill:  0     Reduce doses taken as pain becomes manageable    triamcinolone acetonide (KENALOG-40) injection 40 mg    lidocaine 1 % injection 4 mL     Stop Mobic due to concern about CV risk and ineffectiveness. Then try Tylenol 500 mg every 6 hours as needed for moderate pain. For severe pain can take Tylenol #2 instead of over the counter Tylenol. If the Tylenol alone is not enough, stop Tylenol and start Advil Dual Action up to 2 tabs every 6 hours as needed for pain. Do not take plain Tylenol with this medication as it has Tylenol in it. For severe pain can, she can still take Tylenol #2 instead of over the counter Tylenol. If the pain still is not controlled, advised to call office and we will replace with a prescription NSAID. The patient was educated about the diagnosis, prognosis, indications, risks and benefits of treatment. An opportunity to ask questions was given to the patient and questions were answered. The patient agreed to proceed with the recommended treatment as described above. Return in about 3 months (around 4/30/2023), or if symptoms worsen or fail to improve. Thank you for allowing me to participate in the care of your patient. Madhav Packer D.O., P.T. Board Certified Physical Medicine and Rehabilitation  Board Certified [facility]. [facility] [facility]. [facility]  [address]. [facility] [facility]  Phone: [phone]  Fax: [phone]    1/30/2023    Chief Complaint   Patient presents with    Hip Pain     Right si injection        Last injection: 9/27/21  Taking anticoagulants/antiplatelets: No  Diabetic: No  Febrile/active infection: No    Ambulatory Procedure Time Out  Correct Patient: Yes  Correct Procedure: Yes  Correct Site/Side: Yes  Correct Site(s) Marked: Yes  Informed Consent Signed:  Yes  Allergies Verified: Yes  Staff Present & Credential[de-identified] Minna Umaña, [facility],     After explaining the indications, risks, benefits and alternatives of a right sacroiliac joint injection, the patient agreed to proceed. A permit was signed and scanned into the chart. The patient was placed in the prone position and draped for modesty. The skin on the Right upper buttock was prepared with Chloraprep. Using aseptic no touch technique, a 22 gauge, 3\" needle with 1 cc of Kenalog 40mg/cc and 1 cc of 1% lidocaine was directed to the joint using ultrasound guidance. After negative aspiration, the medication was injected. Adequate hemostasis was achieved and a bandage applied. The patient tolerated the procedure well and was educated in post injection care. The patient was clinically monitored after the injection and left the office without incident. There was post injection reduction in pain. Ultrasound images are scanned in the electronic medical record separately. Cierra Clancy D.O., P.T.   Board Certified Physical Medicine and Rehabilitation  Board Certified Electrodiagnostic Medicine    Administrations This Visit       lidocaine 1 % injection 4 mL       Admin Date  01/30/2023  12:37 Action  Given Dose  4 mL Route  Other Site   Administered By  Anita Simms MA    Ordering Provider: Walter Ang DO    [address]: [id number]    Lot#: [id number]    OZZLDAUSEWFX: [address]    Patient Supplied?: No              triamcinolone acetonide (KENALOG-40) injection 40 mg       Admin Date  01/30/2023  12:37 Action  Given Dose  40 mg Route  Intra-artICUlar Site   Administered By  Anita Simms MA    Ordering Provider: Walter Ang DO    NDC: 0896-7403-43    Lot#: [id number]    : B-M SQUIBB U.S. (PRIMARY CARE)    Patient Supplied?: No

## 2023-01-30 NOTE — PATIENT INSTRUCTIONS
Stop Mobic. Then try Tylenol 500 mg every 6 hours as needed for mild to moderate pain. For severe pain can take Tylenol #2 instead of over the counter Tylenol. If they Tylenol alone is not enough, stop Tylenol and start Advil Dual Action up to 2 tabs every 6 hours as needed for pain. Do not take plain Tylenol with this medication as it has Tylenol in it. For severe pain can still take Tylenol #2 instead of over the counter Tylenol. If the pain still is not controlled, call office and we will replace with a prescription NSAID. You had an injection performed by Dr. Emiliano Fleming today. The procedure involved placing a needle into the tissues to inject a corticosteroid into the painful area. Please follow these instructions:     Recurring pain: Injections are usually done using a local anesthetic such as lidocaine and a corticosteroid such as Kenalog. The numbing effect of the lidocaine usually lasts for an hour and then wears off. Improvement in pain from the steroid usually takes 24-48 hours. So, expect the pain to return after an hour and hopefully to go away in 1-2 days. Rest the Area: Be careful with the affected area or joint. Usually the injected medicine causes the area to feel numb. Because you may not feel pain, it is very easy to cause further injury to the area. Do not use the area for anything more than mild essential movements for the next week. Watch for Infection: Although every precaution has been taken to prevent infection, be alert for the following signs: fever above 100 degrees, increased warmth in the area injected, redness at the injection site, redness spreading up your arm or leg, and swelling in the area. If any of theses symptoms develop, call this office immediately at [phone]. For soreness from the injection, you may apply ice to the area every 4 hours for 20 minutes at a time for the next 2 days.

## 2023-02-06 ENCOUNTER — TELEPHONE (OUTPATIENT)
Dept: PHYSICAL MEDICINE AND REHAB | Age: 88
End: 2023-02-06

## 2023-03-01 ENCOUNTER — OFFICE VISIT (OUTPATIENT)
Dept: FAMILY MEDICINE CLINIC | Age: 88
End: 2023-03-01
Payer: MEDICARE

## 2023-03-01 VITALS
WEIGHT: 123 LBS | SYSTOLIC BLOOD PRESSURE: 134 MMHG | HEART RATE: 52 BPM | OXYGEN SATURATION: 95 % | BODY MASS INDEX: 19.77 KG/M2 | TEMPERATURE: 97.2 F | RESPIRATION RATE: 20 BRPM | DIASTOLIC BLOOD PRESSURE: 69 MMHG | HEIGHT: 66 IN

## 2023-03-01 DIAGNOSIS — H02.005 ENTROPION OF LEFT LOWER EYELID: ICD-10-CM

## 2023-03-01 DIAGNOSIS — Z01.818 PREOP EXAMINATION: Primary | ICD-10-CM

## 2023-03-01 PROBLEM — I47.1 SVT (SUPRAVENTRICULAR TACHYCARDIA) (HCC): Status: ACTIVE | Noted: 2020-10-02

## 2023-03-01 PROBLEM — E78.5 HYPERLIPIDEMIA: Status: ACTIVE | Noted: 2020-08-28

## 2023-03-01 PROBLEM — I10 HYPERTENSION: Status: ACTIVE | Noted: 2020-10-02

## 2023-03-01 PROBLEM — I47.10 SVT (SUPRAVENTRICULAR TACHYCARDIA): Status: ACTIVE | Noted: 2020-10-02

## 2023-03-01 PROCEDURE — 1123F ACP DISCUSS/DSCN MKR DOCD: CPT | Performed by: FAMILY MEDICINE

## 2023-03-01 PROCEDURE — 1036F TOBACCO NON-USER: CPT | Performed by: FAMILY MEDICINE

## 2023-03-01 PROCEDURE — G8428 CUR MEDS NOT DOCUMENT: HCPCS | Performed by: FAMILY MEDICINE

## 2023-03-01 PROCEDURE — 1090F PRES/ABSN URINE INCON ASSESS: CPT | Performed by: FAMILY MEDICINE

## 2023-03-01 PROCEDURE — G8420 CALC BMI NORM PARAMETERS: HCPCS | Performed by: FAMILY MEDICINE

## 2023-03-01 PROCEDURE — G8484 FLU IMMUNIZE NO ADMIN: HCPCS | Performed by: FAMILY MEDICINE

## 2023-03-01 PROCEDURE — 99213 OFFICE O/P EST LOW 20 MIN: CPT | Performed by: FAMILY MEDICINE

## 2023-03-01 SDOH — ECONOMIC STABILITY: INCOME INSECURITY: IN THE LAST 12 MONTHS, WAS THERE A TIME WHEN YOU WERE NOT ABLE TO PAY THE MORTGAGE OR RENT ON TIME?: NO

## 2023-03-01 SDOH — ECONOMIC STABILITY: FOOD INSECURITY: WITHIN THE PAST 12 MONTHS, THE FOOD YOU BOUGHT JUST DIDN'T LAST AND YOU DIDN'T HAVE MONEY TO GET MORE.: NEVER TRUE

## 2023-03-01 SDOH — ECONOMIC STABILITY: FOOD INSECURITY: WITHIN THE PAST 12 MONTHS, YOU WORRIED THAT YOUR FOOD WOULD RUN OUT BEFORE YOU GOT MONEY TO BUY MORE.: NEVER TRUE

## 2023-03-01 ASSESSMENT — ENCOUNTER SYMPTOMS
WHEEZING: 0
SHORTNESS OF BREATH: 0
COUGH: 0
ABDOMINAL PAIN: 0

## 2023-03-01 ASSESSMENT — PATIENT HEALTH QUESTIONNAIRE - PHQ9
2. FEELING DOWN, DEPRESSED OR HOPELESS: 0
SUM OF ALL RESPONSES TO PHQ QUESTIONS 1-9: 0
SUM OF ALL RESPONSES TO PHQ9 QUESTIONS 1 & 2: 0
SUM OF ALL RESPONSES TO PHQ QUESTIONS 1-9: 0
1. LITTLE INTEREST OR PLEASURE IN DOING THINGS: 0
SUM OF ALL RESPONSES TO PHQ QUESTIONS 1-9: 0
SUM OF ALL RESPONSES TO PHQ QUESTIONS 1-9: 0

## 2023-03-01 ASSESSMENT — SOCIAL DETERMINANTS OF HEALTH (SDOH): HOW HARD IS IT FOR YOU TO PAY FOR THE VERY BASICS LIKE FOOD, HOUSING, MEDICAL CARE, AND HEATING?: NOT HARD AT ALL

## 2023-03-01 ASSESSMENT — LIFESTYLE VARIABLES: HOW OFTEN DO YOU HAVE A DRINK CONTAINING ALCOHOL: NEVER

## 2023-03-01 NOTE — PROGRESS NOTES
[facility]  [address], [address]  Markel Mensah MD     Patient: Albert Cooley  YOB: 1929  Visit Date: 3/1/23    Lian Campa is a 80y.o. year old female here today for   Chief Complaint   Patient presents with    Pre-op Exam       HPI  Patient is a 80year old female here for pre op visit   Eyelashes are growing inward because of her eyelid. Was getting infections from rubbing it. Tear drop is also blocked. She will be having a surgery on left lower eyelid. Left lower eylid entropion repair. MAC sedation. Procedure will happen in their office. Dr. Danielson Me. Stopped the mobic . Was still having pain so Dr. Zenia Navarrete started tylenol #2 with codeine. No chest pain . Some shortness of breath with exertion. Uses incrus ellipta inhaler. No dizziness or lightheadedness. Balance is a little off , has to take a minute when standing up before she starts moving. Review of Systems   Constitutional:  Negative for chills and fever. Respiratory:  Negative for cough, shortness of breath and wheezing. Cardiovascular:  Negative for chest pain, palpitations and leg swelling. Gastrointestinal:  Negative for abdominal pain. Neurological:  Negative for dizziness and light-headedness. Current Outpatient Medications on File Prior to Visit   Medication Sig Dispense Refill    acetaminophen-codeine (TYLENOL #2) 300-15 MG per tablet Take 1 tablet by mouth every 6 hours as needed for Pain for up to 30 days.  20 tablet 0    INCRUSE ELLIPTA 62.5 MCG/ACT AEPB INHALE 1 PUFF INTO THE LUNGS DAILY 30 each 2    memantine (NAMENDA) 10 MG tablet TAKE 1 TABLET BY MOUTH TWICE DAILY 180 tablet 1    donepezil (ARICEPT) 10 MG tablet Take 1 tablet by mouth nightly 90 tablet 3    sertraline (ZOLOFT) 50 MG tablet Take 1 tablet by mouth daily 90 tablet 3    omeprazole (PRILOSEC) 20 MG delayed release capsule Take 1 capsule by mouth daily 90 capsule 3    alendronate (FOSAMAX) 70 MG tablet Take 1 tablet by mouth every 7 days 12 tablet 3    mirtazapine (REMERON) 15 MG tablet Take 1 tablet by mouth nightly 90 tablet 3    SALINE NA 1 spray by Nasal route as needed      Misc Natural Products (LUTEIN 20 PO) Take 1 capsule by mouth daily Eye Vitamin      Biotin 5000 MCG TABS Take 1 tablet by mouth daily      LORATADINE PO Take 5 mg by mouth daily       vitamin B-6 (PYRIDOXINE) 50 MG tablet Take 50 mg by mouth daily      Handicap Placard MISC by Does not apply route Unable to ambulate more than 40 feet without difficulty  Expires 10/14/2025 1 each 0    meclizine (ANTIVERT) 12.5 MG tablet Take 12.5 mg by mouth 3 times daily as needed      magnesium oxide (MAG-OX) 400 MG tablet Take 400 mg by mouth daily      Cyanocobalamin (VITAMIN B 12 PO) Take 1,000 mcg by mouth daily       vitamin D (CHOLECALCIFEROL) 1000 UNIT TABS tablet Take 2,000 Units by mouth daily       Biotin w/ Vitamins C & E (HAIR/SKIN/NAILS PO) Take 1 tablet by mouth daily       Multiple Vitamins-Minerals (MULTIVITAMIN ADULT PO) Take 1 tablet by mouth daily        No current facility-administered medications on file prior to visit. Allergies   Allergen Reactions    Demerol Hcl [Meperidine]      Other reaction(s): Intolerance  \"went into shok\"    Statins Other (See Comments)       Past medical, surgical, socialand/or family history reviewed, updated as needed, and are non-contributory (unless otherwise stated). Medications, allergies, and problem list also reviewed and updated as needed in patient's record. Wt Readings from Last 3 Encounters:   03/01/23 123 lb (55.8 kg)   01/30/23 124 lb (56.2 kg)   01/23/23 126 lb (57.2 kg)                   /69   Pulse 52   Temp 97.2 °F (36.2 °C) (Temporal)   Resp 20   Ht 5' 5.5\" (1.664 m)   Wt 123 lb (55.8 kg)   SpO2 95%   BMI 20.16 kg/m²       Physical Exam  Vitals and nursing note reviewed. Constitutional:       Appearance: She is well-developed.    Cardiovascular:      Rate and Rhythm: Normal rate and regular rhythm. Heart sounds: Normal heart sounds. No murmur heard. No friction rub. Pulmonary:      Effort: Pulmonary effort is normal. No respiratory distress. Breath sounds: Normal breath sounds. No stridor. No wheezing or rales. Abdominal:      General: Bowel sounds are normal. There is no distension. Palpations: Abdomen is soft. There is no mass. Tenderness: There is no abdominal tenderness. There is no guarding or rebound. Musculoskeletal:         General: Normal range of motion. Right lower leg: No edema. Left lower leg: No edema. ASSESSMENT/PLAN  Emilee was seen today for pre-op exam.    Diagnoses and all orders for this visit:    Preop examination   - No chest pain. Vital signs and exam within normal limits    Low risk for low risk procedure   . No further testing needed prior to proposed procedure. Entropion of left lower eyelid   Plan for repair under MAC sedation         Phone/MyChart follow up if tests abnormal.    Return in about 4 months (around 7/1/2023). or sooner if necessary. I have reviewed myfindings and recommendations with [place]. Annette Lopez.  Jerry Borrego MD, M.D

## 2023-03-16 ENCOUNTER — OFFICE VISIT (OUTPATIENT)
Dept: PHYSICAL MEDICINE AND REHAB | Age: 88
End: 2023-03-16
Payer: MEDICARE

## 2023-03-16 VITALS
DIASTOLIC BLOOD PRESSURE: 77 MMHG | HEIGHT: 65 IN | BODY MASS INDEX: 20.49 KG/M2 | SYSTOLIC BLOOD PRESSURE: 133 MMHG | WEIGHT: 123 LBS

## 2023-03-16 DIAGNOSIS — M54.9 MID BACK PAIN: Primary | ICD-10-CM

## 2023-03-16 PROCEDURE — 99214 OFFICE O/P EST MOD 30 MIN: CPT | Performed by: PHYSICAL MEDICINE & REHABILITATION

## 2023-03-16 PROCEDURE — 1090F PRES/ABSN URINE INCON ASSESS: CPT | Performed by: PHYSICAL MEDICINE & REHABILITATION

## 2023-03-16 PROCEDURE — G8427 DOCREV CUR MEDS BY ELIG CLIN: HCPCS | Performed by: PHYSICAL MEDICINE & REHABILITATION

## 2023-03-16 PROCEDURE — 1123F ACP DISCUSS/DSCN MKR DOCD: CPT | Performed by: PHYSICAL MEDICINE & REHABILITATION

## 2023-03-16 PROCEDURE — G8484 FLU IMMUNIZE NO ADMIN: HCPCS | Performed by: PHYSICAL MEDICINE & REHABILITATION

## 2023-03-16 PROCEDURE — 1036F TOBACCO NON-USER: CPT | Performed by: PHYSICAL MEDICINE & REHABILITATION

## 2023-03-16 PROCEDURE — G8420 CALC BMI NORM PARAMETERS: HCPCS | Performed by: PHYSICAL MEDICINE & REHABILITATION

## 2023-03-16 RX ORDER — ERYTHROMYCIN 5 MG/G
OINTMENT OPHTHALMIC
COMMUNITY
Start: 2023-03-13

## 2023-03-16 RX ORDER — ACETAMINOPHEN WITH CODEINE 300MG-15MG
1 TABLET ORAL EVERY 6 HOURS PRN
Qty: 20 TABLET | Refills: 0 | Status: SHIPPED | OUTPATIENT
Start: 2023-03-16 | End: 2023-04-15

## 2023-03-16 NOTE — PROGRESS NOTES
Vivian Cruz D.O. [facility]  [address] [facility] [place]  Phone: [phone]  Fax: [phone]    Chief Complaint   Patient presents with    Shoulder Pain     Right shoulder     An independent historian was needed. Interval history: The patient has a new right scapular pain after no new injury. Symptoms have been improving. The patient has completed the following treatments since last seen: Tylenol    The patient's condition is unstable and currently Acute on Chronic. Today, the location of pain is right thoracic region and the severity is Pain Score:   1. The quality is sharp. The frequency is episodic daily. Alleviating factors include: Tylenol. Exacerbating factors include:  moving arm    The ongoing functional problems include:     ADL: Partial assistance    Mobility:  furniture walking, has a walker      Exercise: never    Red flags: Not present: history of cancer, pain awakening patient from sleep, night sweats, fevers, unintentional weight loss, immunospuression, saddle anesthesia, new bowel or bladder dysfunction, and osteoporosis. Associated symptoms: Not present: falls, subjective weakness, paresthesias, hematuria, nausea, vomiting or rash    Data reviewed/prior work up:    Review of labs:   Lab Results   Component Value Date     01/30/2023    K 4.5 01/30/2023     01/30/2023    CO2 27 01/30/2023    BUN 12 01/30/2023    CREATININE 0.6 01/30/2023    GLUCOSE 76 01/30/2023    CALCIUM 9.9 01/30/2023    PROT 6.9 01/30/2023    LABALBU 4.4 01/30/2023    BILITOT 0.4 01/30/2023    ALKPHOS 75 01/30/2023    AST 19 01/30/2023    ALT 16 01/30/2023    LABGLOM >60 01/30/2023    GFRAA >60 11/29/2021     Past medical, surgical, family, social history, allergies and medications were otherwise reviewed in Epic.       Physical Exam:   Blood pressure 133/77, height 5' 5\" (1.651 m), weight 123 lb (55.8 kg), not currently breastfeeding. General: No apparent distress. Habitus: Body mass index is 20.47 kg/m². Normal weight (18.5-24. 9)        MSK: Tenderness to palpation right inferior angle scapula, right mid lateral ribs, midline thoracic spine at apex of severe kyphosis. Neurologic:  No focal sensorimotor deficit. Reflexes 2+ and symmetric. Gait is unsteady. Impression:   1. Mid back pain      unstable  Acute on Chronic  Prognosis: Good    Education regarding the risks, benefits and alternatives of treatment were discussed including: Continue TYlenol as needed for mild to moderate pain and tylenol with codeine for severe pain. Continue Salon pas patches  Risks of treatment were discussed including the increased risk with the following co-morbidities long term cardiovascular risks with NSAID. Shared decision making today regarding patient also chooses to proceed with:   Orders Placed This Encounter   Procedures    XR THORACIC SPINE (2 VIEWS)     Standing Status:   Future     Standing Expiration Date:   3/16/2024    XR SHOULDER RIGHT (MIN 2 VIEWS)     Standing Status:   Future     Standing Expiration Date:   3/16/2024    XR RIBS RIGHT INCLUDE CHEST (MIN 3 VIEWS)     Standing Status:   Future     Standing Expiration Date:   3/16/2024     Prescription drug management has included:   Orders Placed This Encounter   Medications    acetaminophen-codeine (TYLENOL #2) 300-15 MG per tablet     Sig: Take 1 tablet by mouth every 6 hours as needed for Pain for up to 30 days. Dispense:  20 tablet     Refill:  0     Reduce doses taken as pain becomes manageable     Medications Discontinued During This Encounter   Medication Reason    acetaminophen-codeine (TYLENOL #2) 300-15 MG per tablet REORDER   Controlled Substance Monitoring:    Acute and Chronic Pain Monitoring:   RX Monitoring 3/16/2023   Periodic Controlled Substance Monitoring No signs of potential drug abuse or diversion identified.      Exercise options discussed and encouraged. Recommended PT, declined so home exercises were  provided pending xray. Activity modifications discussed and recommended    Return in about 6 months (around 9/16/2023). Vivian Cruz D.O., P.T.   Board Certified Physical Medicine and Rehabilitation  Board Certified Electrodiagnostic Medicine

## 2023-03-17 ENCOUNTER — TELEPHONE (OUTPATIENT)
Dept: PHYSICAL MEDICINE AND REHAB | Age: 88
End: 2023-03-17

## 2023-03-17 NOTE — TELEPHONE ENCOUNTER
Called and left message on voicemail with Ruiz Quintero daughter to go over shoulder x ray results, the other 2 x rays that were done are not completed yet, will await call back

## 2023-03-17 NOTE — TELEPHONE ENCOUNTER
Patient and her daughter called back and were given the xray results, they verbalized an understanding.

## 2023-03-17 NOTE — TELEPHONE ENCOUNTER
----- Message from Loyd Vazquez DO sent at 3/17/2023 10:54 AM EDT -----  Reviewed test abnormal, inform patient that it showed osteoarthritis of the shoulder. The other xrays have not been resulted by radiology yet. We will call her when we get them.

## 2023-03-18 ENCOUNTER — PATIENT MESSAGE (OUTPATIENT)
Dept: FAMILY MEDICINE CLINIC | Age: 88
End: 2023-03-18

## 2023-03-18 DIAGNOSIS — S81.801A WOUND OF RIGHT LOWER EXTREMITY, INITIAL ENCOUNTER: Primary | ICD-10-CM

## 2023-03-20 RX ORDER — CEPHALEXIN 500 MG/1
500 CAPSULE ORAL 4 TIMES DAILY
Qty: 28 CAPSULE | Refills: 0 | Status: SHIPPED | OUTPATIENT
Start: 2023-03-20 | End: 2023-03-27

## 2023-03-20 NOTE — TELEPHONE ENCOUNTER
From: Alicia Alva  To: Dr. Smith Rad: 3/18/2023 6:54 PM EDT  Subject: Wound care    Dr Marbella Valencia, I am hoping that I can attach some pictures for you of some skin scrapes. My mom has had come up on her legs. Im not sure if she is bumping into things or catching them on a drawer but I wanted to address them sooner than later, as to how to care for her older thinner skin.    Michel Schulz [phone]  Thank you

## 2023-03-23 NOTE — DISCHARGE INSTRUCTIONS
Visit Discharge/Physician Orders    Discharge condition: Stable    Assessment of pain at discharge: None    Anesthetic used: Lidocaine 4%    Discharge to: Home    Left via:Private automobile    Accompanied by: accompanied by family member    ECF/HHA:     Dressing Orders: No open wounds, Ok to apply Aquaphor to legs to help keep legs moisturized. Can obtain over the counter compression stockings (30-40mmHg pressure). Treatment Orders: FOLLOW NUTRITIOUS DIET. CHOOSE FOODS HIGH IN PROTEIN -CHICKEN- FISH-AND EGGS,  CHOOSE FOODS HIGH IN VITAMIN C.   MULTIVITAMIN DAILY. Continue antibiotics as prescribed by PCP until complete. Vascular studies to be ordered and scheduled. [address] [place] [address],[address] followup visit ____1 week Dr. Lyric Lawrence ________________________  (Please note your next appointment above and if you are unable to keep, kindly give a 24 hour notice. Thank you.)    Physician signature:__________________________      If you experience any of the following, please call the [facility] during business hours:    * Increase in Pain  * Temperature over 101  * Increase in drainage from your wound  * Drainage with a foul odor  * Bleeding  * Increase in swelling  * Need for compression bandage changes due to slippage, breakthrough drainage. If you need medical attention outside of the business hours of the [facility] please contact your PCP or go to the nearest emergency room.

## 2023-03-27 ENCOUNTER — OFFICE VISIT (OUTPATIENT)
Dept: FAMILY MEDICINE CLINIC | Age: 88
End: 2023-03-27
Payer: MEDICARE

## 2023-03-27 VITALS
DIASTOLIC BLOOD PRESSURE: 81 MMHG | WEIGHT: 123 LBS | HEART RATE: 71 BPM | HEIGHT: 65 IN | RESPIRATION RATE: 18 BRPM | SYSTOLIC BLOOD PRESSURE: 141 MMHG | OXYGEN SATURATION: 97 % | BODY MASS INDEX: 20.49 KG/M2

## 2023-03-27 DIAGNOSIS — J40 BRONCHITIS: Primary | ICD-10-CM

## 2023-03-27 PROBLEM — F11.20 OPIOID DEPENDENCE WITH CURRENT USE (HCC): Status: ACTIVE | Noted: 2023-03-27

## 2023-03-27 PROCEDURE — 1090F PRES/ABSN URINE INCON ASSESS: CPT

## 2023-03-27 PROCEDURE — G8484 FLU IMMUNIZE NO ADMIN: HCPCS

## 2023-03-27 PROCEDURE — 1036F TOBACCO NON-USER: CPT

## 2023-03-27 PROCEDURE — 99213 OFFICE O/P EST LOW 20 MIN: CPT

## 2023-03-27 PROCEDURE — G8427 DOCREV CUR MEDS BY ELIG CLIN: HCPCS

## 2023-03-27 PROCEDURE — 1123F ACP DISCUSS/DSCN MKR DOCD: CPT

## 2023-03-27 PROCEDURE — G8420 CALC BMI NORM PARAMETERS: HCPCS

## 2023-03-27 RX ORDER — GUAIFENESIN 600 MG/1
600 TABLET, EXTENDED RELEASE ORAL 2 TIMES DAILY
Qty: 28 TABLET | Refills: 0 | Status: SHIPPED | OUTPATIENT
Start: 2023-03-27

## 2023-03-27 RX ORDER — DOXYCYCLINE HYCLATE 100 MG/1
100 CAPSULE ORAL 2 TIMES DAILY
Qty: 20 CAPSULE | Refills: 0 | Status: SHIPPED | OUTPATIENT
Start: 2023-03-27 | End: 2023-04-06

## 2023-03-27 RX ORDER — BENZONATATE 200 MG/1
200 CAPSULE ORAL 3 TIMES DAILY PRN
Qty: 30 CAPSULE | Refills: 0 | Status: SHIPPED | OUTPATIENT
Start: 2023-03-27

## 2023-03-28 ENCOUNTER — HOSPITAL ENCOUNTER (OUTPATIENT)
Dept: WOUND CARE | Age: 88
Discharge: HOME | End: 2023-03-28
Payer: MEDICARE

## 2023-03-28 VITALS
BODY MASS INDEX: 20.49 KG/M2 | HEART RATE: 85 BPM | WEIGHT: 123 LBS | RESPIRATION RATE: 16 BRPM | DIASTOLIC BLOOD PRESSURE: 84 MMHG | SYSTOLIC BLOOD PRESSURE: 152 MMHG | HEIGHT: 65 IN | TEMPERATURE: 97.8 F

## 2023-03-28 DIAGNOSIS — M79.89 REDNESS AND SWELLING OF LOWER LEG: Primary | ICD-10-CM

## 2023-03-28 DIAGNOSIS — M79.89 PAIN AND SWELLING OF LOWER LEG, UNSPECIFIED LATERALITY: ICD-10-CM

## 2023-03-28 DIAGNOSIS — M79.669 PAIN AND SWELLING OF LOWER LEG, UNSPECIFIED LATERALITY: ICD-10-CM

## 2023-03-28 DIAGNOSIS — I73.9 PERIPHERAL VASCULAR DISEASE, UNSPECIFIED (HCC): ICD-10-CM

## 2023-03-28 DIAGNOSIS — J44.9 CHRONIC OBSTRUCTIVE PULMONARY DISEASE, UNSPECIFIED COPD TYPE (HCC): ICD-10-CM

## 2023-03-28 DIAGNOSIS — L97.909 ULCER OF LOWER EXTREMITY, UNSPECIFIED LATERALITY, UNSPECIFIED ULCER STAGE (HCC): ICD-10-CM

## 2023-03-28 DIAGNOSIS — L81.8 HEMOSIDERIN PIGMENTATION OF SKIN: ICD-10-CM

## 2023-03-28 DIAGNOSIS — J45.901 ASTHMATIC BRONCHITIS WITH ACUTE EXACERBATION, UNSPECIFIED ASTHMA SEVERITY, UNSPECIFIED WHETHER PERSISTENT: ICD-10-CM

## 2023-03-28 DIAGNOSIS — M79.89 RIGHT LEG SWELLING: ICD-10-CM

## 2023-03-28 DIAGNOSIS — M79.661 PAIN IN RIGHT LOWER LEG: ICD-10-CM

## 2023-03-28 DIAGNOSIS — R23.8 REDNESS AND SWELLING OF LOWER LEG: Primary | ICD-10-CM

## 2023-03-28 PROCEDURE — 99213 OFFICE O/P EST LOW 20 MIN: CPT

## 2023-03-28 PROCEDURE — 99203 OFFICE O/P NEW LOW 30 MIN: CPT | Performed by: NURSE PRACTITIONER

## 2023-03-28 RX ORDER — LIDOCAINE HYDROCHLORIDE 40 MG/ML
SOLUTION TOPICAL ONCE
Status: DISCONTINUED | OUTPATIENT
Start: 2023-03-28 | End: 2023-03-29 | Stop reason: HOSPADM

## 2023-03-28 NOTE — PLAN OF CARE
Problem: Cognitive:  Goal: Knowledge of wound care  Description: Knowledge of wound care  Outcome: Completed  Goal: Understands risk factors for wounds  Description: Understands risk factors for wounds  Outcome: Completed     Problem: Wound:  Goal: Will show signs of wound healing; wound closure and no evidence of infection  Description: Will show signs of wound healing; wound closure and no evidence of infection  Outcome: Completed

## 2023-03-28 NOTE — PROGRESS NOTES
deficit noted    Palpation of the foot does not cause pain   2/5 strength DF/PF  L LE Open wounds are not noted   Skin color is abnormal with hemosiderin staining    Edema is  noted   Sensation deficit noted   Palpation of the foot does not cause pain   3/5 strength DF/PF  R dorsalis pedis +1 L dorsalis pedis +2     Assessment:     Problem List Items Addressed This Visit       Asthmatic bronchitis with acute exacerbation    Chronic airway obstruction (HCC)    Hemosiderin pigmentation of skin    Relevant Orders    VL LOWER EXTREMITY ARTERIAL SEGMENTAL PRESSURES W PPG    Lower extremity ulceration (HCC)    Relevant Orders    VL LOWER EXTREMITY ARTERIAL SEGMENTAL PRESSURES W PPG    Pain and swelling of lower leg    Relevant Orders    VL LOWER EXTREMITY ARTERIAL SEGMENTAL PRESSURES W PPG     Other Visit Diagnoses       Redness and swelling of lower leg    -  Primary    Right leg    Relevant Orders    VL LOWER EXTREMITY ARTERIAL SEGMENTAL PRESSURES W PPG    Pain in right lower leg        Right leg swelling        Relevant Orders    VL LOWER EXTREMITY ARTERIAL SEGMENTAL PRESSURES W PPG            Pre Debridement Measurements:  Are located in the [facility]  Documentation Flow Sheet  Post Debridement Measurements:  Wound/Ulcer Descriptions are Pre Debridement except measurements:     Wound 03/28/23 Leg Right; Anterior #1 (Active)   Wound Image   03/28/23 0948   Wound Length (cm) 0 cm 03/28/23 0948   Wound Width (cm) 0 cm 03/28/23 0948   Wound Depth (cm) 0 cm 03/28/23 0948   Wound Surface Area (cm^2) 0 cm^2 03/28/23 0948   Wound Volume (cm^3) 0 cm^3 03/28/23 0948   Wound Assessment Dry;Fibrin;Pink/red 03/28/23 0948   Drainage Amount None 03/28/23 0948   Odor None 03/28/23 0948   Maricruz-wound Assessment Dry/flaky 03/28/23 0948   Number of days: 0       Wound 03/28/23 Leg Left;Medial #2 (Active)   Wound Image   03/28/23 0948   Wound Length (cm) 0 cm 03/28/23 0948   Wound Width (cm) 0 cm 03/28/23 0948   Wound Depth (cm) 0 cm

## 2023-04-18 ENCOUNTER — HOSPITAL ENCOUNTER (OUTPATIENT)
Dept: WOUND CARE | Age: 88
Discharge: HOME | End: 2023-04-18
Payer: MEDICARE

## 2023-04-18 VITALS
DIASTOLIC BLOOD PRESSURE: 91 MMHG | SYSTOLIC BLOOD PRESSURE: 142 MMHG | TEMPERATURE: 97.5 F | HEART RATE: 62 BPM | RESPIRATION RATE: 16 BRPM

## 2023-04-18 DIAGNOSIS — L97.902 ULCER OF LOWER EXTREMITY WITH FAT LAYER EXPOSED, UNSPECIFIED LATERALITY (HCC): Primary | ICD-10-CM

## 2023-04-18 PROCEDURE — 11042 DBRDMT SUBQ TIS 1ST 20SQCM/<: CPT

## 2023-04-18 RX ORDER — LIDOCAINE HYDROCHLORIDE 40 MG/ML
SOLUTION TOPICAL ONCE
Status: COMPLETED | OUTPATIENT
Start: 2023-04-18 | End: 2023-04-18

## 2023-04-18 RX ADMIN — LIDOCAINE HYDROCHLORIDE: 40 SOLUTION TOPICAL at 09:38

## 2023-04-18 NOTE — PROGRESS NOTES
Accompanied by: accompanied by family member     ECF/HHA:      Dressing Orders: Right leg ulcer: Cleanse with normal saline, apply GAYLE and cover with border gauze. Change every other day. Can apply aquaphor to NON-OPEN areas to keep moisturized. Wear over the counter compression (30-40mmHg). Apply first thing in the morning, and remove at night. Treatment Orders: FOLLOW NUTRITIOUS DIET. CHOOSE FOODS HIGH IN PROTEIN -CHICKEN- FISH-AND EGGS,  CHOOSE FOODS HIGH IN VITAMIN C.   MULTIVITAMIN DAILY. Vascular study reviewed in clinic today. [address] [place] [address],[address] followup visit ____1 week________________________  (Please note your next appointment above and if you are unable to keep, kindly give a 24 hour notice. Thank you.)     Physician signature:__________________________        If you experience any of the following, please call the [facility] during business hours:     * Increase in Pain  * Temperature over 101  * Increase in drainage from your wound  * Drainage with a foul odor  * Bleeding  * Increase in swelling  * Need for compression bandage changes due to slippage, breakthrough drainage. If you need medical attention outside of the business hours of the [facility] please contact your PCP or go to the nearest emergency room.          Electronically signed by Lorie Holstein, APRN - CNP on 4/18/2023 at 10:51 AM

## 2023-04-18 NOTE — PLAN OF CARE
Problem: Cognitive:  Goal: Knowledge of wound care  Description: Knowledge of wound care  Outcome: Not Progressing  Goal: Understands risk factors for wounds  Description: Understands risk factors for wounds  Outcome: Not Progressing     Problem: Wound:  Goal: Will show signs of wound healing; wound closure and no evidence of infection  Description: Will show signs of wound healing; wound closure and no evidence of infection  Outcome: Not Progressing

## 2023-04-18 NOTE — DISCHARGE INSTRUCTIONS
Visit Discharge/Physician Orders     Discharge condition: Stable     Assessment of pain at discharge: None     Anesthetic used: Lidocaine 4%     Discharge to: Home     Left via:Private automobile     Accompanied by: accompanied by family member     ECF/HHA:      Dressing Orders: Right leg ulcer: Cleanse with normal saline, apply GAYLE and cover with border gauze. Change every other day. Can apply aquaphor to NON-OPEN areas to keep moisturized. Wear over the counter compression (30-40mmHg). Apply first thing in the morning, and remove at night. Treatment Orders: FOLLOW NUTRITIOUS DIET. CHOOSE FOODS HIGH IN PROTEIN -CHICKEN- FISH-AND EGGS,  CHOOSE FOODS HIGH IN VITAMIN C.   MULTIVITAMIN DAILY. Vascular study reviewed in clinic today. [address] [place] [address],[address] followup visit ____1 week________________________  (Please note your next appointment above and if you are unable to keep, kindly give a 24 hour notice. Thank you.)     Physician signature:__________________________        If you experience any of the following, please call the [facility] during business hours:     * Increase in Pain  * Temperature over 101  * Increase in drainage from your wound  * Drainage with a foul odor  * Bleeding  * Increase in swelling  * Need for compression bandage changes due to slippage, breakthrough drainage. If you need medical attention outside of the business hours of the [facility] please contact your PCP or go to the nearest emergency room.

## 2023-05-02 ENCOUNTER — HOSPITAL ENCOUNTER (OUTPATIENT)
Dept: WOUND CARE | Age: 88
Discharge: HOME | End: 2023-05-02

## 2023-06-06 ENCOUNTER — APPOINTMENT (OUTPATIENT)
Dept: CT IMAGING | Age: 88
End: 2023-06-06
Payer: MEDICARE

## 2023-06-06 ENCOUNTER — APPOINTMENT (OUTPATIENT)
Dept: GENERAL RADIOLOGY | Age: 88
End: 2023-06-06
Payer: MEDICARE

## 2023-06-06 ENCOUNTER — HOSPITAL ENCOUNTER (INPATIENT)
Age: 88
LOS: 1 days | Discharge: HOME | End: 2023-06-06
Attending: EMERGENCY MEDICINE | Admitting: INTERNAL MEDICINE
Payer: MEDICARE

## 2023-06-06 VITALS
SYSTOLIC BLOOD PRESSURE: 155 MMHG | RESPIRATION RATE: 14 BRPM | HEART RATE: 57 BPM | DIASTOLIC BLOOD PRESSURE: 83 MMHG | TEMPERATURE: 98 F | OXYGEN SATURATION: 94 %

## 2023-06-06 DIAGNOSIS — G45.9 TIA (TRANSIENT ISCHEMIC ATTACK): Primary | ICD-10-CM

## 2023-06-06 LAB
ALBUMIN SERPL-MCNC: 4 G/DL (ref 3.5–5.2)
ALP SERPL-CCNC: 76 U/L (ref 35–104)
ALT SERPL-CCNC: 14 U/L (ref 0–32)
ANION GAP SERPL CALCULATED.3IONS-SCNC: 10 MMOL/L (ref 7–16)
AST SERPL-CCNC: 16 U/L (ref 0–31)
BACTERIA URNS QL MICRO: ABNORMAL /HPF
BASOPHILS # BLD: 0.02 E9/L (ref 0–0.2)
BASOPHILS NFR BLD: 0.3 % (ref 0–2)
BILIRUB SERPL-MCNC: 0.4 MG/DL (ref 0–1.2)
BILIRUB UR QL STRIP: NEGATIVE
BUN SERPL-MCNC: 10 MG/DL (ref 6–23)
CALCIUM SERPL-MCNC: 9.2 MG/DL (ref 8.6–10.2)
CHLORIDE SERPL-SCNC: 102 MMOL/L (ref 98–107)
CHP ED QC CHECK: 114
CK SERPL-CCNC: 35 U/L (ref 20–180)
CLARITY UR: CLEAR
CO2 SERPL-SCNC: 25 MMOL/L (ref 22–29)
COLOR UR: YELLOW
CREAT SERPL-MCNC: 0.5 MG/DL (ref 0.5–1)
EKG ATRIAL RATE: 51 BPM
EKG P AXIS: 70 DEGREES
EKG P-R INTERVAL: 140 MS
EKG Q-T INTERVAL: 454 MS
EKG QRS DURATION: 102 MS
EKG QTC CALCULATION (BAZETT): 418 MS
EKG R AXIS: 88 DEGREES
EKG T AXIS: 39 DEGREES
EKG VENTRICULAR RATE: 51 BPM
EOSINOPHIL # BLD: 0.12 E9/L (ref 0.05–0.5)
EOSINOPHIL NFR BLD: 2 % (ref 0–6)
ERYTHROCYTE [DISTWIDTH] IN BLOOD BY AUTOMATED COUNT: 13.2 FL (ref 11.5–15)
GLUCOSE SERPL-MCNC: 102 MG/DL (ref 74–99)
GLUCOSE UR STRIP-MCNC: NEGATIVE MG/DL
HCT VFR BLD AUTO: 43.9 % (ref 34–48)
HGB BLD-MCNC: 14.1 G/DL (ref 11.5–15.5)
HGB UR QL STRIP: NEGATIVE
IMM GRANULOCYTES # BLD: 0.03 E9/L
IMM GRANULOCYTES NFR BLD: 0.5 % (ref 0–5)
INR BLD: 1
KETONES UR STRIP-MCNC: NEGATIVE MG/DL
LACTATE BLDV-SCNC: 1.3 MMOL/L (ref 0.5–2.2)
LEUKOCYTE ESTERASE UR QL STRIP: NEGATIVE
LYMPHOCYTES # BLD: 1.83 E9/L (ref 1.5–4)
LYMPHOCYTES NFR BLD: 30.2 % (ref 20–42)
MCH RBC QN AUTO: 29.7 PG (ref 26–35)
MCHC RBC AUTO-ENTMCNC: 32.1 % (ref 32–34.5)
MCV RBC AUTO: 92.6 FL (ref 80–99.9)
METER GLUCOSE: 114 MG/DL (ref 74–99)
MONOCYTES # BLD: 0.38 E9/L (ref 0.1–0.95)
MONOCYTES NFR BLD: 6.3 % (ref 2–12)
NEUTROPHILS # BLD: 3.68 E9/L (ref 1.8–7.3)
NEUTS SEG NFR BLD: 60.7 % (ref 43–80)
NITRITE UR QL STRIP: NEGATIVE
PH UR STRIP: 7 [PH] (ref 5–9)
PLATELET # BLD AUTO: 192 E9/L (ref 130–450)
PMV BLD AUTO: 11.5 FL (ref 7–12)
POTASSIUM SERPL-SCNC: 3.8 MMOL/L (ref 3.5–5)
PROT SERPL-MCNC: 6.1 G/DL (ref 6.4–8.3)
PROT UR STRIP-MCNC: NEGATIVE MG/DL
PROTHROMBIN TIME: 11.9 SEC (ref 9.3–12.4)
RBC # BLD AUTO: 4.74 E12/L (ref 3.5–5.5)
RBC #/AREA URNS HPF: ABNORMAL /HPF (ref 0–2)
REASON FOR REJECTION: NORMAL
REJECTED TEST: NORMAL
SARS-COV-2 RDRP RESP QL NAA+PROBE: NOT DETECTED
SODIUM SERPL-SCNC: 137 MMOL/L (ref 132–146)
SP GR UR STRIP: <=1.005 (ref 1–1.03)
TROPONIN, HIGH SENSITIVITY: 12 NG/L (ref 0–9)
UROBILINOGEN UR STRIP-ACNC: 0.2 E.U./DL
WBC # BLD: 6.1 E9/L (ref 4.5–11.5)
WBC #/AREA URNS HPF: ABNORMAL /HPF (ref 0–5)

## 2023-06-06 PROCEDURE — 1200000000 HC SEMI PRIVATE

## 2023-06-06 PROCEDURE — 93010 ELECTROCARDIOGRAM REPORT: CPT | Performed by: INTERNAL MEDICINE

## 2023-06-06 PROCEDURE — 93005 ELECTROCARDIOGRAM TRACING: CPT

## 2023-06-06 PROCEDURE — 81001 URINALYSIS AUTO W/SCOPE: CPT

## 2023-06-06 PROCEDURE — 87635 SARS-COV-2 COVID-19 AMP PRB: CPT

## 2023-06-06 PROCEDURE — 80053 COMPREHEN METABOLIC PANEL: CPT

## 2023-06-06 PROCEDURE — 72192 CT PELVIS W/O DYE: CPT

## 2023-06-06 PROCEDURE — 87040 BLOOD CULTURE FOR BACTERIA: CPT

## 2023-06-06 PROCEDURE — 99285 EMERGENCY DEPT VISIT HI MDM: CPT

## 2023-06-06 PROCEDURE — 84484 ASSAY OF TROPONIN QUANT: CPT

## 2023-06-06 PROCEDURE — 36415 COLL VENOUS BLD VENIPUNCTURE: CPT

## 2023-06-06 PROCEDURE — 71045 X-RAY EXAM CHEST 1 VIEW: CPT

## 2023-06-06 PROCEDURE — 82550 ASSAY OF CK (CPK): CPT

## 2023-06-06 PROCEDURE — 85025 COMPLETE CBC W/AUTO DIFF WBC: CPT

## 2023-06-06 PROCEDURE — 85610 PROTHROMBIN TIME: CPT

## 2023-06-06 PROCEDURE — 70450 CT HEAD/BRAIN W/O DYE: CPT

## 2023-06-06 PROCEDURE — 83605 ASSAY OF LACTIC ACID: CPT

## 2023-06-06 PROCEDURE — 82962 GLUCOSE BLOOD TEST: CPT

## 2023-06-06 RX ORDER — POTASSIUM CHLORIDE 7.45 MG/ML
10 INJECTION INTRAVENOUS PRN
Status: CANCELLED | OUTPATIENT
Start: 2023-06-06

## 2023-06-06 RX ORDER — DONEPEZIL HYDROCHLORIDE 10 MG/1
10 TABLET, FILM COATED ORAL NIGHTLY
Status: CANCELLED | OUTPATIENT
Start: 2023-06-06

## 2023-06-06 RX ORDER — ASPIRIN 81 MG/1
81 TABLET ORAL DAILY
Status: CANCELLED | OUTPATIENT
Start: 2023-06-06

## 2023-06-06 RX ORDER — BUDESONIDE 0.5 MG/2ML
0.5 INHALANT ORAL 2 TIMES DAILY
Status: CANCELLED | OUTPATIENT
Start: 2023-06-06

## 2023-06-06 RX ORDER — LABETALOL HYDROCHLORIDE 5 MG/ML
10 INJECTION, SOLUTION INTRAVENOUS EVERY 10 MIN PRN
Status: CANCELLED | OUTPATIENT
Start: 2023-06-06

## 2023-06-06 RX ORDER — MAGNESIUM SULFATE 1 G/100ML
1000 INJECTION INTRAVENOUS PRN
Status: CANCELLED | OUTPATIENT
Start: 2023-06-06

## 2023-06-06 RX ORDER — POLYETHYLENE GLYCOL 3350 17 G/17G
17 POWDER, FOR SOLUTION ORAL DAILY PRN
Status: CANCELLED | OUTPATIENT
Start: 2023-06-06

## 2023-06-06 RX ORDER — ALBUTEROL SULFATE 2.5 MG/3ML
2.5 SOLUTION RESPIRATORY (INHALATION) EVERY 4 HOURS PRN
Status: CANCELLED | OUTPATIENT
Start: 2023-06-06

## 2023-06-06 RX ORDER — ARFORMOTEROL TARTRATE 15 UG/2ML
15 SOLUTION RESPIRATORY (INHALATION) 2 TIMES DAILY
Status: CANCELLED | OUTPATIENT
Start: 2023-06-06

## 2023-06-06 RX ORDER — POTASSIUM CHLORIDE 20 MEQ/1
40 TABLET, EXTENDED RELEASE ORAL PRN
Status: CANCELLED | OUTPATIENT
Start: 2023-06-06

## 2023-06-06 RX ORDER — ASPIRIN 300 MG/1
300 SUPPOSITORY RECTAL DAILY
Status: CANCELLED | OUTPATIENT
Start: 2023-06-06

## 2023-06-06 RX ORDER — MIRTAZAPINE 15 MG/1
15 TABLET, FILM COATED ORAL NIGHTLY
Status: CANCELLED | OUTPATIENT
Start: 2023-06-06

## 2023-06-06 RX ORDER — ONDANSETRON 2 MG/ML
4 INJECTION INTRAMUSCULAR; INTRAVENOUS EVERY 6 HOURS PRN
Status: CANCELLED | OUTPATIENT
Start: 2023-06-06

## 2023-06-06 RX ORDER — ONDANSETRON 4 MG/1
4 TABLET, ORALLY DISINTEGRATING ORAL EVERY 8 HOURS PRN
Status: CANCELLED | OUTPATIENT
Start: 2023-06-06

## 2023-06-06 RX ORDER — ENOXAPARIN SODIUM 100 MG/ML
40 INJECTION SUBCUTANEOUS DAILY
Status: CANCELLED | OUTPATIENT
Start: 2023-06-06

## 2023-06-06 RX ORDER — MEMANTINE HYDROCHLORIDE 10 MG/1
10 TABLET ORAL 2 TIMES DAILY
Status: CANCELLED | OUTPATIENT
Start: 2023-06-06

## 2023-06-06 ASSESSMENT — PAIN - FUNCTIONAL ASSESSMENT: PAIN_FUNCTIONAL_ASSESSMENT: 0-10

## 2023-06-06 ASSESSMENT — PAIN DESCRIPTION - LOCATION: LOCATION: HEAD;BACK

## 2023-06-06 NOTE — H&P
Department of Internal Medicine  History and Physical Examination     Primary Care Physician: Rain Nixon. Mala Andrews MD   Admitting Physician:  No admitting provider for patient encounter. Admission date and time: 6/6/2023 10:42 AM    Room:  01/01  Admitting diagnosis: No admission diagnoses are documented for this encounter. Patient Name: Beth Hinojosa  MRN: [id number]    Date of Service: 6/6/2023     Chief Complaint:  Dysarthria    HISTORY OF PRESENT ILLNESS:    Beth Hinojosa is-year-old female who presented to [address] [place] [address] with an episode of dysarthria or slurred speech. Last known well was uncertain. He was apparently at her baseline when she was in bed but woke up this morning at unknown time. She spoke to her daughter on telephone around 9 or 930 this morning and had slurred speech. ER work-up is in process. Her symptoms have already resolved and normalized. Noncontrast head CT has been interpreted as negative. Chest x-ray is unremarkable. EKG reveals bradycardia was otherwise unremarkable. Metabolic panel, CBC, lactic acid and point care glucose are all normal.  Patient vital signs show hypertension with blood pressure 164/88 mmHg otherwise unremarkable. She has been discussed with ER physician with plan for admission, further care admitted under the service of Dr. Meghan Trevino. Patient is seen and examined at bedside today. She is somewhat of a poor historian with underlying dementia. She confirms the above given history. No headache, no injury or trauma. She describes a minor fall, but is actually unsure whether this occurred recently or not. She does have leg cramping but no significant injury or limitations in range of motion. No fevers or chills. No known sick contact or exposures. No additional neurologic complaints aside from that of generalized weakness as well as longstanding memory issues with underlying dementia. No chest pain or palpitations, fluttering.   No shortness of breath, cough or

## 2023-06-06 NOTE — ED PROVIDER NOTES
Hyperlipidemia, and Hypertension. EMERGENCY DEPARTMENT COURSE    Vitals:    Vitals:    06/06/23 1056 06/06/23 1200   BP: (!) 164/88 (!) 155/83   Pulse: 60 57   Resp: 14 14   Temp: 98 °F (36.7 °C)    SpO2: 94% 94%       Patient was given the following medications:  Medications - No data to display        Is this patient to be included in the SEP-1 Core Measure due to severe sepsis or septic shock? No   Exclusion criteria - the patient is NOT to be included for SEP-1 Core Measure due to: Infection is not suspected        Medical Decision Making/Differential Diagnosis:    CC/HPI Summary, Social Determinants of health, Records Reviewed, DDx, testing done/not done, ED Course, Reassessment, disposition considerations/shared decision making with patient, consults, disposition:      ED Course as of 06/07/23 0927 Tue Jun 06, 2023 1128   ATTENDING PROVIDER ATTESTATION:     I have personally performed and/or participated in the history, exam, medical decision making, and procedures and agree with all pertinent clinical information unless otherwise noted. I have also reviewed and agree with the past medical, family and social history unless otherwise noted. I have discussed this patient in detail with the resident and provided the instruction and education regarding the evidence-based evaluation and treatment of altered mentation. Any EKG that may have been performed has been personally reviewed by me and I agree with the documentation as noted by the resident. History: Patient brought into the ED for evaluation. The daughter states that this morning around 930 they noticed that she was not acting appropriate. She was having some slurred speech and also had a little bit of drift of her right upper extremity. Those symptoms have since resolved. Last time she was actually known to be well was last night. She denies any chest pain. She reports mild dyspnea. No associated nausea, vomiting, or diarrhea.

## 2023-06-06 NOTE — DISCHARGE INSTRUCTIONS
Please continue taking your medications as they are prescribed. We recommend that you follow-up with your primary care physician as well as Dr. Rhona Bills or your preferred neurologist to follow-up on your symptoms and to discuss your recent emergency room visit. Please return to the emergency room if you have resurgence of your symptoms, severe headache, vision changes, numbness, weakness, tingling, loss of sensation, slurred speech, facial droop, severe chest pain, severe shortness of breath, or uncontrolled fevers.

## 2023-06-10 SDOH — ECONOMIC STABILITY: FOOD INSECURITY: WITHIN THE PAST 12 MONTHS, THE FOOD YOU BOUGHT JUST DIDN'T LAST AND YOU DIDN'T HAVE MONEY TO GET MORE.: NEVER TRUE

## 2023-06-10 SDOH — ECONOMIC STABILITY: INCOME INSECURITY: HOW HARD IS IT FOR YOU TO PAY FOR THE VERY BASICS LIKE FOOD, HOUSING, MEDICAL CARE, AND HEATING?: NOT HARD AT ALL

## 2023-06-10 SDOH — ECONOMIC STABILITY: FOOD INSECURITY: WITHIN THE PAST 12 MONTHS, YOU WORRIED THAT YOUR FOOD WOULD RUN OUT BEFORE YOU GOT MONEY TO BUY MORE.: NEVER TRUE

## 2023-06-11 LAB
BACTERIA BLD CULT ORG #2: NORMAL
BACTERIA BLD CULT: NORMAL

## 2023-06-13 ENCOUNTER — OFFICE VISIT (OUTPATIENT)
Dept: GERIATRIC MEDICINE | Age: 88
End: 2023-06-13
Payer: MEDICARE

## 2023-06-13 VITALS
TEMPERATURE: 97.9 F | RESPIRATION RATE: 16 BRPM | HEART RATE: 52 BPM | WEIGHT: 123.2 LBS | BODY MASS INDEX: 20.5 KG/M2 | DIASTOLIC BLOOD PRESSURE: 70 MMHG | OXYGEN SATURATION: 94 % | SYSTOLIC BLOOD PRESSURE: 124 MMHG

## 2023-06-13 DIAGNOSIS — M35.3 PMR (POLYMYALGIA RHEUMATICA) (HCC): ICD-10-CM

## 2023-06-13 DIAGNOSIS — M35.3 PMR (POLYMYALGIA RHEUMATICA) (HCC): Primary | ICD-10-CM

## 2023-06-13 DIAGNOSIS — F32.1 MODERATE SINGLE CURRENT EPISODE OF MAJOR DEPRESSIVE DISORDER (HCC): ICD-10-CM

## 2023-06-13 DIAGNOSIS — G30.9 ALZHEIMER DISEASE (HCC): ICD-10-CM

## 2023-06-13 DIAGNOSIS — I47.1 SVT (SUPRAVENTRICULAR TACHYCARDIA) (HCC): ICD-10-CM

## 2023-06-13 DIAGNOSIS — F02.80 ALZHEIMER DISEASE (HCC): ICD-10-CM

## 2023-06-13 DIAGNOSIS — F11.20 OPIOID DEPENDENCE WITH CURRENT USE (HCC): ICD-10-CM

## 2023-06-13 LAB — CRP SERPL HS-MCNC: <0.3 MG/DL (ref 0–0.4)

## 2023-06-13 PROCEDURE — 99212 OFFICE O/P EST SF 10 MIN: CPT | Performed by: INTERNAL MEDICINE

## 2023-06-13 PROCEDURE — 1090F PRES/ABSN URINE INCON ASSESS: CPT | Performed by: INTERNAL MEDICINE

## 2023-06-13 PROCEDURE — 36415 COLL VENOUS BLD VENIPUNCTURE: CPT | Performed by: INTERNAL MEDICINE

## 2023-06-13 PROCEDURE — G8427 DOCREV CUR MEDS BY ELIG CLIN: HCPCS | Performed by: INTERNAL MEDICINE

## 2023-06-13 PROCEDURE — 1123F ACP DISCUSS/DSCN MKR DOCD: CPT | Performed by: INTERNAL MEDICINE

## 2023-06-13 PROCEDURE — 1036F TOBACCO NON-USER: CPT | Performed by: INTERNAL MEDICINE

## 2023-06-13 PROCEDURE — G8420 CALC BMI NORM PARAMETERS: HCPCS | Performed by: INTERNAL MEDICINE

## 2023-06-13 RX ORDER — ASCORBIC ACID 500 MG
500 TABLET ORAL 2 TIMES DAILY
COMMUNITY

## 2023-06-13 RX ORDER — MELOXICAM 7.5 MG/1
7.5 TABLET ORAL EVERY MORNING
COMMUNITY

## 2023-06-14 LAB — ERYTHROCYTE [SEDIMENTATION RATE] IN BLOOD BY WESTERGREN METHOD: 8 MM/HR (ref 0–20)

## 2023-06-22 ENCOUNTER — TELEPHONE (OUTPATIENT)
Dept: PALLATIVE CARE | Age: 88
End: 2023-06-22

## 2023-06-22 NOTE — TELEPHONE ENCOUNTER
Called and spoke to Harriet Mccoy' daughter, regarding referral for Palliative Care. Explained Palliative service and location of clinic. Fredo set 7/26/23.

## 2023-07-17 ENCOUNTER — OFFICE VISIT (OUTPATIENT)
Dept: FAMILY MEDICINE CLINIC | Age: 88
End: 2023-07-17
Payer: MEDICARE

## 2023-07-17 VITALS
RESPIRATION RATE: 16 BRPM | SYSTOLIC BLOOD PRESSURE: 137 MMHG | DIASTOLIC BLOOD PRESSURE: 74 MMHG | HEIGHT: 65 IN | OXYGEN SATURATION: 95 % | HEART RATE: 53 BPM | TEMPERATURE: 97.3 F | WEIGHT: 126 LBS | BODY MASS INDEX: 20.99 KG/M2

## 2023-07-17 DIAGNOSIS — F02.B0 MODERATE LATE ONSET ALZHEIMER'S DEMENTIA WITHOUT BEHAVIORAL DISTURBANCE, PSYCHOTIC DISTURBANCE, MOOD DISTURBANCE, OR ANXIETY (HCC): ICD-10-CM

## 2023-07-17 DIAGNOSIS — G30.1 MODERATE LATE ONSET ALZHEIMER'S DEMENTIA WITHOUT BEHAVIORAL DISTURBANCE, PSYCHOTIC DISTURBANCE, MOOD DISTURBANCE, OR ANXIETY (HCC): ICD-10-CM

## 2023-07-17 DIAGNOSIS — J41.0 SIMPLE CHRONIC BRONCHITIS (HCC): Primary | ICD-10-CM

## 2023-07-17 DIAGNOSIS — F32.1 MODERATE SINGLE CURRENT EPISODE OF MAJOR DEPRESSIVE DISORDER (HCC): ICD-10-CM

## 2023-07-17 DIAGNOSIS — M81.0 AGE-RELATED OSTEOPOROSIS WITHOUT CURRENT PATHOLOGICAL FRACTURE: ICD-10-CM

## 2023-07-17 PROCEDURE — 1090F PRES/ABSN URINE INCON ASSESS: CPT | Performed by: FAMILY MEDICINE

## 2023-07-17 PROCEDURE — G8420 CALC BMI NORM PARAMETERS: HCPCS | Performed by: FAMILY MEDICINE

## 2023-07-17 PROCEDURE — 99213 OFFICE O/P EST LOW 20 MIN: CPT | Performed by: FAMILY MEDICINE

## 2023-07-17 PROCEDURE — 1123F ACP DISCUSS/DSCN MKR DOCD: CPT | Performed by: FAMILY MEDICINE

## 2023-07-17 PROCEDURE — 3023F SPIROM DOC REV: CPT | Performed by: FAMILY MEDICINE

## 2023-07-17 PROCEDURE — G8427 DOCREV CUR MEDS BY ELIG CLIN: HCPCS | Performed by: FAMILY MEDICINE

## 2023-07-17 PROCEDURE — 1036F TOBACCO NON-USER: CPT | Performed by: FAMILY MEDICINE

## 2023-07-17 RX ORDER — ALBUTEROL SULFATE 2.5 MG/3ML
2.5 SOLUTION RESPIRATORY (INHALATION) 4 TIMES DAILY PRN
Qty: 120 EACH | Refills: 3 | Status: SHIPPED | OUTPATIENT
Start: 2023-07-17

## 2023-07-17 RX ORDER — DONEPEZIL HYDROCHLORIDE 10 MG/1
10 TABLET, FILM COATED ORAL NIGHTLY
Qty: 90 TABLET | Refills: 3 | Status: SHIPPED | OUTPATIENT
Start: 2023-07-17

## 2023-07-17 RX ORDER — OMEPRAZOLE 20 MG/1
20 CAPSULE, DELAYED RELEASE ORAL DAILY
Qty: 90 CAPSULE | Refills: 3 | Status: SHIPPED | OUTPATIENT
Start: 2023-07-17

## 2023-07-17 RX ORDER — MELOXICAM 15 MG/1
15 TABLET ORAL EVERY MORNING
Qty: 90 TABLET | Refills: 1 | Status: SHIPPED | OUTPATIENT
Start: 2023-07-17

## 2023-07-17 RX ORDER — MIRTAZAPINE 15 MG/1
15 TABLET, FILM COATED ORAL NIGHTLY
Qty: 90 TABLET | Refills: 3 | Status: SHIPPED | OUTPATIENT
Start: 2023-07-17

## 2023-07-17 RX ORDER — ALENDRONATE SODIUM 70 MG/1
70 TABLET ORAL
Qty: 12 TABLET | Refills: 3 | Status: SHIPPED | OUTPATIENT
Start: 2023-07-17

## 2023-07-17 RX ORDER — MEMANTINE HYDROCHLORIDE 10 MG/1
10 TABLET ORAL 2 TIMES DAILY
Qty: 180 TABLET | Refills: 1 | Status: SHIPPED | OUTPATIENT
Start: 2023-07-17

## 2023-07-19 ENCOUNTER — TELEPHONE (OUTPATIENT)
Dept: FAMILY MEDICINE CLINIC | Age: 88
End: 2023-07-19

## 2023-07-19 DIAGNOSIS — J44.9 CHRONIC OBSTRUCTIVE PULMONARY DISEASE, UNSPECIFIED COPD TYPE (HCC): Primary | ICD-10-CM

## 2023-07-20 NOTE — TELEPHONE ENCOUNTER
Copd , code J44.9
Pharmacy called needing a dx code for pt albuterol solution in order for medicare to pay for it
Spoke with pharmacy gave dx code.
,DirectAddress_Unknown

## 2023-07-24 ENCOUNTER — OFFICE VISIT (OUTPATIENT)
Dept: ENT CLINIC | Age: 88
End: 2023-07-24
Payer: MEDICARE

## 2023-07-24 VITALS
BODY MASS INDEX: 21.16 KG/M2 | DIASTOLIC BLOOD PRESSURE: 76 MMHG | HEART RATE: 65 BPM | SYSTOLIC BLOOD PRESSURE: 152 MMHG | HEIGHT: 65 IN | WEIGHT: 127 LBS

## 2023-07-24 DIAGNOSIS — H61.23 BILATERAL IMPACTED CERUMEN: Primary | ICD-10-CM

## 2023-07-24 PROCEDURE — 99212 OFFICE O/P EST SF 10 MIN: CPT | Performed by: NURSE PRACTITIONER

## 2023-07-24 PROCEDURE — 69210 REMOVE IMPACTED EAR WAX UNI: CPT | Performed by: NURSE PRACTITIONER

## 2023-07-24 NOTE — PROGRESS NOTES
Subjective:      Patient ID:  Chrissie Joel is a 80 y.o. female. HPI:    Pt presents with a history of cerumen impaction removal.   The patients ear was last cleaned 6 month(s) ago. The patient was not using ear drops to loosen wax immediately prior to this visit. Hearing aids: yes      Past Medical History:   Diagnosis Date    Asthma     COPD (chronic obstructive pulmonary disease) ([address])     Depression     Hyperlipidemia     Hypertension      Past Surgical History:   Procedure Laterality Date    CATARACT REMOVAL WITH IMPLANT      right eye    EYE SURGERY      HYSTERECTOMY (CERVIX STATUS UNKNOWN)       History reviewed. No pertinent family history. Social History     Socioeconomic History    Marital status:      Spouse name: None    Number of children: 2    Years of education: None    Highest education level: 11th grade   Tobacco Use    Smoking status: Never    Smokeless tobacco: Never   Vaping Use    Vaping Use: Never used   Substance and Sexual Activity    Alcohol use: No     Comment: 1 cup of coffee a day     Drug use: No    Sexual activity: Not Currently     Partners: Male     Social Determinants of Health     Financial Resource Strain: Low Risk     Difficulty of Paying Living Expenses: Not hard at all   Food Insecurity: No Food Insecurity    Worried About Running Out of Food in the Last Year: Never true    Ran Out of Food in the Last Year: Never true   Transportation Needs: No Transportation Needs    Lack of Transportation (Medical): No    Lack of Transportation (Non-Medical): No   Physical Activity: Sufficiently Active    Days of Exercise per Week: 5 days    Minutes of Exercise per Session: 50 min   Housing Stability: Unknown    Unable to Pay for Housing in the Last Year: No    Unstable Housing in the Last Year: No     Allergies   Allergen Reactions    Demerol Hcl [Meperidine]      Other reaction(s):  Intolerance  \"went into shok\"    Statins Other (See Comments)       Review of Systems   HENT:

## 2023-07-25 ASSESSMENT — ENCOUNTER SYMPTOMS
SHORTNESS OF BREATH: 1
WHEEZING: 0
ABDOMINAL PAIN: 0
COUGH: 0

## 2023-07-26 ENCOUNTER — OFFICE VISIT (OUTPATIENT)
Dept: PALLATIVE CARE | Age: 88
End: 2023-07-26
Payer: MEDICARE

## 2023-07-26 ENCOUNTER — TELEPHONE (OUTPATIENT)
Dept: PALLATIVE CARE | Age: 88
End: 2023-07-26

## 2023-07-26 VITALS
BODY MASS INDEX: 20.97 KG/M2 | TEMPERATURE: 97.3 F | WEIGHT: 126 LBS | SYSTOLIC BLOOD PRESSURE: 135 MMHG | DIASTOLIC BLOOD PRESSURE: 91 MMHG | HEART RATE: 64 BPM | OXYGEN SATURATION: 95 %

## 2023-07-26 DIAGNOSIS — R54 AGE-RELATED PHYSICAL DEBILITY: Primary | ICD-10-CM

## 2023-07-26 PROCEDURE — G8420 CALC BMI NORM PARAMETERS: HCPCS | Performed by: NURSE PRACTITIONER

## 2023-07-26 PROCEDURE — 99203 OFFICE O/P NEW LOW 30 MIN: CPT | Performed by: NURSE PRACTITIONER

## 2023-07-26 PROCEDURE — 1123F ACP DISCUSS/DSCN MKR DOCD: CPT | Performed by: NURSE PRACTITIONER

## 2023-07-26 PROCEDURE — 99205 OFFICE O/P NEW HI 60 MIN: CPT | Performed by: NURSE PRACTITIONER

## 2023-07-26 PROCEDURE — G8427 DOCREV CUR MEDS BY ELIG CLIN: HCPCS | Performed by: NURSE PRACTITIONER

## 2023-07-26 PROCEDURE — 1090F PRES/ABSN URINE INCON ASSESS: CPT | Performed by: NURSE PRACTITIONER

## 2023-07-26 PROCEDURE — 1036F TOBACCO NON-USER: CPT | Performed by: NURSE PRACTITIONER

## 2023-07-26 NOTE — TELEPHONE ENCOUNTER
Faxed referral for [facility] ( fax # [phone]) in [place] per provider and patient request.  Faxed order, recent office visit note and Demographic sheet.

## 2023-07-26 NOTE — PROGRESS NOTES
Palliative Care Department  Provider: Shelton Triana APRN - CNP    Referring Provider:  Dr. Audrey Lehman    Location of Service:   [facility]    Chief Complaint: Carlotta Rico is a 80 y.o. female with chief complaint of fatigue, pain    Assessment/Plan      Senile Demetia of the Alzheimer Type:   -  Known to Dr. Violetta Shearer   -  On Namenda and Aricept    Age related Physical Debility and fatigue:   -  Encouraged activity as tolerated   -  ? Attending Adult day care?   -  ?[facility] [address] for PT/OT   -  ? Addition of low dose neuro stimulant such as Methylphenidate    Depression/Decreased Appetite:   -  Mirtazapine 15 mg HS    Chronica Pain syndrome/OA vs PMR:   -  Tylenol   -  Meloxicam 15 mg daily   -  Recommend PT    Follow Up:  3 months. They were encouraged to call with any questions, concerns, needs, or changes in symptoms. Subjective:     HPI:  Carlotta Rico is a 80 y.o. female with significant medical history of senile dementia, with some report of sundowning, as well as some overall decline in physical abilities, with worse pain and achiness throughout the day, who was referred to [facility] for support. Subjective/Events/Discussions: Juliana Brantley presents today accompanied by her daughter. She is alert, fairly oriented, but is noted to have some memory impairment, which overall is mild. She is not a great historian, however the daughter is very good historian. Her primary complaint is that of fatigue and weakness, as well as pain during the daytime, worse in the mornings. For the most part she states that she is independent for ADLs, with her great grandson who lives in the home with her, as well as her daughter who checks on her daily. Emilee overall feels that she is performing very well for her age, and she personally reports she does not think she has any needs at this time which need to be addressed.   She does admit to pain, however she feels it is well managed with her

## 2023-09-08 ENCOUNTER — TELEPHONE (OUTPATIENT)
Dept: FAMILY MEDICINE CLINIC | Age: 88
End: 2023-09-08

## 2023-09-08 RX ORDER — METOPROLOL SUCCINATE 25 MG/1
12.5 TABLET, EXTENDED RELEASE ORAL DAILY
Qty: 45 TABLET | Refills: 1 | Status: SHIPPED | OUTPATIENT
Start: 2023-09-08

## 2023-09-08 NOTE — TELEPHONE ENCOUNTER
Has been having fatigue and breathing heavier. Wednesday night got up to go the bathroom and was taking a long time to walk . Was breathing heavy. Her pulse ox and hr was 153 and oxygen was in the 90s. Blood pressure 150/130. Monitored her and was moving all limbs appropriately. Gave her a baby aspirin. This lasted for an hour. Got her to hold her breath that heart rate went down to 77. Has happened three times since Wednesday. Will restart magnesium supplement and restart metoprolol succinate 12.5mg daily     Will call if no improvement.

## 2023-09-08 NOTE — TELEPHONE ENCOUNTER
Pt daughter called states that mom has had some heart racing states that she has appt in Oct looked for appt , but she did not want a n appt wants to talk to you

## 2023-09-18 ENCOUNTER — OFFICE VISIT (OUTPATIENT)
Dept: PHYSICAL MEDICINE AND REHAB | Age: 88
End: 2023-09-18

## 2023-09-18 VITALS
BODY MASS INDEX: 21.33 KG/M2 | HEIGHT: 65 IN | DIASTOLIC BLOOD PRESSURE: 80 MMHG | SYSTOLIC BLOOD PRESSURE: 140 MMHG | HEART RATE: 76 BPM | WEIGHT: 128 LBS

## 2023-09-18 DIAGNOSIS — M79.609 PAIN IN EXTREMITY, UNSPECIFIED EXTREMITY: Primary | ICD-10-CM

## 2023-09-18 RX ORDER — ERYTHROMYCIN 5 MG/G
OINTMENT OPHTHALMIC
COMMUNITY
Start: 2023-07-27

## 2023-09-18 NOTE — PROGRESS NOTES
[facility]  [address] [facility]. [address], [address] [place] [address]  Phone: [phone]  Fax: [phone]        9/22/23    Chief Complaint   Patient presents with    Hip Pain     6 month f/u hip       HPI:  Duane Mondragon is a 80y.o. year old woman seen today in follow up regarding back pain. Interval history: Since the last visit the patient has continued right buttock and low back pain. She is using Tylenol as needed. Last injection was in January 2023 and pain has gradually recurred. Today, the pain is rated Pain Score:   4 where 0 is no pain and 10 is pain as bad as it can be. The pain is located in the right buttock and right mid back,  does not radiate  and is described as sharp. This pain occurs all day. The symptoms have been worse since onset. Symptoms are exacerbated by moving, breathing. Factors which relieve the pain include nothing. Other associated symptoms include stiffness. Otherwise, the pain assessment has not changed since the last visit. Past Medical History:   Diagnosis Date    Asthma     COPD (chronic obstructive pulmonary disease) ([address])     Depression     Hyperlipidemia     Hypertension        Past Surgical History:   Procedure Laterality Date    CATARACT REMOVAL WITH IMPLANT      right eye    EYE SURGERY      HYSTERECTOMY (CERVIX STATUS UNKNOWN)         Social History     Tobacco Use    Smoking status: Never    Smokeless tobacco: Never   Vaping Use    Vaping Use: Never used   Substance Use Topics    Alcohol use: No     Comment: 1 cup of coffee a day     Drug use: No       No family history on file.     Current Outpatient Medications   Medication Sig Dispense Refill    erythromycin (ROMYCIN) 5 MG/GM ophthalmic ointment       meloxicam (MOBIC) 15 MG tablet Take 1 tablet by mouth every morning 90 tablet 1    memantine (NAMENDA) 10 MG tablet Take 1 tablet by mouth 2 times daily 180 tablet 1    donepezil (ARICEPT) 10 MG tablet Take 1

## 2023-09-21 RX ORDER — LIDOCAINE HYDROCHLORIDE 10 MG/ML
4 INJECTION, SOLUTION EPIDURAL; INFILTRATION; INTRACAUDAL; PERINEURAL ONCE
Status: COMPLETED | OUTPATIENT
Start: 2023-09-21 | End: 2023-09-21

## 2023-09-21 RX ORDER — TRIAMCINOLONE ACETONIDE 40 MG/ML
40 INJECTION, SUSPENSION INTRA-ARTICULAR; INTRAMUSCULAR ONCE
Status: COMPLETED | OUTPATIENT
Start: 2023-09-21 | End: 2023-09-21

## 2023-09-21 RX ADMIN — TRIAMCINOLONE ACETONIDE 40 MG: 40 INJECTION, SUSPENSION INTRA-ARTICULAR; INTRAMUSCULAR at 15:30

## 2023-09-21 RX ADMIN — LIDOCAINE HYDROCHLORIDE 4 ML: 10 INJECTION, SOLUTION EPIDURAL; INFILTRATION; INTRACAUDAL; PERINEURAL at 15:29

## 2023-10-30 DIAGNOSIS — J41.0 SIMPLE CHRONIC BRONCHITIS (HCC): ICD-10-CM

## 2023-10-30 RX ORDER — UMECLIDINIUM BROMIDE AND VILANTEROL TRIFENATATE 62.5; 25 UG/1; UG/1
1 POWDER RESPIRATORY (INHALATION) DAILY
Qty: 1 EACH | Refills: 2 | Status: SHIPPED | OUTPATIENT
Start: 2023-10-30

## 2023-10-30 NOTE — TELEPHONE ENCOUNTER
Last Appointment:  7/17/2023  Future Appointments  11/29/2023 10:30 AM   [facility] PALLIATIVE PROVIDER     [facility] PALLIAT[facility]        [facility]  12/15/2023 3:30 PM    Luz Hwang MD     [facility]. Emmy         [facility]  1/29/2024  1:00 PM    Azul Morales, [address] [facility]

## 2023-12-13 ENCOUNTER — TELEPHONE (OUTPATIENT)
Dept: GERIATRIC MEDICINE | Age: 88
End: 2023-12-13

## 2023-12-13 DIAGNOSIS — N30.00 ACUTE CYSTITIS WITHOUT HEMATURIA: Primary | ICD-10-CM

## 2023-12-13 NOTE — TELEPHONE ENCOUNTER
Called daughter to remind her about Friday's OV appt. States pt has been having urinary frequency. Would  order urine testing? [facility]

## 2023-12-15 ENCOUNTER — OFFICE VISIT (OUTPATIENT)
Dept: GERIATRIC MEDICINE | Age: 88
End: 2023-12-15
Payer: MEDICARE

## 2023-12-15 VITALS
DIASTOLIC BLOOD PRESSURE: 72 MMHG | RESPIRATION RATE: 16 BRPM | TEMPERATURE: 98.1 F | HEART RATE: 68 BPM | BODY MASS INDEX: 21.65 KG/M2 | WEIGHT: 130.1 LBS | SYSTOLIC BLOOD PRESSURE: 138 MMHG

## 2023-12-15 DIAGNOSIS — R53.83 TIRED: Primary | ICD-10-CM

## 2023-12-15 DIAGNOSIS — N30.00 ACUTE CYSTITIS WITHOUT HEMATURIA: ICD-10-CM

## 2023-12-15 DIAGNOSIS — I50.30 DIASTOLIC CONGESTIVE HEART FAILURE, UNSPECIFIED HF CHRONICITY (HCC): ICD-10-CM

## 2023-12-15 DIAGNOSIS — R41.0 CONFUSION: ICD-10-CM

## 2023-12-15 DIAGNOSIS — Z91.81 AT HIGH RISK FOR FALLS: ICD-10-CM

## 2023-12-15 LAB
ABSOLUTE IMMATURE GRANULOCYTE: 0.03 K/UL (ref 0–0.58)
ALBUMIN SERPL-MCNC: 4.5 G/DL (ref 3.5–5.2)
ALP BLD-CCNC: 82 U/L (ref 35–104)
ALT SERPL-CCNC: 14 U/L (ref 0–32)
ANION GAP SERPL CALCULATED.3IONS-SCNC: 11 MMOL/L (ref 7–16)
AST SERPL-CCNC: 15 U/L (ref 0–31)
BACTERIA: ABNORMAL
BASOPHILS ABSOLUTE: 0.03 K/UL (ref 0–0.2)
BASOPHILS RELATIVE PERCENT: 0 % (ref 0–2)
BILIRUB SERPL-MCNC: 0.2 MG/DL (ref 0–1.2)
BILIRUBIN URINE: NEGATIVE
BUN BLDV-MCNC: 16 MG/DL (ref 6–23)
CALCIUM SERPL-MCNC: 9.5 MG/DL (ref 8.6–10.2)
CHLORIDE BLD-SCNC: 102 MMOL/L (ref 98–107)
CO2: 25 MMOL/L (ref 22–29)
COLOR: YELLOW
CREAT SERPL-MCNC: 0.6 MG/DL (ref 0.5–1)
EOSINOPHILS ABSOLUTE: 0.16 K/UL (ref 0.05–0.5)
EOSINOPHILS RELATIVE PERCENT: 2 % (ref 0–6)
GFR SERPL CREATININE-BSD FRML MDRD: >60 ML/MIN/1.73M2
GLUCOSE BLD-MCNC: 101 MG/DL (ref 74–99)
GLUCOSE URINE: NEGATIVE MG/DL
HCT VFR BLD CALC: 42 % (ref 34–48)
HEMOGLOBIN: 13.8 G/DL (ref 11.5–15.5)
IMMATURE GRANULOCYTES: 0 % (ref 0–5)
KETONES, URINE: NEGATIVE MG/DL
LEUKOCYTE ESTERASE, URINE: NEGATIVE
LYMPHOCYTES ABSOLUTE: 2 K/UL (ref 1.5–4)
LYMPHOCYTES RELATIVE PERCENT: 25 % (ref 20–42)
MCH RBC QN AUTO: 30.3 PG (ref 26–35)
MCHC RBC AUTO-ENTMCNC: 32.9 G/DL (ref 32–34.5)
MCV RBC AUTO: 92.3 FL (ref 80–99.9)
MONOCYTES ABSOLUTE: 0.58 K/UL (ref 0.1–0.95)
MONOCYTES RELATIVE PERCENT: 7 % (ref 2–12)
NEUTROPHILS ABSOLUTE: 5.28 K/UL (ref 1.8–7.3)
NEUTROPHILS RELATIVE PERCENT: 65 % (ref 43–80)
NITRITE, URINE: NEGATIVE
PDW BLD-RTO: 13 % (ref 11.5–15)
PH UA: 7.5 (ref 5–9)
PLATELET # BLD: 186 K/UL (ref 130–450)
PMV BLD AUTO: 12.1 FL (ref 7–12)
POTASSIUM SERPL-SCNC: 3.9 MMOL/L (ref 3.5–5)
PRO-BNP: 118 PG/ML (ref 0–450)
PROTEIN UA: NEGATIVE MG/DL
RBC # BLD: 4.55 M/UL (ref 3.5–5.5)
RBC UA: ABNORMAL /HPF
SODIUM BLD-SCNC: 138 MMOL/L (ref 132–146)
SPECIFIC GRAVITY UA: 1.01 (ref 1–1.03)
TOTAL PROTEIN: 6.6 G/DL (ref 6.4–8.3)
TSH SERPL DL<=0.05 MIU/L-ACNC: 1.11 UIU/ML (ref 0.27–4.2)
TURBIDITY: CLEAR
URINE HGB: NEGATIVE
UROBILINOGEN, URINE: 0.2 EU/DL (ref 0–1)
VITAMIN B-12: 1118 PG/ML (ref 211–946)
WBC # BLD: 8.1 K/UL (ref 4.5–11.5)
WBC UA: ABNORMAL /HPF

## 2023-12-15 PROCEDURE — 36415 COLL VENOUS BLD VENIPUNCTURE: CPT | Performed by: INTERNAL MEDICINE

## 2023-12-15 PROCEDURE — 1123F ACP DISCUSS/DSCN MKR DOCD: CPT | Performed by: INTERNAL MEDICINE

## 2023-12-15 PROCEDURE — 1036F TOBACCO NON-USER: CPT | Performed by: INTERNAL MEDICINE

## 2023-12-15 PROCEDURE — G8427 DOCREV CUR MEDS BY ELIG CLIN: HCPCS | Performed by: INTERNAL MEDICINE

## 2023-12-15 PROCEDURE — G8484 FLU IMMUNIZE NO ADMIN: HCPCS | Performed by: INTERNAL MEDICINE

## 2023-12-15 PROCEDURE — G8420 CALC BMI NORM PARAMETERS: HCPCS | Performed by: INTERNAL MEDICINE

## 2023-12-15 PROCEDURE — 99213 OFFICE O/P EST LOW 20 MIN: CPT | Performed by: INTERNAL MEDICINE

## 2023-12-15 PROCEDURE — 1090F PRES/ABSN URINE INCON ASSESS: CPT | Performed by: INTERNAL MEDICINE

## 2023-12-15 RX ORDER — ASPIRIN 81 MG/1
81 TABLET ORAL DAILY
COMMUNITY

## 2023-12-15 NOTE — PROGRESS NOTES
CC REason for tremor     Here with daughter who lives closeby  Restless nights and a \"nervous nellie\"  Sundowning at night and may be severe  Shaking her limbs yesterday and trembles for an hour and very confused  Not on albuterol  [facility] mornings   \"Aches from shoulders to knees\"  Ibuprofen and Tylenol not effective  Ests longer in the AM and shakiness worse  Aware of  death 5  years ago  Seeing people hallucinating for a few months   Weight stable   Tomasz, great grandson lives with her   And GORDON on arising   HYM? \"Good\"  Neck negative  H&L clear  Abdomen   Extremities Negative  Hx of decline   And ADL independent  Impression;  Do not believe tremor is from PD or essential although there is a family Hx                      SDAT    Plan; May consider decreasing Aricept as a rare side effect causing tremor

## 2023-12-15 NOTE — PROGRESS NOTES
Blood work, as ordered, drawn during [address] & sent to lab.

## 2023-12-15 NOTE — PROGRESS NOTES
Chief Complaint   Patient presents with    6 Month Follow-Up     June follow up. Here with daughter, Fernando Martinez. Per daughter, pt has had a couple of \"really shaky\" days (physical shaking). Happening more frequently, shaking lasting longer. Daughter also reports sundowning is worse. Results     UA, Micro, C&S has been ordered -- just obtained today and in process. SOB     Daughter states pt has been \"winded\" more often the past 3 months. Also states at times her pulse races -- started ASA 81 mg daily. Per daughter, pt has a cardiologist but has not seen them in approx 1 year. Missed the last appt with PCP. Generalized Body Aches     Daily c/o aching, per daughter. Dr Acosta Rangel notified of above prior to his seeing the pt.

## 2023-12-17 LAB
CULTURE: NORMAL
SPECIMEN DESCRIPTION: NORMAL

## 2024-01-01 RX ORDER — OMEPRAZOLE 40 MG/1
40 CAPSULE, DELAYED RELEASE ORAL DAILY
Qty: 90 CAPSULE | OUTPATIENT
Start: 2024-01-01

## 2024-01-02 ENCOUNTER — OFFICE VISIT (OUTPATIENT)
Dept: FAMILY MEDICINE CLINIC | Age: 89
End: 2024-01-02
Payer: MEDICARE

## 2024-01-02 VITALS
BODY MASS INDEX: 21.99 KG/M2 | RESPIRATION RATE: 17 BRPM | SYSTOLIC BLOOD PRESSURE: 162 MMHG | TEMPERATURE: 97.5 F | DIASTOLIC BLOOD PRESSURE: 95 MMHG | WEIGHT: 132 LBS | HEART RATE: 76 BPM | OXYGEN SATURATION: 97 % | HEIGHT: 65 IN
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
## 2024-01-02 DIAGNOSIS — R07.81 RIB PAIN ON RIGHT SIDE: ICD-10-CM

## 2024-01-02 PROCEDURE — 1036F TOBACCO NON-USER: CPT

## 2024-01-02 PROCEDURE — 99213 OFFICE O/P EST LOW 20 MIN: CPT

## 2024-01-02 PROCEDURE — 1123F ACP DISCUSS/DSCN MKR DOCD: CPT

## 2024-01-02 PROCEDURE — G8427 DOCREV CUR MEDS BY ELIG CLIN: HCPCS

## 2024-01-02 PROCEDURE — 96372 THER/PROPH/DIAG INJ SC/IM: CPT

## 2024-01-02 PROCEDURE — G8484 FLU IMMUNIZE NO ADMIN: HCPCS

## 2024-01-02 PROCEDURE — G8420 CALC BMI NORM PARAMETERS: HCPCS

## 2024-01-02 PROCEDURE — 1090F PRES/ABSN URINE INCON ASSESS: CPT

## 2024-01-02 RX ORDER — DEXAMETHASONE SODIUM PHOSPHATE 10 MG/ML
10 INJECTION INTRAMUSCULAR; INTRAVENOUS ONCE
Status: COMPLETED | OUTPATIENT
Start: 2024-01-02 | End: 2024-01-02

## 2024-01-02 RX ORDER — DOXYCYCLINE HYCLATE 100 MG/1
CAPSULE ORAL
COMMUNITY
Start: 2023-12-27

## 2024-01-02 RX ORDER — METHYLPREDNISOLONE 4 MG/1
TABLET ORAL
COMMUNITY
Start: 2023-12-27

## 2024-01-02 RX ADMIN — DEXAMETHASONE SODIUM PHOSPHATE 10 MG: 10 INJECTION INTRAMUSCULAR; INTRAVENOUS at 12:33

## 2024-01-02 NOTE — PROGRESS NOTES
Chief Complaint   Back Pain (Fell against cabinets on Wednesday and has been having severe back pain under scapula of right shoulder. Also has been coughing) and Cough (Severe cough x 1 week)      History of Present Illness   Source of history provided by:  patient.        Emilee Maciel is a 89 y.o. old female presenting to the walk in clinic for evaluation of right rib pain for the past 6 days. Pt states injury occurred while fell against a cabinet. Denies any dizziness, lightheadedness, syncope, CP, shortness of breath, or other symptoms preceding fall. Denies any head injury or loss of consciousness. Denies injury to any other area of the body. Pt has not had problems in the past.  Pt states the pain is worse with movement and improves with rest. Pain is related to respiration. Since onset, the pain has been ongoing. There is no radiation of the pain. Pt denies any bowel/bladder incontinence, abdominal pain, hematuria, N/V/D, fever, chills, HA, neck pain, recent illness, dysuria, or lethargy.    ROS    Unless otherwise stated in this report or unable to obtain because of the patient's clinical or mental status as evidenced by the medical record, this patients's positive and negative responses for Review of Systems, constitutional, psych, eyes, ENT, cardiovascular, respiratory, gastrointestinal, neurological, genitourinary, musculoskeletal, integument systems and systems related to the presenting problem are either stated in the preceding or were not pertinent or were negative for the symptoms and/or complaints related to the medical problem.      Physical Exam         VS:  BP (!) 162/95   Pulse 76   Temp 97.5 °F (36.4 °C) (Infrared)   Resp 17   Ht 1.651 m (5' 5\")   Wt 59.9 kg (132 lb)   SpO2 97%   BMI 21.97 kg/m²    Oxygen Saturation Interpretation: Normal.    Constitutional:  Alert, development consistent with age.  Eyes:  PERRL, EOMI, no discharge or conjunctival injection.  Ears:  External ears

## 2024-01-02 NOTE — PROGRESS NOTES
Chief Complaint   Back Pain (Fell against cabinets on Wednesday and has been having severe back pain under scapula of right shoulder. Also has been coughing) and Cough (Severe cough x 1 week)      History of Present Illness   Source of history provided by:  {Information source:02853}.        Emilee Maciel is a 89 y.o. old female presenting to the walk in clinic for evaluation of *** back pain for the past 6 days. Acute cough for one week. Pt fell against cabinets as known injury. Pt has *** had back problems in the past.  Pt states the pain is worse with movement and improves with lying flat. There is *** radiation of the pain into the buttocks/leg. Pt denies any bowel/bladder incontinence, abdominal pain, hematuria, N/V/D, fever, chills, HA, neck pain, recent illness, dysuria, or lethargy.    ROS    Unless otherwise stated in this report or unable to obtain because of the patient's clinical or mental status as evidenced by the medical record, this patients's positive and negative responses for Review of Systems, constitutional, psych, eyes, ENT, cardiovascular, respiratory, gastrointestinal, neurological, genitourinary, musculoskeletal, integument systems and systems related to the presenting problem are either stated in the preceding or were not pertinent or were negative for the symptoms and/or complaints related to the medical problem.      Physical Exam         VS:  BP (!) 162/95   Pulse 76   Temp 97.5 °F (36.4 °C) (Infrared)   Resp 17   Ht 1.651 m (5' 5\")   Wt 59.9 kg (132 lb)   SpO2 97%   BMI 21.97 kg/m²    Oxygen Saturation Interpretation: {Pulse ox analysis:90325}.    Constitutional:  Alert, development consistent with age.  Neck:  Normal ROM.  Supple.  No TTP.    Lungs:  Clear to auscultation and breath sounds equal.  Heart:  Regular rate and rhythm, normal heart sounds, without pathological murmurs, ectopy, gallops, or rubs.  Abdomen:  Soft, nontender, good bowel sounds.  No firm or pulsatile

## 2024-01-05 DIAGNOSIS — G30.1 MODERATE LATE ONSET ALZHEIMER'S DEMENTIA WITHOUT BEHAVIORAL DISTURBANCE, PSYCHOTIC DISTURBANCE, MOOD DISTURBANCE, OR ANXIETY (HCC): ICD-10-CM

## 2024-01-05 DIAGNOSIS — F02.B0 MODERATE LATE ONSET ALZHEIMER'S DEMENTIA WITHOUT BEHAVIORAL DISTURBANCE, PSYCHOTIC DISTURBANCE, MOOD DISTURBANCE, OR ANXIETY (HCC): ICD-10-CM

## 2024-01-05 RX ORDER — MEMANTINE HYDROCHLORIDE 10 MG/1
10 TABLET ORAL 2 TIMES DAILY
Qty: 180 TABLET | Refills: 1 | Status: SHIPPED | OUTPATIENT
Start: 2024-01-05

## 2024-01-05 NOTE — TELEPHONE ENCOUNTER
Last Appointment:  12/20/2023  Future Appointments  1/29/2024  1:00 PM    Jos Mcdermott APRN - [facility]         [facility]  6/17/2024  1:45 PM    Chris Márquez MD     [facility]  12/30/2024 1:00 PM    Uhsa Lomeli MD      [facility]

## 2024-02-06 ENCOUNTER — OFFICE VISIT (OUTPATIENT)
Dept: PHYSICAL MEDICINE AND REHAB | Age: 89
End: 2024-02-06
Payer: MEDICARE

## 2024-02-06 VITALS
WEIGHT: 132 LBS | SYSTOLIC BLOOD PRESSURE: 164 MMHG | DIASTOLIC BLOOD PRESSURE: 73 MMHG | BODY MASS INDEX: 21.97 KG/M2 | HEART RATE: 55 BPM

## 2024-02-06 DIAGNOSIS — M54.9 MID BACK PAIN: Primary | ICD-10-CM

## 2024-02-06 PROCEDURE — 99214 OFFICE O/P EST MOD 30 MIN: CPT | Performed by: PHYSICAL MEDICINE & REHABILITATION

## 2024-02-06 PROCEDURE — G8484 FLU IMMUNIZE NO ADMIN: HCPCS | Performed by: PHYSICAL MEDICINE & REHABILITATION

## 2024-02-06 PROCEDURE — 1036F TOBACCO NON-USER: CPT | Performed by: PHYSICAL MEDICINE & REHABILITATION

## 2024-02-06 PROCEDURE — G8427 DOCREV CUR MEDS BY ELIG CLIN: HCPCS | Performed by: PHYSICAL MEDICINE & REHABILITATION

## 2024-02-06 PROCEDURE — G8420 CALC BMI NORM PARAMETERS: HCPCS | Performed by: PHYSICAL MEDICINE & REHABILITATION

## 2024-02-06 PROCEDURE — 1090F PRES/ABSN URINE INCON ASSESS: CPT | Performed by: PHYSICAL MEDICINE & REHABILITATION

## 2024-02-06 PROCEDURE — 1123F ACP DISCUSS/DSCN MKR DOCD: CPT | Performed by: PHYSICAL MEDICINE & REHABILITATION

## 2024-02-06 NOTE — PROGRESS NOTES
Noreen Jaeger D.O.  [facility]  [address] [place] [address]  Phone: [phone]  Fax: [phone]        2/7/24    Chief Complaint   Patient presents with    Back Pain     F/U back  med refill        HPI:  Emilee Maciel is a 89 y.o. year old woman seen today in follow up regarding back pain.     Interval history: Since the last visit the patient has continued right buttock and low back pain. She had a fall a few weeks ago and her mid and lower back pain have increased since that time.  She is using Tylenol and Tylenol #2 as needed.  L Today, the pain is rated Pain Score:   8 where 0 is no pain and 10 is pain as bad as it can be. The pain is located in the right buttock and right mid back,  does not radiate  and is described as sharp.  This pain occurs all day. The symptoms have been worse since onset.  Symptoms are exacerbated by moving, breathing. Factors which relieve the pain include nothing. Other associated symptoms include stiffness.  Otherwise, the pain assessment has not changed since the last visit.   PMH,PSH,FH,SH,Medications and      Review of Systems:  No new weakness, paresthesia, incontinence of bowel or bladder, saddle anesthesia, falls or gait dysfunction. Otherwise, per HPI.     Physical Exam:   Blood pressure (!) 164/73, pulse 55, weight 59.9 kg (132 lb), not currently breastfeeding.  GENERAL: The patient is in no apparent distress. Body habitus is non-obese.  MSK: There is no joint effusion, deformity, instability, swelling, erythema or warmth.  AROM limited in flexion to 30*, extension to neutral and bilateral lateral flexion 10*. Spinal curvatures reveal increased thoracic kyphosis.   Exquisite tenderness right SI joint.  No midline bony tenderness. SLR is negative.   NEURO: Gait is normal. No focal sensorimotor deficit.  Reflexes 2+ and symmetric in lower extremities.     Impression:   1. Mid back pain            Plan:    Controlled

## 2024-02-07 ENCOUNTER — TELEPHONE (OUTPATIENT)
Dept: PHYSICAL MEDICINE AND REHAB | Age: 89
End: 2024-02-07

## 2024-02-07 RX ORDER — ACETAMINOPHEN AND CODEINE PHOSPHATE 300; 30 MG/1; MG/1
1 TABLET ORAL DAILY PRN
Qty: 30 TABLET | Refills: 1 | Status: SHIPPED | OUTPATIENT
Start: 2024-02-07 | End: 2024-04-07

## 2024-02-07 NOTE — TELEPHONE ENCOUNTER
Daughter called in stating the medication you were going to prescribe was not at the pharmacy yet, requesting if you can send prescription ASAP please for her mother

## 2024-03-14 DIAGNOSIS — J41.0 SIMPLE CHRONIC BRONCHITIS (HCC): ICD-10-CM

## 2024-03-14 RX ORDER — UMECLIDINIUM BROMIDE AND VILANTEROL TRIFENATATE 62.5; 25 UG/1; UG/1
1 POWDER RESPIRATORY (INHALATION) DAILY
Qty: 60 EACH | Refills: 1 | Status: SHIPPED | OUTPATIENT
Start: 2024-03-14

## 2024-03-29 DIAGNOSIS — F02.B0 MODERATE LATE ONSET ALZHEIMER'S DEMENTIA WITHOUT BEHAVIORAL DISTURBANCE, PSYCHOTIC DISTURBANCE, MOOD DISTURBANCE, OR ANXIETY (HCC): ICD-10-CM

## 2024-03-29 DIAGNOSIS — G30.1 MODERATE LATE ONSET ALZHEIMER'S DEMENTIA WITHOUT BEHAVIORAL DISTURBANCE, PSYCHOTIC DISTURBANCE, MOOD DISTURBANCE, OR ANXIETY (HCC): ICD-10-CM

## 2024-03-29 RX ORDER — DONEPEZIL HYDROCHLORIDE 5 MG/1
5 TABLET, FILM COATED ORAL NIGHTLY
Qty: 90 TABLET | Refills: 1 | Status: SHIPPED | OUTPATIENT
Start: 2024-03-29

## 2024-04-22 ENCOUNTER — TELEPHONE (OUTPATIENT)
Dept: FAMILY MEDICINE CLINIC | Age: 89
End: 2024-04-22

## 2024-04-22 DIAGNOSIS — R39.15 URINARY URGENCY: Primary | ICD-10-CM

## 2024-04-22 NOTE — TELEPHONE ENCOUNTER
Pt's daughter is requesting a call back to review medications.  Daughter states that dementia has gotten worse.  Anxiety is very high.  Please give daughter a call.

## 2024-04-23 RX ORDER — DIVALPROEX SODIUM 125 MG/1
125 CAPSULE, COATED PELLETS ORAL NIGHTLY
Qty: 30 CAPSULE | Refills: 1 | Status: SHIPPED
Start: 2024-04-23 | End: 2024-04-24

## 2024-04-23 NOTE — TELEPHONE ENCOUNTER
Called and spoke to Ashley about Emilee. No episodes of heart racing. Physically is doing ok. Mentally and emotionally is doing a lot worse. Her anxiety in the evening especially is not good. Has a hard time calming down . Wants someone to sit in the room with her. Is getting afraid. Her grandson got a job . Has new aids coming in the evening . But these are new people . Is not alone at all. Has a lot of anxiety about where she is and no realizing that she is at home. Will trial depakote sprinkles at bedtime . If no improvement will let me know . She has also been complaining about needing to urinate more and having urgency. Will check urine culture.     All questions answered.

## 2024-04-24 RX ORDER — DIVALPROEX SODIUM 125 MG/1
125 CAPSULE, COATED PELLETS ORAL NIGHTLY
Qty: 90 CAPSULE | Refills: 1 | Status: SHIPPED | OUTPATIENT
Start: 2024-04-24

## 2024-05-07 DIAGNOSIS — R39.15 URINARY URGENCY: ICD-10-CM

## 2024-05-09 LAB
CULTURE: NORMAL
SPECIMEN DESCRIPTION: NORMAL

## 2024-06-13 DIAGNOSIS — J41.0 SIMPLE CHRONIC BRONCHITIS (HCC): ICD-10-CM

## 2024-06-13 RX ORDER — UMECLIDINIUM BROMIDE AND VILANTEROL TRIFENATATE 62.5; 25 UG/1; UG/1
POWDER RESPIRATORY (INHALATION)
Qty: 60 EACH | Refills: 1 | Status: SHIPPED | OUTPATIENT
Start: 2024-06-13

## 2024-06-17 ENCOUNTER — OFFICE VISIT (OUTPATIENT)
Dept: GERIATRIC MEDICINE | Age: 89
End: 2024-06-17
Payer: MEDICARE

## 2024-06-17 VITALS
DIASTOLIC BLOOD PRESSURE: 70 MMHG | SYSTOLIC BLOOD PRESSURE: 138 MMHG | RESPIRATION RATE: 16 BRPM | BODY MASS INDEX: 21.87 KG/M2 | TEMPERATURE: 97.8 F | HEART RATE: 56 BPM | WEIGHT: 131.4 LBS

## 2024-06-17 DIAGNOSIS — G30.1 MODERATE LATE ONSET ALZHEIMER'S DEMENTIA WITHOUT BEHAVIORAL DISTURBANCE, PSYCHOTIC DISTURBANCE, MOOD DISTURBANCE, OR ANXIETY (HCC): ICD-10-CM

## 2024-06-17 DIAGNOSIS — G40.89 OTHER SEIZURES (HCC): ICD-10-CM

## 2024-06-17 DIAGNOSIS — I47.10 SVT (SUPRAVENTRICULAR TACHYCARDIA) (HCC): ICD-10-CM

## 2024-06-17 DIAGNOSIS — J41.0 SIMPLE CHRONIC BRONCHITIS (HCC): ICD-10-CM

## 2024-06-17 DIAGNOSIS — F11.20 OPIOID DEPENDENCE WITH CURRENT USE (HCC): ICD-10-CM

## 2024-06-17 DIAGNOSIS — F02.80 ALZHEIMER DISEASE (HCC): Primary | ICD-10-CM

## 2024-06-17 DIAGNOSIS — F32.1 MODERATE SINGLE CURRENT EPISODE OF MAJOR DEPRESSIVE DISORDER (HCC): ICD-10-CM

## 2024-06-17 DIAGNOSIS — M35.3 PMR (POLYMYALGIA RHEUMATICA) (HCC): ICD-10-CM

## 2024-06-17 DIAGNOSIS — I50.30 DIASTOLIC CONGESTIVE HEART FAILURE, UNSPECIFIED HF CHRONICITY (HCC): ICD-10-CM

## 2024-06-17 DIAGNOSIS — I73.9 PERIPHERAL VASCULAR DISEASE, UNSPECIFIED (HCC): ICD-10-CM

## 2024-06-17 DIAGNOSIS — G30.9 ALZHEIMER DISEASE (HCC): Primary | ICD-10-CM

## 2024-06-17 DIAGNOSIS — F02.B0 MODERATE LATE ONSET ALZHEIMER'S DEMENTIA WITHOUT BEHAVIORAL DISTURBANCE, PSYCHOTIC DISTURBANCE, MOOD DISTURBANCE, OR ANXIETY (HCC): ICD-10-CM

## 2024-06-17 PROCEDURE — 1123F ACP DISCUSS/DSCN MKR DOCD: CPT | Performed by: INTERNAL MEDICINE

## 2024-06-17 PROCEDURE — G8420 CALC BMI NORM PARAMETERS: HCPCS | Performed by: INTERNAL MEDICINE

## 2024-06-17 PROCEDURE — 1036F TOBACCO NON-USER: CPT | Performed by: INTERNAL MEDICINE

## 2024-06-17 PROCEDURE — 1090F PRES/ABSN URINE INCON ASSESS: CPT | Performed by: INTERNAL MEDICINE

## 2024-06-17 PROCEDURE — G8427 DOCREV CUR MEDS BY ELIG CLIN: HCPCS | Performed by: INTERNAL MEDICINE

## 2024-06-17 PROCEDURE — 99213 OFFICE O/P EST LOW 20 MIN: CPT | Performed by: INTERNAL MEDICINE

## 2024-06-17 PROCEDURE — 3023F SPIROM DOC REV: CPT | Performed by: INTERNAL MEDICINE

## 2024-06-17 ASSESSMENT — PATIENT HEALTH QUESTIONNAIRE - PHQ9: DEPRESSION UNABLE TO ASSESS: FUNCTIONAL CAPACITY MOTIVATION LIMITS ACCURACY

## 2024-06-17 NOTE — PROGRESS NOTES
Chief Complaint   Patient presents with    6 Month Follow-Up     December follow up.  Here with daughter.  In conversation, pt stated she lives alone, inquired if still living in [place].  Daughter states grandson lives with pt & Ashley is at the house daily.  Per daughter, pt moved to this area 15 years ago.      Cough     Daughter reports that pt coughs intermittently - dry cough, & at times tells her it feels like her food gets stuck in her throat.      Difficulty Walking     Pt has leg shaking, tremors, leg cramps - per daughter.  Ambulation progressively getting worse the past 3 months.      Diarrhea     Per daughter, pt has had 2 or 3 episodes of diarrhea in the last 2.5 weeks.  Once it was soft & loose, once totally liquid.      Anxiety     Increased anxiety, per daughter.  Has difficulty laying down at night to sleep.

## 2024-06-17 NOTE — PROGRESS NOTES
CC Recheck memory    Mostly confusion about where she is and night time sundowning  Daughter here without sleep , and runs a wine business  No ETOH  Days and night reversal  Balance an issue  Toileting with some help  Showers with daughter's help  Thinks she is sometimes in ort [place]  Dead have come back to life at times  Videos of events at night  Prema in sundowning day and night  Depakote 125 mg hs helped iiiiinitially  Day and night  Considr stopping MAg Oxide with breakthrough diarrhea  Impression; Progressive SDAT and sleeplessness                      With worsening daytime anxiety  Plan: Aricept 5 mg a day           Namenda 20 mg a day           Depakote to 250 mg hs           Zoloft to 50 mg bid           Call if no better and consider Seroquel later if no better

## 2024-06-25 DIAGNOSIS — F32.1 MODERATE SINGLE CURRENT EPISODE OF MAJOR DEPRESSIVE DISORDER (HCC): ICD-10-CM

## 2024-06-25 DIAGNOSIS — F02.B0 MODERATE LATE ONSET ALZHEIMER'S DEMENTIA WITHOUT BEHAVIORAL DISTURBANCE, PSYCHOTIC DISTURBANCE, MOOD DISTURBANCE, OR ANXIETY (HCC): ICD-10-CM

## 2024-06-25 DIAGNOSIS — G30.1 MODERATE LATE ONSET ALZHEIMER'S DEMENTIA WITHOUT BEHAVIORAL DISTURBANCE, PSYCHOTIC DISTURBANCE, MOOD DISTURBANCE, OR ANXIETY (HCC): ICD-10-CM

## 2024-06-25 PROBLEM — G40.89 OTHER SEIZURES (HCC): Status: ACTIVE | Noted: 2024-06-25

## 2024-06-25 PROBLEM — R56.9 UNSPECIFIED CONVULSIONS (HCC): Status: ACTIVE | Noted: 2024-06-25

## 2024-06-25 PROBLEM — M35.3 PMR (POLYMYALGIA RHEUMATICA) (HCC): Status: ACTIVE | Noted: 2024-06-25

## 2024-06-25 PROBLEM — I50.30 DIASTOLIC CONGESTIVE HEART FAILURE, UNSPECIFIED HF CHRONICITY (HCC): Status: ACTIVE | Noted: 2024-06-25

## 2024-06-25 NOTE — TELEPHONE ENCOUNTER
Please refill :)  Last Appointment:  12/20/2023  Future Appointments   Date Time Provider Department Center   12/16/2024  2:15 PM Chris Márquez MD [facility]   12/30/2024  1:00 PM Usha Lomeli MD [facility]

## 2024-06-29 DIAGNOSIS — G30.1 MODERATE LATE ONSET ALZHEIMER'S DEMENTIA WITHOUT BEHAVIORAL DISTURBANCE, PSYCHOTIC DISTURBANCE, MOOD DISTURBANCE, OR ANXIETY (HCC): ICD-10-CM

## 2024-06-29 DIAGNOSIS — F02.B0 MODERATE LATE ONSET ALZHEIMER'S DEMENTIA WITHOUT BEHAVIORAL DISTURBANCE, PSYCHOTIC DISTURBANCE, MOOD DISTURBANCE, OR ANXIETY (HCC): ICD-10-CM

## 2024-07-01 RX ORDER — MELOXICAM 15 MG/1
15 TABLET ORAL EVERY MORNING
Qty: 90 TABLET | Refills: 1 | Status: SHIPPED | OUTPATIENT
Start: 2024-07-01

## 2024-07-01 RX ORDER — MEMANTINE HYDROCHLORIDE 10 MG/1
10 TABLET ORAL 2 TIMES DAILY
Qty: 180 TABLET | Refills: 1 | Status: SHIPPED | OUTPATIENT
Start: 2024-07-01

## 2024-07-05 DIAGNOSIS — F02.B0 MODERATE LATE ONSET ALZHEIMER'S DEMENTIA WITHOUT BEHAVIORAL DISTURBANCE, PSYCHOTIC DISTURBANCE, MOOD DISTURBANCE, OR ANXIETY (HCC): ICD-10-CM

## 2024-07-05 DIAGNOSIS — G30.1 MODERATE LATE ONSET ALZHEIMER'S DEMENTIA WITHOUT BEHAVIORAL DISTURBANCE, PSYCHOTIC DISTURBANCE, MOOD DISTURBANCE, OR ANXIETY (HCC): ICD-10-CM

## 2024-07-05 RX ORDER — MIRTAZAPINE 15 MG/1
15 TABLET, FILM COATED ORAL NIGHTLY
Qty: 90 TABLET | Refills: 3 | Status: SHIPPED | OUTPATIENT
Start: 2024-07-05

## 2024-07-05 NOTE — TELEPHONE ENCOUNTER
Last Appointment:  12/20/2023  Future Appointments   Date Time Provider Department Center   12/16/2024  2:15 PM Chris Márquez MD [facility]   12/30/2024  1:00 PM Usha Lomeli MD [facility]

## 2024-07-06 DIAGNOSIS — M81.0 AGE-RELATED OSTEOPOROSIS WITHOUT CURRENT PATHOLOGICAL FRACTURE: ICD-10-CM

## 2024-07-08 ENCOUNTER — TELEPHONE (OUTPATIENT)
Dept: GERIATRIC MEDICINE | Age: 89
End: 2024-07-08

## 2024-07-08 RX ORDER — DIVALPROEX SODIUM 125 MG/1
125 CAPSULE, COATED PELLETS ORAL 2 TIMES DAILY
Qty: 60 CAPSULE | Refills: 3 | Status: SHIPPED
Start: 2024-07-08 | End: 2024-07-09

## 2024-07-08 RX ORDER — ALENDRONATE SODIUM 70 MG/1
70 TABLET ORAL
Qty: 12 TABLET | Refills: 3 | Status: SHIPPED | OUTPATIENT
Start: 2024-07-08

## 2024-07-08 RX ORDER — SERTRALINE HYDROCHLORIDE 100 MG/1
100 TABLET, FILM COATED ORAL DAILY
Qty: 30 TABLET | Refills: 3 | Status: SHIPPED | OUTPATIENT
Start: 2024-07-08

## 2024-07-08 NOTE — TELEPHONE ENCOUNTER
Daughter left a VM message.  Reported that at last OV (6/17/24) Dr Márquez was going to increase the Divalproex from 125 mg to 250 mg and increase the Zoloft from 50 mg to 100 mg.  Please check OV note and send new scripts to the [facility] in [place].

## 2024-07-09 RX ORDER — DIVALPROEX SODIUM 125 MG/1
250 CAPSULE, COATED PELLETS ORAL EVERY EVENING
Qty: 180 CAPSULE | Refills: 3 | Status: SHIPPED
Start: 2024-07-09 | End: 2024-09-06 | Stop reason: CLARIF

## 2024-07-29 ENCOUNTER — TELEPHONE (OUTPATIENT)
Dept: GERIATRIC MEDICINE | Age: 89
End: 2024-07-29

## 2024-07-29 RX ORDER — QUETIAPINE FUMARATE 25 MG/1
25 TABLET, FILM COATED ORAL
Qty: 30 TABLET | Refills: 3 | Status: SHIPPED | OUTPATIENT
Start: 2024-07-29

## 2024-07-29 NOTE — TELEPHONE ENCOUNTER
Called Azalea back and let her know the changes of medications for the patient. She asked if the patient should continue taking the Zoloft with starting the Seroquel. Please advise

## 2024-07-29 NOTE — TELEPHONE ENCOUNTER
Patients daughter Azalea called stating the patient is having increased anxiety and can't seem to settle most days this goes on for about 6-10 hours.Its an all day occurrence and not just through the evening States she doesn't see any benefit with the patient since increasing Depakote. The patients care givers are reaching out to Azalea during the day letting her know the patient is very agitated, asked if there something else to try?

## 2024-07-29 NOTE — TELEPHONE ENCOUNTER
Called Azalea back. Per Dr Márquez it is okay for the patient to continue Zoloft. Azalea wanted to ask if she is supposed to completely stop Depakote or wean the patient down. Please advise

## 2024-07-29 NOTE — TELEPHONE ENCOUNTER
Spoke with daughter and told her to decrease depakote to one a day for 3 days then stop it. She will start Seroquel 25 mg a day.

## 2024-08-03 DIAGNOSIS — M81.0 AGE-RELATED OSTEOPOROSIS WITHOUT CURRENT PATHOLOGICAL FRACTURE: ICD-10-CM

## 2024-08-05 RX ORDER — ALENDRONATE SODIUM 70 MG/1
70 TABLET ORAL
Qty: 12 TABLET | Refills: 3 | Status: SHIPPED | OUTPATIENT
Start: 2024-08-05

## 2024-08-05 NOTE — TELEPHONE ENCOUNTER
Please refill :)    Last Appointment:  12/20/2023  Future Appointments   Date Time Provider Department Center   12/16/2024  2:15 PM Chris Márquez MD [facility]   12/30/2024  1:00 PM [facility]eli [facility]

## 2024-08-12 ENCOUNTER — TELEPHONE (OUTPATIENT)
Dept: PHARMACY | Facility: CLINIC | Age: 89
End: 2024-08-12

## 2024-08-12 NOTE — TELEPHONE ENCOUNTER
Ashley, pt's daughter, called in with questions about her mom's Seroquel.     She states that Seroquel makes her mom very tired, very quickly in the morning, and does not change her anxiety or aggressive behavior in the late afternoon and evening.     She would also like a complete medication review.    Scheduled appointment with pharmacist for 08.13.24 @ 1:00 PM. Appointment will be completed by Ashley.    Geni Garrett [facility]  Population Health    [facility]   [phone] option 1     For Pharmacy Admin Tracking Only    Program: [email or web address] [email or web address]  CPA in place:  No  Recommendation Provided To: Patient/Caregiver: 1 via Telephone  Intervention Detail: Scheduled Appointment  Intervention Accepted By: Patient/Caregiver: 1  Gap Closed?: Yes   Time Spent (min): 10

## 2024-08-13 ENCOUNTER — TELEPHONE (OUTPATIENT)
Dept: PHARMACY | Facility: CLINIC | Age: 89
End: 2024-08-13

## 2024-08-13 NOTE — TELEPHONE ENCOUNTER
aware of how they work to slow progression, not cure.  Stated that they planned to discuss with Dr. Márquez.  - Renal Dosing:No renal adjustments necessary.  - Medication Interactions:No clinically significant interactions identified via [employer] Interaction Analysis as category D or higher.    SHARIF Day, PharmD, BCACP  Ambulatory Care Clinical Pharmacist- [facility]  Department, toll free: [phone]      For Pharmacy Admin Tracking Only    Program: [email or web address]  CPA in place:  No  Gap Closed?: Yes   Time Spent (min): 60

## 2024-08-26 ENCOUNTER — TELEPHONE (OUTPATIENT)
Dept: FAMILY MEDICINE CLINIC | Age: 89
End: 2024-08-26

## 2024-09-05 ENCOUNTER — TELEPHONE (OUTPATIENT)
Dept: FAMILY MEDICINE CLINIC | Age: 89
End: 2024-09-05

## 2024-09-05 NOTE — TELEPHONE ENCOUNTER
Emilee is really struggling. Seems to be beyond what she knows to do for her. Shakes all the time. Tremor is severe. Worse in the morning. On and off all day. Legs are shaking so bad she can hardly stand.  Starts walking and then stops, does not know what to do next. Is complaining of legs aching even to light touch. Her cognitive decline is continuing . Seeing people  that are not there. Looking for her dog. Breathing is worse. Seroquel has been helping her sleep at night.     Took her off of the depakote this last week due to diarrhea and agitation.     Ok to come in tomorrow at 9:40 .

## 2024-09-05 NOTE — TELEPHONE ENCOUNTER
Daughter Ashley called office requesting you call her to provide advice regarding mother. Reports she is having severe tremors and shortness of breath. [phone]

## 2024-09-06 ENCOUNTER — OFFICE VISIT (OUTPATIENT)
Dept: FAMILY MEDICINE CLINIC | Age: 89
End: 2024-09-06

## 2024-09-06 ENCOUNTER — TELEPHONE (OUTPATIENT)
Dept: FAMILY MEDICINE CLINIC | Age: 89
End: 2024-09-06

## 2024-09-06 VITALS
RESPIRATION RATE: 17 BRPM | DIASTOLIC BLOOD PRESSURE: 80 MMHG | OXYGEN SATURATION: 96 % | HEIGHT: 65 IN | TEMPERATURE: 97.2 F | BODY MASS INDEX: 21.33 KG/M2 | WEIGHT: 128 LBS | SYSTOLIC BLOOD PRESSURE: 152 MMHG | HEART RATE: 59 BPM

## 2024-09-06 DIAGNOSIS — R39.15 URINARY URGENCY: ICD-10-CM

## 2024-09-06 DIAGNOSIS — R45.1 AGITATION: ICD-10-CM

## 2024-09-06 DIAGNOSIS — G30.1 MODERATE LATE ONSET ALZHEIMER'S DEMENTIA WITHOUT BEHAVIORAL DISTURBANCE, PSYCHOTIC DISTURBANCE, MOOD DISTURBANCE, OR ANXIETY (HCC): ICD-10-CM

## 2024-09-06 DIAGNOSIS — R45.1 AGITATION: Primary | ICD-10-CM

## 2024-09-06 DIAGNOSIS — R39.15 URINARY URGENCY: Primary | ICD-10-CM

## 2024-09-06 DIAGNOSIS — F02.B0 MODERATE LATE ONSET ALZHEIMER'S DEMENTIA WITHOUT BEHAVIORAL DISTURBANCE, PSYCHOTIC DISTURBANCE, MOOD DISTURBANCE, OR ANXIETY (HCC): ICD-10-CM

## 2024-09-06 PROBLEM — L97.909 LOWER EXTREMITY ULCERATION (HCC): Status: RESOLVED | Noted: 2023-03-28 | Resolved: 2024-09-06

## 2024-09-06 LAB
ALBUMIN: 4.6 G/DL (ref 3.5–5.2)
ALP BLD-CCNC: 71 U/L (ref 35–104)
ALT SERPL-CCNC: 12 U/L (ref 0–32)
ANION GAP SERPL CALCULATED.3IONS-SCNC: 13 MMOL/L (ref 7–16)
AST SERPL-CCNC: 17 U/L (ref 0–31)
BASOPHILS ABSOLUTE: 0.03 K/UL (ref 0–0.2)
BASOPHILS RELATIVE PERCENT: 1 % (ref 0–2)
BILIRUB SERPL-MCNC: 0.3 MG/DL (ref 0–1.2)
BILIRUBIN, URINE: NEGATIVE
BUN BLDV-MCNC: 6 MG/DL (ref 6–23)
CALCIUM SERPL-MCNC: 9.5 MG/DL (ref 8.6–10.2)
CHLORIDE BLD-SCNC: 97 MMOL/L (ref 98–107)
CO2: 24 MMOL/L (ref 22–29)
COLOR, UA: YELLOW
CREAT SERPL-MCNC: 0.5 MG/DL (ref 0.5–1)
EOSINOPHILS ABSOLUTE: 0.1 K/UL (ref 0.05–0.5)
EOSINOPHILS RELATIVE PERCENT: 2 % (ref 0–6)
EPITHELIAL CELLS, UA: ABNORMAL /HPF
GFR, ESTIMATED: 85 ML/MIN/1.73M2
GLUCOSE BLD-MCNC: 93 MG/DL (ref 74–99)
GLUCOSE URINE: NEGATIVE MG/DL
HCT VFR BLD CALC: 44.3 % (ref 34–48)
HEMOGLOBIN: 14.6 G/DL (ref 11.5–15.5)
IMMATURE GRANULOCYTES %: 0 % (ref 0–5)
IMMATURE GRANULOCYTES ABSOLUTE: <0.03 K/UL (ref 0–0.58)
KETONES, URINE: NEGATIVE MG/DL
LEUKOCYTE ESTERASE, URINE: ABNORMAL
LYMPHOCYTES ABSOLUTE: 1.54 K/UL (ref 1.5–4)
LYMPHOCYTES RELATIVE PERCENT: 28 % (ref 20–42)
MCH RBC QN AUTO: 30.4 PG (ref 26–35)
MCHC RBC AUTO-ENTMCNC: 33 G/DL (ref 32–34.5)
MCV RBC AUTO: 92.1 FL (ref 80–99.9)
MONOCYTES ABSOLUTE: 0.42 K/UL (ref 0.1–0.95)
MONOCYTES RELATIVE PERCENT: 8 % (ref 2–12)
NEUTROPHILS ABSOLUTE: 3.49 K/UL (ref 1.8–7.3)
NEUTROPHILS RELATIVE PERCENT: 62 % (ref 43–80)
NITRITE, URINE: NEGATIVE
PDW BLD-RTO: 13.2 % (ref 11.5–15)
PH, URINE: 7 (ref 5–9)
PLATELET # BLD: 202 K/UL (ref 130–450)
PMV BLD AUTO: 11.1 FL (ref 7–12)
POTASSIUM SERPL-SCNC: 4.2 MMOL/L (ref 3.5–5)
PROTEIN UA: NEGATIVE MG/DL
RBC # BLD: 4.81 M/UL (ref 3.5–5.5)
RBC UA: ABNORMAL /HPF
SODIUM BLD-SCNC: 134 MMOL/L (ref 132–146)
SPECIFIC GRAVITY UA: 1.01 (ref 1–1.03)
TOTAL PROTEIN: 6.8 G/DL (ref 6.4–8.3)
TURBIDITY: CLEAR
URINE HGB: NEGATIVE
UROBILINOGEN, URINE: 0.2 EU/DL (ref 0–1)
WBC # BLD: 5.6 K/UL (ref 4.5–11.5)
WBC UA: ABNORMAL /HPF

## 2024-09-06 SDOH — ECONOMIC STABILITY: FOOD INSECURITY: WITHIN THE PAST 12 MONTHS, THE FOOD YOU BOUGHT JUST DIDN'T LAST AND YOU DIDN'T HAVE MONEY TO GET MORE.: NEVER TRUE

## 2024-09-06 SDOH — ECONOMIC STABILITY: FOOD INSECURITY: WITHIN THE PAST 12 MONTHS, YOU WORRIED THAT YOUR FOOD WOULD RUN OUT BEFORE YOU GOT MONEY TO BUY MORE.: NEVER TRUE

## 2024-09-06 SDOH — ECONOMIC STABILITY: INCOME INSECURITY: HOW HARD IS IT FOR YOU TO PAY FOR THE VERY BASICS LIKE FOOD, HOUSING, MEDICAL CARE, AND HEATING?: NOT HARD AT ALL

## 2024-09-06 NOTE — TELEPHONE ENCOUNTER
Shantell from [facility] called asking for orders for urinalysis. Stated the patient dropped off urine but unable to find in Epic.

## 2024-09-08 LAB
CULTURE: NORMAL
SPECIMEN DESCRIPTION: NORMAL

## 2024-09-09 RX ORDER — SERTRALINE HYDROCHLORIDE 100 MG/1
100 TABLET, FILM COATED ORAL DAILY
Qty: 90 TABLET | Refills: 3 | Status: SHIPPED | OUTPATIENT
Start: 2024-09-09

## 2024-09-13 ENCOUNTER — TELEPHONE (OUTPATIENT)
Dept: FAMILY MEDICINE CLINIC | Age: 89
End: 2024-09-13

## 2024-09-14 ASSESSMENT — ENCOUNTER SYMPTOMS
SHORTNESS OF BREATH: 0
WHEEZING: 0
ABDOMINAL PAIN: 0
COUGH: 0

## 2024-09-16 RX ORDER — QUETIAPINE FUMARATE 25 MG/1
25 TABLET, FILM COATED ORAL 2 TIMES DAILY
Qty: 60 TABLET | Refills: 3 | Status: SHIPPED | OUTPATIENT
Start: 2024-09-16

## 2024-10-02 ENCOUNTER — TELEPHONE (OUTPATIENT)
Dept: FAMILY MEDICINE CLINIC | Age: 89
End: 2024-10-02

## 2024-10-02 DIAGNOSIS — G30.1 MODERATE LATE ONSET ALZHEIMER'S DEMENTIA WITHOUT BEHAVIORAL DISTURBANCE, PSYCHOTIC DISTURBANCE, MOOD DISTURBANCE, OR ANXIETY (HCC): Primary | ICD-10-CM

## 2024-10-02 DIAGNOSIS — Z11.1 SCREENING FOR TUBERCULOSIS: ICD-10-CM

## 2024-10-02 DIAGNOSIS — F02.B0 MODERATE LATE ONSET ALZHEIMER'S DEMENTIA WITHOUT BEHAVIORAL DISTURBANCE, PSYCHOTIC DISTURBANCE, MOOD DISTURBANCE, OR ANXIETY (HCC): Primary | ICD-10-CM

## 2024-10-02 NOTE — TELEPHONE ENCOUNTER
Patient is sleeping better through the night per Ashley. Seroquel is working. Is giving it to her at 4 pm and 8pm. Anxiety is still high . Will place referral to palliative care.

## 2024-10-03 ENCOUNTER — TELEPHONE (OUTPATIENT)
Dept: PALLATIVE CARE | Age: 89
End: 2024-10-03

## 2024-10-03 NOTE — TELEPHONE ENCOUNTER
Called and spoke with Emilee Ramirez' daughter. Emilee was seen by Palliative Care 7/26/2023 in clinic. Ashley shares her mother is having increased anxiety as her dementia progresses. Ashley states her mother is still ambulatory but not sure how she would be at the doctors office due to the increased anxiety. Offered [facility] and explained it was our NP and SW that did home visits once a month. Ashley feels that would help. Ashley states she has hired caregivers a few days a week in the mornings and at bedtime.

## 2024-10-10 ENCOUNTER — TELEPHONE (OUTPATIENT)
Dept: FAMILY MEDICINE CLINIC | Age: 89
End: 2024-10-10

## 2024-10-10 ENCOUNTER — OFFICE VISIT (OUTPATIENT)
Age: 89
End: 2024-10-10

## 2024-10-10 VITALS — BODY MASS INDEX: 21.33 KG/M2 | WEIGHT: 128 LBS | HEIGHT: 65 IN

## 2024-10-10 DIAGNOSIS — M25.512 ACUTE PAIN OF LEFT SHOULDER: ICD-10-CM

## 2024-10-10 DIAGNOSIS — S42.032A TRAUMATIC CLOSED FRACTURE OF DISTAL CLAVICLE WITH MINIMAL DISPLACEMENT, LEFT, INITIAL ENCOUNTER: Primary | ICD-10-CM

## 2024-10-10 DIAGNOSIS — R10.2 PELVIC PAIN: Primary | ICD-10-CM

## 2024-10-10 RX ORDER — TRAMADOL HYDROCHLORIDE 50 MG/1
50 TABLET ORAL EVERY 4 HOURS PRN
Qty: 42 TABLET | Refills: 0 | Status: SHIPPED | OUTPATIENT
Start: 2024-10-10 | End: 2024-10-17

## 2024-10-10 ASSESSMENT — ENCOUNTER SYMPTOMS
ABDOMINAL DISTENTION: 0
ALLERGIC/IMMUNOLOGIC NEGATIVE: 1
SHORTNESS OF BREATH: 0
EYE DISCHARGE: 0

## 2024-10-10 NOTE — TELEPHONE ENCOUNTER
Pt dtr called to state her mother fell Saturday or Sunday.  She fell again last night.  Pt unable to lift left arm.  Pt going to [facility] for an xray.

## 2024-10-10 NOTE — PROGRESS NOTES
Emilee Maciel (:  1929) is a 89 y.o. female,New patient, here for evaluation of the following chief complaint(s):  Pelvic Pain (Had a fall from a standing position last night, also hit her shoulder) and Shoulder Pain (Had a fall from a standing postion last night)      Assessment & Plan   ASSESSMENT/PLAN:  1. Traumatic closed fracture of distal clavicle with minimal displacement, left, initial encounter  -     traMADol (ULTRAM) 50 MG tablet; Take 1 tablet by mouth every 4 hours as needed for Pain for up to 7 days. Intended supply: 7 days. Take lowest dose possible to manage pain Max Daily Amount: 300 mg, Disp-42 tablet, R-0Normal    Sling at all times  No use of arm  No ROM    RX sent for Ultram - start with tylenol first    Return in about 2 weeks (around 10/24/2024).         Subjective   SUBJECTIVE/OBJECTIVE:  HPI    89-year-old female presents the walk-in clinic after a fall.  She is complaining of left shoulder pain.  She is able to ambulate.  She has difficulty with range of motion of the shoulder.  She is here today to discuss further treatment.  Rates her pain 4 out of 10.    Past Medical History:   Diagnosis Date    Asthma     COPD (chronic obstructive pulmonary disease) (HCC)     Depression     Hyperlipidemia     Hypertension      Past Surgical History:   Procedure Laterality Date    CATARACT REMOVAL WITH IMPLANT      right eye    EYE SURGERY      HYSTERECTOMY (CERVIX STATUS UNKNOWN)        History reviewed. No pertinent family history.   Social History     Tobacco Use    Smoking status: Never    Smokeless tobacco: Never   Vaping Use    Vaping status: Never Used   Substance Use Topics    Alcohol use: No     Comment: 1 cup of coffee a day     Drug use: No        Review of Systems   Constitutional:  Positive for activity change.   HENT:  Negative for congestion.    Eyes:  Negative for discharge.   Respiratory:  Negative for shortness of breath.    Cardiovascular:  Negative for chest pain.

## 2024-10-11 ENCOUNTER — TELEPHONE (OUTPATIENT)
Dept: PALLATIVE CARE | Age: 89
End: 2024-10-11

## 2024-10-11 NOTE — TELEPHONE ENCOUNTER
Returned call to pt daughter Ashley to schedule pt for Palliative care home visit with KARON Dave. Pt scheduled to be seen in home 10/17 at 10:30am. Advised to call for any needs in the meantime.      Lizette PABON,W  Palliative Medicine

## 2024-10-17 ENCOUNTER — OFFICE VISIT (OUTPATIENT)
Dept: PALLATIVE CARE | Age: 89
End: 2024-10-17
Payer: MEDICARE

## 2024-10-17 DIAGNOSIS — S42.002A CLOSED DISPLACED FRACTURE OF LEFT CLAVICLE, UNSPECIFIED PART OF CLAVICLE, INITIAL ENCOUNTER: ICD-10-CM
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
## 2024-10-17 DIAGNOSIS — R54 AGE-RELATED PHYSICAL DEBILITY: Primary | ICD-10-CM

## 2024-10-17 PROCEDURE — G8420 CALC BMI NORM PARAMETERS: HCPCS | Performed by: NURSE PRACTITIONER

## 2024-10-17 PROCEDURE — G8484 FLU IMMUNIZE NO ADMIN: HCPCS | Performed by: NURSE PRACTITIONER

## 2024-10-17 PROCEDURE — 1090F PRES/ABSN URINE INCON ASSESS: CPT | Performed by: NURSE PRACTITIONER

## 2024-10-17 PROCEDURE — 1123F ACP DISCUSS/DSCN MKR DOCD: CPT | Performed by: NURSE PRACTITIONER

## 2024-10-17 PROCEDURE — 99350 HOME/RES VST EST HIGH MDM 60: CPT | Performed by: NURSE PRACTITIONER

## 2024-10-17 PROCEDURE — 1036F TOBACCO NON-USER: CPT | Performed by: NURSE PRACTITIONER

## 2024-10-17 RX ORDER — QUETIAPINE FUMARATE 25 MG/1
25 TABLET, FILM COATED ORAL 3 TIMES DAILY
Qty: 90 TABLET | Refills: 2 | Status: SHIPPED | OUTPATIENT
Start: 2024-10-17 | End: 2025-01-15

## 2024-10-17 NOTE — PROGRESS NOTES
Palliative Care Department  Provider: RITIKA Brandon - CNP    Referring Provider:  Dr. Lomeli    Location of Service:   The patient's home    Chief Complaint: Emilee Maciel is a 89 y.o. female with chief complaint of fatigue, pain    Assessment/Plan      Alzheimer Demetia with Behavioral Disturbance:   -  Known to Dr. Márquez   -  On Namenda   -  Seroquel 25 mg increase to TID   -  Previously on depakote but stopped due to some side effect concerns    Age related Physical Debility/Fall:   -  Encouraged activity as tolerated   -  [facility] for PT/OT   -  Recent left clavicle fracture, in sling    Depression/Decreased Appetite:   -  Mirtazapine 15 mg HS    Chronica Pain syndrome/OA vs PMR:   -  Tylenol   -  Meloxicam 15 mg daily   -  Salonpas   -  Recommend PT    Follow Up:  6 weeks.  They were encouraged to call with any questions, concerns, needs, or changes in symptoms.    Subjective:     HPI:  Emilee Maciel is a 89 y.o. female with significant medical history of senile dementia, with some report of sundowning, as well as some overall decline in physical abilities, with worse pain and achiness throughout the day, who was referred to [facility] for support.    Subjective/Events/Discussions:  Emilee is seen in her home today accompanied by her daughter and son   She is alert, fairly oriented, but is noted to have memory impairment, and some mild confusion  Her confusion has been worse recently  She has become more anxious as her memory has worsened, at time having agitation  She is taking seroquel BID, which is helpful, but she still has anxiety throughout the day and this does again at time lead to agitation  She is noted to be visible anxious on a few occasions during out encounter today  Options were discussed and increasing seroquel to TID is recommended  She has also had some falls recently, most recently last week which resulted in a left clavicle fracture and more hip pain  She has not used any

## 2024-10-23 DIAGNOSIS — S42.032A TRAUMATIC CLOSED FRACTURE OF DISTAL CLAVICLE WITH MINIMAL DISPLACEMENT, LEFT, INITIAL ENCOUNTER: Primary | ICD-10-CM

## 2024-10-24 ENCOUNTER — OFFICE VISIT (OUTPATIENT)
Dept: ORTHOPEDIC SURGERY | Age: 89
End: 2024-10-24

## 2024-10-24 VITALS — BODY MASS INDEX: 21.33 KG/M2 | HEIGHT: 65 IN | WEIGHT: 128 LBS | TEMPERATURE: 98.6 F

## 2024-10-24 DIAGNOSIS — S42.032A TRAUMATIC CLOSED FRACTURE OF DISTAL CLAVICLE WITH MINIMAL DISPLACEMENT, LEFT, INITIAL ENCOUNTER: Primary | ICD-10-CM

## 2024-10-24 PROCEDURE — 99024 POSTOP FOLLOW-UP VISIT: CPT | Performed by: ORTHOPAEDIC SURGERY

## 2024-10-24 ASSESSMENT — ENCOUNTER SYMPTOMS
ABDOMINAL DISTENTION: 0
SHORTNESS OF BREATH: 0
EYE DISCHARGE: 0
ALLERGIC/IMMUNOLOGIC NEGATIVE: 1

## 2024-10-24 NOTE — PROGRESS NOTES
Emilee Maciel (:  1929) is a 89 y.o. female,Established patient, here for evaluation of the following chief complaint(s):  Follow-up (Left shoulder f/u. Patient states she is feeling better and feels pain has improved. Having no pain today unless shoulder is moved a certain way. )      Assessment & Plan   ASSESSMENT/PLAN:  1. Traumatic closed fracture of distal clavicle with minimal displacement, left, initial encounter    Continue sling - ok for waist level movement    Return in about 4 weeks (around 2024).         Subjective   SUBJECTIVE/OBJECTIVE:  HPI    89-year-old female presents for evaluation of her left shoulder.  She sustained a left distal clavicle fracture about 2 weeks ago.  She was placed in a sling.  Minimal pain today.    Past Medical History:   Diagnosis Date    Asthma     COPD (chronic obstructive pulmonary disease) (HCC)     Depression     Hyperlipidemia     Hypertension      Past Surgical History:   Procedure Laterality Date    CATARACT REMOVAL WITH IMPLANT      right eye    EYE SURGERY      HYSTERECTOMY (CERVIX STATUS UNKNOWN)        History reviewed. No pertinent family history.   Social History     Tobacco Use    Smoking status: Never    Smokeless tobacco: Never   Vaping Use    Vaping status: Never Used   Substance Use Topics    Alcohol use: No     Comment: 1 cup of coffee a day     Drug use: No        Review of Systems   Constitutional:  Positive for activity change.   HENT:  Negative for congestion.    Eyes:  Negative for discharge.   Respiratory:  Negative for shortness of breath.    Cardiovascular:  Negative for chest pain.   Gastrointestinal:  Negative for abdominal distention.   Endocrine: Negative.    Genitourinary: Negative.    Musculoskeletal:  Positive for arthralgias and joint swelling.   Skin:  Negative for wound.   Allergic/Immunologic: Negative.    Neurological: Negative.    Hematological: Negative.    Psychiatric/Behavioral: Negative.     All other systems reviewed

## 2024-10-31 DIAGNOSIS — J41.0 SIMPLE CHRONIC BRONCHITIS (HCC): ICD-10-CM

## 2024-10-31 RX ORDER — UMECLIDINIUM BROMIDE AND VILANTEROL TRIFENATATE 62.5; 25 UG/1; UG/1
POWDER RESPIRATORY (INHALATION)
Qty: 60 EACH | Refills: 1 | Status: SHIPPED | OUTPATIENT
Start: 2024-10-31

## 2024-10-31 NOTE — TELEPHONE ENCOUNTER
Please refill :)    Last Appointment:  9/6/2024  Future Appointments   Date Time Provider Department Center   11/26/2024 10:00 AM Rodger Cox MD [facility]   12/16/2024  2:15 PM Chris Márquez MD [facility]   12/30/2024  1:00 PM Usha Lomeli MD [facility]

## 2024-11-02 ENCOUNTER — APPOINTMENT (OUTPATIENT)
Dept: CT IMAGING | Age: 89
End: 2024-11-02
Payer: MEDICARE

## 2024-11-02 ENCOUNTER — APPOINTMENT (OUTPATIENT)
Dept: GENERAL RADIOLOGY | Age: 89
End: 2024-11-02
Payer: MEDICARE

## 2024-11-02 ENCOUNTER — HOSPITAL ENCOUNTER (EMERGENCY)
Age: 89
Discharge: HOME | End: 2024-11-03
Attending: STUDENT IN AN ORGANIZED HEALTH CARE EDUCATION/TRAINING PROGRAM
Payer: MEDICARE

## 2024-11-02 DIAGNOSIS — S01.01XA LACERATION OF SCALP, INITIAL ENCOUNTER: ICD-10-CM
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
## 2024-11-02 DIAGNOSIS — S09.90XA INJURY OF HEAD, INITIAL ENCOUNTER: ICD-10-CM

## 2024-11-02 PROCEDURE — 12001 RPR S/N/AX/GEN/TRNK 2.5CM/<: CPT

## 2024-11-02 PROCEDURE — 99284 EMERGENCY DEPT VISIT MOD MDM: CPT

## 2024-11-02 PROCEDURE — 70450 CT HEAD/BRAIN W/O DYE: CPT

## 2024-11-02 PROCEDURE — 73521 X-RAY EXAM HIPS BI 2 VIEWS: CPT

## 2024-11-02 PROCEDURE — 71045 X-RAY EXAM CHEST 1 VIEW: CPT

## 2024-11-02 PROCEDURE — 72125 CT NECK SPINE W/O DYE: CPT

## 2024-11-02 ASSESSMENT — LIFESTYLE VARIABLES
HOW MANY STANDARD DRINKS CONTAINING ALCOHOL DO YOU HAVE ON A TYPICAL DAY: PATIENT DOES NOT DRINK
HOW OFTEN DO YOU HAVE A DRINK CONTAINING ALCOHOL: NEVER

## 2024-11-02 ASSESSMENT — PAIN - FUNCTIONAL ASSESSMENT: PAIN_FUNCTIONAL_ASSESSMENT: NONE - DENIES PAIN

## 2024-11-03 VITALS
SYSTOLIC BLOOD PRESSURE: 168 MMHG | DIASTOLIC BLOOD PRESSURE: 93 MMHG | HEIGHT: 65 IN | BODY MASS INDEX: 21.33 KG/M2 | WEIGHT: 128 LBS | TEMPERATURE: 97.9 F | HEART RATE: 56 BPM | OXYGEN SATURATION: 95 %

## 2024-11-03 PROCEDURE — 6370000000 HC RX 637 (ALT 250 FOR IP)

## 2024-11-03 RX ORDER — ACETAMINOPHEN 325 MG/1
650 TABLET ORAL ONCE
Status: COMPLETED | OUTPATIENT
Start: 2024-11-03 | End: 2024-11-03

## 2024-11-03 RX ADMIN — ACETAMINOPHEN 650 MG: 325 TABLET ORAL at 01:30

## 2024-11-03 ASSESSMENT — PAIN DESCRIPTION - DESCRIPTORS: DESCRIPTORS: ACHING

## 2024-11-03 ASSESSMENT — PAIN DESCRIPTION - LOCATION: LOCATION: HEAD

## 2024-11-03 ASSESSMENT — PAIN SCALES - GENERAL: PAINLEVEL_OUTOF10: 3

## 2024-11-03 NOTE — DISCHARGE INSTRUCTIONS
Please return to ED if symptoms worsen, persist, or occur.  Please follow-up with PCP.  Please take your medications as prescribed. Please remove staples in 10 days at PCP office, urgent care.    CT Head W/O Contrast   Final Result   No acute intracranial process. Left occipital scalp contusion.         CT CSpine W/O Contrast   Final Result   No acute cervical spine fracture.      Question acute nondisplaced fracture of the posterior left 2nd rib, correlate   with point tenderness.         XR CHEST 1 VIEW   Final Result   No acute process.      Hiatal hernia.         XR HIP BILATERAL W AP PELVIS (2 VIEWS)   Final Result   1. No acute fracture or dislocation.   2. Advanced degenerative disc disease at the L4-L5 level.  No signs of acute   fracture or dislocation.   3. Large volume of retained fecal debris within the colon

## 2024-11-03 NOTE — ED PROVIDER NOTES
contusion.         CT CSpine W/O Contrast   Final Result   No acute cervical spine fracture.      Question acute nondisplaced fracture of the posterior left 2nd rib, correlate   with point tenderness.         XR CHEST 1 VIEW   Final Result   No acute process.      Hiatal hernia.         XR HIP BILATERAL W AP PELVIS (2 VIEWS)   Final Result   1. No acute fracture or dislocation.   2. Advanced degenerative disc disease at the L4-L5 level.  No signs of acute   fracture or dislocation.   3. Large volume of retained fecal debris within the colon             Procedures       LACERATION REPAIR  PROCEDURE NOTE:  Unless otherwise indicated, this procedure was done or directly supervised by the ED attending.    Laceration #: 1.  Location: Posterior  Length: 2 cm.  The wound area was irrigated with sterile water and draped in a sterile fashion.  The wound area was anesthetized with not required.  WOUND COMPLEXITY:    Debridement: None.  Undermining: None.  Wound Margins Revised: None.  Flaps Aligned: no.  The wound was explored with the following results no foreign body or tendon injury seen.  The wound was closed with staples.    Total number staples: 2       MDM and ED course   History is obtained from patient, EMS, and family for patient's acute onset of moderate head injury fall    Differential diagnoses: Plain radiographs ordered to evaluate for fractures or other bony abnormalities. CT head without contrast to evaluate for hemorrhage or masses.      I interpreted the patient's imaging please see above.  No acute injury, cervical collar was cleared, no tenderness, full range of motion no neurologic complaints.  There is questionable acute nondisplaced fracture left second rib on imaging however she has no tenderness to the region.  Incentive spirometry performed, vital capacity was 2000.  2 cm laceration to posterior scalp, overlying skin was thrombosed no active hemorrhage.  2 staples placed.  Advised to have staples

## 2024-11-12 ENCOUNTER — OFFICE VISIT (OUTPATIENT)
Dept: FAMILY MEDICINE CLINIC | Age: 89
End: 2024-11-12

## 2024-11-12 VITALS
TEMPERATURE: 97.4 F | SYSTOLIC BLOOD PRESSURE: 133 MMHG | HEIGHT: 65 IN | BODY MASS INDEX: 21.33 KG/M2 | WEIGHT: 128 LBS | DIASTOLIC BLOOD PRESSURE: 83 MMHG | HEART RATE: 62 BPM | RESPIRATION RATE: 19 BRPM | OXYGEN SATURATION: 98 %

## 2024-11-12 DIAGNOSIS — Z48.02 ENCOUNTER FOR STAPLE REMOVAL: Primary | ICD-10-CM

## 2024-11-12 ASSESSMENT — PATIENT HEALTH QUESTIONNAIRE - PHQ9
9. THOUGHTS THAT YOU WOULD BE BETTER OFF DEAD, OR OF HURTING YOURSELF: NOT AT ALL
SUM OF ALL RESPONSES TO PHQ QUESTIONS 1-9: 0
SUM OF ALL RESPONSES TO PHQ QUESTIONS 1-9: 0
8. MOVING OR SPEAKING SO SLOWLY THAT OTHER PEOPLE COULD HAVE NOTICED. OR THE OPPOSITE, BEING SO FIGETY OR RESTLESS THAT YOU HAVE BEEN MOVING AROUND A LOT MORE THAN USUAL: NOT AT ALL
7. TROUBLE CONCENTRATING ON THINGS, SUCH AS READING THE NEWSPAPER OR WATCHING TELEVISION: NOT AT ALL
SUM OF ALL RESPONSES TO PHQ QUESTIONS 1-9: 0
3. TROUBLE FALLING OR STAYING ASLEEP: NOT AT ALL
6. FEELING BAD ABOUT YOURSELF - OR THAT YOU ARE A FAILURE OR HAVE LET YOURSELF OR YOUR FAMILY DOWN: NOT AT ALL
DEPRESSION UNABLE TO ASSESS: FUNCTIONAL CAPACITY MOTIVATION LIMITS ACCURACY
5. POOR APPETITE OR OVEREATING: NOT AT ALL
SUM OF ALL RESPONSES TO PHQ QUESTIONS 1-9: 0
4. FEELING TIRED OR HAVING LITTLE ENERGY: NOT AT ALL
SUM OF ALL RESPONSES TO PHQ9 QUESTIONS 1 & 2: 0
1. LITTLE INTEREST OR PLEASURE IN DOING THINGS: NOT AT ALL
2. FEELING DOWN, DEPRESSED OR HOPELESS: NOT AT ALL
10. IF YOU CHECKED OFF ANY PROBLEMS, HOW DIFFICULT HAVE THESE PROBLEMS MADE IT FOR YOU TO DO YOUR WORK, TAKE CARE OF THINGS AT HOME, OR GET ALONG WITH OTHER PEOPLE: NOT DIFFICULT AT ALL

## 2024-11-12 NOTE — PROGRESS NOTES
auscultation and breath sounds equal bilaterally.    CV: Regular rate and rhythm, normal heart sounds, without pathological murmurs, ectopy, gallops, or rubs.  Skin:  No rashes, erythema present.  Laceration site: back of head. No bleeding or drainage noted. Wound is healing well without any signs of dehiscence or secondary infection noted. 2 simple interrupted sutures remain intact. FROM back of head without deficits or weakness.  Distal sensation intact.  Cap refill less then 2 sec.  Lymphatics: No lymphangitis or adenopathy noted.  Neurological:  Alert and oriented.  Motor functions intact.      Lab / Imaging Results   (All laboratory and radiology results have been personally reviewed by myself)  Labs:  No results found for this visit on 11/12/24.    Imaging:  All Radiology results interpreted by Radiologist unless otherwise noted.      Assessment / Plan     Impression(s):  Emilee was seen today for suture / staple removal.    Diagnoses and all orders for this visit:    Encounter for staple removal      Disposition:  Disposition: Discharge to Home.    Sutures removed from affected area using a suture removal kit. Pt tolerated the procedure well. Wound was then covered with a thin layer of JOSE L and a sterile dressing. Advised continued rest of the affected area to prevent dehiscence. Wound care measures reviewed. ED immediately with signs of secondary infection including surrounding erythema, swelling, increased tenderness, or purulent discharge. ED with any weakness, paresthesias, or uncontrolled bleeding. Pt states understanding and is in agreement with this care plan. All questions answered.     RITIKA Rosa - CNP    **This report was transcribed using voice recognition software. Every effort was made to ensure accuracy; however, inadvertent computerized transcription errors may be present.

## 2024-11-20 DIAGNOSIS — S42.032A TRAUMATIC CLOSED FRACTURE OF DISTAL CLAVICLE WITH MINIMAL DISPLACEMENT, LEFT, INITIAL ENCOUNTER: Primary | ICD-10-CM

## 2024-11-25 ENCOUNTER — TELEPHONE (OUTPATIENT)
Dept: PALLATIVE CARE | Age: 88
End: 2024-11-25

## 2024-11-25 NOTE — TELEPHONE ENCOUNTER
placed call to pt daughter Ashley to schedule pt for follow up home visit with Palliative care KARON Dave. Pt scheduled to be seen on 12/5 at 11:15am. Daughter agreed to appt time and date.       Lizette PABON,LSW  Palliative Medicine

## 2024-11-26 ENCOUNTER — OFFICE VISIT (OUTPATIENT)
Dept: ORTHOPEDIC SURGERY | Age: 88
End: 2024-11-26

## 2024-11-26 VITALS
DIASTOLIC BLOOD PRESSURE: 74 MMHG | BODY MASS INDEX: 21.33 KG/M2 | HEART RATE: 66 BPM | HEIGHT: 65 IN | TEMPERATURE: 97.1 F | OXYGEN SATURATION: 97 % | SYSTOLIC BLOOD PRESSURE: 128 MMHG | WEIGHT: 128 LBS | RESPIRATION RATE: 14 BRPM

## 2024-11-26 DIAGNOSIS — S42.032A TRAUMATIC CLOSED FRACTURE OF DISTAL CLAVICLE WITH MINIMAL DISPLACEMENT, LEFT, INITIAL ENCOUNTER: Primary | ICD-10-CM

## 2024-11-26 PROCEDURE — 99024 POSTOP FOLLOW-UP VISIT: CPT | Performed by: ORTHOPAEDIC SURGERY

## 2024-11-26 ASSESSMENT — ENCOUNTER SYMPTOMS
EYE DISCHARGE: 0
SHORTNESS OF BREATH: 0
ALLERGIC/IMMUNOLOGIC NEGATIVE: 1
ABDOMINAL DISTENTION: 0

## 2024-11-26 NOTE — PROGRESS NOTES
Emilee Maciel (:  1929) is a 89 y.o. female,Established patient, here for evaluation of the following chief complaint(s):  Follow-up (F/u L shoulder, has been doing PT, PT helps)      Assessment & Plan   ASSESSMENT/PLAN:  1. Traumatic closed fracture of distal clavicle with minimal displacement, left, initial encounter    Wean out of sling  Ok to progress ROM as tolerated    No follow-ups on file.         Subjective   SUBJECTIVE/OBJECTIVE:  HPI    89-year-old female presents for evaluation of her left shoulder.  She sustained a left distal clavicle fracture about 6 weeks ago.  She was placed in a sling.  Minimal pain today. Work with home PT/hospice.    Past Medical History:   Diagnosis Date    Asthma     COPD (chronic obstructive pulmonary disease) (HCC)     Depression     Hyperlipidemia     Hypertension      Past Surgical History:   Procedure Laterality Date    CATARACT REMOVAL WITH IMPLANT      right eye    EYE SURGERY      HYSTERECTOMY (CERVIX STATUS UNKNOWN)        History reviewed. No pertinent family history.   Social History     Tobacco Use    Smoking status: Never    Smokeless tobacco: Never   Vaping Use    Vaping status: Never Used   Substance Use Topics    Alcohol use: No     Comment: 1 cup of coffee a day     Drug use: No        Review of Systems   Constitutional:  Positive for activity change.   HENT:  Negative for congestion.    Eyes:  Negative for discharge.   Respiratory:  Negative for shortness of breath.    Cardiovascular:  Negative for chest pain.   Gastrointestinal:  Negative for abdominal distention.   Endocrine: Negative.    Genitourinary: Negative.    Musculoskeletal:  Positive for arthralgias and joint swelling.   Skin:  Negative for wound.   Allergic/Immunologic: Negative.    Neurological: Negative.    Hematological: Negative.    Psychiatric/Behavioral: Negative.     All other systems reviewed and are negative.         Objective   Physical Exam  Constitutional:       Appearance:

## 2024-12-05 ENCOUNTER — OFFICE VISIT (OUTPATIENT)
Dept: PALLATIVE CARE | Age: 88
End: 2024-12-05
Payer: MEDICARE

## 2024-12-05 DIAGNOSIS — Z51.5 PALLIATIVE CARE BY SPECIALIST: ICD-10-CM

## 2024-12-05 DIAGNOSIS — R54 AGE-RELATED PHYSICAL DEBILITY: Primary | ICD-10-CM

## 2024-12-05 DIAGNOSIS — F03.918 DEMENTIA WITH BEHAVIORAL DISTURBANCE (HCC): ICD-10-CM

## 2024-12-05 PROCEDURE — 1036F TOBACCO NON-USER: CPT | Performed by: NURSE PRACTITIONER

## 2024-12-05 PROCEDURE — G8420 CALC BMI NORM PARAMETERS: HCPCS | Performed by: NURSE PRACTITIONER

## 2024-12-05 PROCEDURE — 99349 HOME/RES VST EST MOD MDM 40: CPT | Performed by: NURSE PRACTITIONER

## 2024-12-05 PROCEDURE — 1090F PRES/ABSN URINE INCON ASSESS: CPT | Performed by: NURSE PRACTITIONER

## 2024-12-05 PROCEDURE — 1123F ACP DISCUSS/DSCN MKR DOCD: CPT | Performed by: NURSE PRACTITIONER

## 2024-12-05 PROCEDURE — G8484 FLU IMMUNIZE NO ADMIN: HCPCS | Performed by: NURSE PRACTITIONER

## 2024-12-05 NOTE — PROGRESS NOTES
Palliative Care Department  Provider: RITIKA Brandon - CNP    Referring Provider:  Dr. Lomeli    Location of Service:   The patient's home    Chief Complaint: Emilee Maciel is a 89 y.o. female with chief complaint of fatigue, pain    Assessment/Plan      Alzheimer Demetia with Behavioral Disturbance:   -  Known to Dr. Márquez   -  On Namenda   -  Seroquel 25 mg morning and HS, 50 mg midday   -  Previously on depakote but stopped due to some side effect concerns    Age related Physical Debility/Fall:   -  Encouraged activity as tolerated   -  [facility] for PT/OT   -  Still high risk for fall, last fall was first week of Nov.   -  Doing well with PT    Depression/Decreased Appetite:   -  Mirtazapine 15 mg HS    Chronica Pain syndrome/OA vs PMR:   -  Tylenol   -  Meloxicam 15 mg daily   -  Salonpas   -  Recommend PT    Follow Up:  8 weeks.  They were encouraged to call with any questions, concerns, needs, or changes in symptoms.    Subjective:     HPI:  Emilee Maciel is a 89 y.o. female with significant medical history of senile dementia, with some report of sundowning, as well as some overall decline in physical abilities, with worse pain and achiness throughout the day, who was referred to [facility] Palliative Medicine for support.    Subjective/Events/Discussions:  Emilee is seen in her home today accompanied by her daughter and caregiver  She is doing fairly well  She did have a fall a month ago, but has not had any more since then  She is having some improvement in the early evening and sleeping better, but still has significant anxiety during evenings still  She is also restless through the night waking frequently often to urinate  Options discussed and the goals is to try and reduce her evening anxiety  Will increase afternoon seroquel, as adding midday dose did seem beneficial and will try and optimize  She has follow up in approx. A week with Dr. Márquez as well  No other concerns    Past Medical History:   Diagnosis

## 2024-12-16 ENCOUNTER — OFFICE VISIT (OUTPATIENT)
Dept: GERIATRIC MEDICINE | Age: 88
End: 2024-12-16
Payer: MEDICARE

## 2024-12-16 VITALS
WEIGHT: 128 LBS | SYSTOLIC BLOOD PRESSURE: 108 MMHG | BODY MASS INDEX: 21.3 KG/M2 | RESPIRATION RATE: 20 BRPM | HEART RATE: 72 BPM | TEMPERATURE: 97.5 F | OXYGEN SATURATION: 94 % | DIASTOLIC BLOOD PRESSURE: 78 MMHG

## 2024-12-16 DIAGNOSIS — G30.1 MODERATE LATE ONSET ALZHEIMER'S DEMENTIA WITH AGITATION (HCC): Primary | ICD-10-CM

## 2024-12-16 DIAGNOSIS — F02.B11 MODERATE LATE ONSET ALZHEIMER'S DEMENTIA WITH AGITATION (HCC): Primary | ICD-10-CM

## 2024-12-16 PROCEDURE — 1036F TOBACCO NON-USER: CPT | Performed by: INTERNAL MEDICINE

## 2024-12-16 PROCEDURE — 1123F ACP DISCUSS/DSCN MKR DOCD: CPT | Performed by: INTERNAL MEDICINE

## 2024-12-16 PROCEDURE — G8427 DOCREV CUR MEDS BY ELIG CLIN: HCPCS | Performed by: INTERNAL MEDICINE

## 2024-12-16 PROCEDURE — 1159F MED LIST DOCD IN RCRD: CPT | Performed by: INTERNAL MEDICINE

## 2024-12-16 PROCEDURE — G8420 CALC BMI NORM PARAMETERS: HCPCS | Performed by: INTERNAL MEDICINE

## 2024-12-16 PROCEDURE — 1090F PRES/ABSN URINE INCON ASSESS: CPT | Performed by: INTERNAL MEDICINE

## 2024-12-16 PROCEDURE — 99213 OFFICE O/P EST LOW 20 MIN: CPT | Performed by: INTERNAL MEDICINE

## 2024-12-16 PROCEDURE — G8484 FLU IMMUNIZE NO ADMIN: HCPCS | Performed by: INTERNAL MEDICINE

## 2024-12-16 RX ORDER — MEMANTINE HYDROCHLORIDE 10 MG/1
10 TABLET ORAL 2 TIMES DAILY
Qty: 180 TABLET | Refills: 3 | Status: SHIPPED | OUTPATIENT
Start: 2024-12-16

## 2024-12-16 RX ORDER — DONEPEZIL HYDROCHLORIDE 5 MG/1
5 TABLET, FILM COATED ORAL
Qty: 90 TABLET | Refills: 3 | Status: SHIPPED | OUTPATIENT
Start: 2024-12-16

## 2024-12-16 RX ORDER — QUETIAPINE FUMARATE 25 MG/1
25 TABLET, FILM COATED ORAL
Qty: 360 TABLET | Refills: 3 | Status: SHIPPED | OUTPATIENT
Start: 2024-12-16

## 2024-12-16 NOTE — PROGRESS NOTES
CC Recheck dementia     Here with son who looks like his dad  Now with 24/7 care   And he lives at same home  Reports of the issues appreciated  Mobile but unstable  NO RIGIDITY OR TREMOR  GETS DIARRHEA FROM EXTRA ARICEPT   Requires Seroquel 25 mg qid  Family seems happy with the results of med  Minicog low but does know son most of the time   Dead have come back to life   ADL NEARLY INDEPENDENT AND IADL's PER FAMILY  WANTS TO GO HOME AT NIGHT  Thinks  is still alive   Has about 6 aids that come to house   Impression: Advancing SDAT with behavior issues on Seroquel  Plan: Aricept 5 mg plus Namenda 20 mg a day           SEROQUEL 100 MG A DAY

## 2024-12-16 NOTE — PROGRESS NOTES
Chief Complaint   Patient presents with    6 Month Follow-Up     June follow up.  Here with son, Syed.  Per son, pt sees Palliative Care who increased the Seroquel to 4 tablets per day -- 1 in the morning, 2 in the afternoon, and 1 at 8 pm.       Difficulty Walking     Pt is unsteady while walking.  Per son, she has been this way for a while, but sometimes is better than other times.  Currently is more unsteady.  Son reports that pt has a walker & a wheelchair -- none here today with her.  Gets home PT.    Fall     Son states pt has fallen x 2 in the past month -- is unsteady when she gets up out of the chair & loses her balance.  Broke her collar bone after a fall 3 months ago.      Flu Vaccine     Son is unsure if pt has gotten her flu vaccine this season.  He will check with his sister & take pt to the pharmacy for vaccine if still needed.     Urinary Frequency at Night     Increased need to use the toilet at night -- between 3 & 6 times to urinate at night, per son.       Dr Márquez notified of above.

## 2024-12-18 NOTE — TELEPHONE ENCOUNTER
Name of Medication(s) Requested:  Requested Prescriptions     Pending Prescriptions Disp Refills    omeprazole (PRILOSEC) 20 MG delayed release capsule [Pharmacy Med Name: OMEPRAZOLE 20MG CAPSULES] 90 capsule 3     Sig: TAKE 1 CAPSULE BY MOUTH DAILY       Medication is on current medication list Yes and No    Dosage and directions were verified? No    Quantity verified: 30 day supply     Pharmacy Verified?  No    Last Appointment:  9/6/2024    Future appts:  Future Appointments   Date Time Provider Department Center   12/30/2024  1:00 PM Usha Lomeli MD [facility]   6/18/2025  1:45 PM Chris Márquez MD [facility]        (If no appt send self scheduling link. .REFILLAPPT)  Scheduling request sent?     [] Yes  [x] No    Does patient need updated?  [] Yes  [x] No

## 2024-12-26 SDOH — HEALTH STABILITY: PHYSICAL HEALTH: ON AVERAGE, HOW MANY DAYS PER WEEK DO YOU ENGAGE IN MODERATE TO STRENUOUS EXERCISE (LIKE A BRISK WALK)?: 0 DAYS

## 2024-12-26 ASSESSMENT — PATIENT HEALTH QUESTIONNAIRE - PHQ9
3. TROUBLE FALLING OR STAYING ASLEEP: NEARLY EVERY DAY
2. FEELING DOWN, DEPRESSED OR HOPELESS: MORE THAN HALF THE DAYS
SUM OF ALL RESPONSES TO PHQ QUESTIONS 1-9: 18
5. POOR APPETITE OR OVEREATING: NEARLY EVERY DAY
10. IF YOU CHECKED OFF ANY PROBLEMS, HOW DIFFICULT HAVE THESE PROBLEMS MADE IT FOR YOU TO DO YOUR WORK, TAKE CARE OF THINGS AT HOME, OR GET ALONG WITH OTHER PEOPLE: NOT DIFFICULT AT ALL
8. MOVING OR SPEAKING SO SLOWLY THAT OTHER PEOPLE COULD HAVE NOTICED. OR THE OPPOSITE, BEING SO FIGETY OR RESTLESS THAT YOU HAVE BEEN MOVING AROUND A LOT MORE THAN USUAL: SEVERAL DAYS
9. THOUGHTS THAT YOU WOULD BE BETTER OFF DEAD, OR OF HURTING YOURSELF: NOT AT ALL
SUM OF ALL RESPONSES TO PHQ QUESTIONS 1-9: 18
SUM OF ALL RESPONSES TO PHQ9 QUESTIONS 1 & 2: 5
4. FEELING TIRED OR HAVING LITTLE ENERGY: NEARLY EVERY DAY
6. FEELING BAD ABOUT YOURSELF - OR THAT YOU ARE A FAILURE OR HAVE LET YOURSELF OR YOUR FAMILY DOWN: NOT AT ALL
1. LITTLE INTEREST OR PLEASURE IN DOING THINGS: NEARLY EVERY DAY
7. TROUBLE CONCENTRATING ON THINGS, SUCH AS READING THE NEWSPAPER OR WATCHING TELEVISION: NEARLY EVERY DAY

## 2024-12-26 ASSESSMENT — LIFESTYLE VARIABLES
HOW OFTEN DO YOU HAVE A DRINK CONTAINING ALCOHOL: 1
HOW OFTEN DO YOU HAVE A DRINK CONTAINING ALCOHOL: NEVER
HOW MANY STANDARD DRINKS CONTAINING ALCOHOL DO YOU HAVE ON A TYPICAL DAY: PATIENT DOES NOT DRINK
HOW OFTEN DO YOU HAVE SIX OR MORE DRINKS ON ONE OCCASION: 1
HOW MANY STANDARD DRINKS CONTAINING ALCOHOL DO YOU HAVE ON A TYPICAL DAY: 0

## 2024-12-30 ENCOUNTER — OFFICE VISIT (OUTPATIENT)
Dept: FAMILY MEDICINE CLINIC | Age: 88
End: 2024-12-30
Payer: MEDICARE

## 2024-12-30 VITALS
HEIGHT: 65 IN | WEIGHT: 126 LBS | HEART RATE: 60 BPM | DIASTOLIC BLOOD PRESSURE: 71 MMHG | OXYGEN SATURATION: 93 % | TEMPERATURE: 97.5 F | SYSTOLIC BLOOD PRESSURE: 153 MMHG | BODY MASS INDEX: 20.99 KG/M2 | RESPIRATION RATE: 18 BRPM

## 2024-12-30 DIAGNOSIS — F02.B11 MODERATE LATE ONSET ALZHEIMER'S DEMENTIA WITH AGITATION (HCC): ICD-10-CM

## 2024-12-30 DIAGNOSIS — R26.81 GAIT INSTABILITY: ICD-10-CM

## 2024-12-30 DIAGNOSIS — G30.9 ALZHEIMER'S DISEASE, UNSPECIFIED (CODE) (HCC): ICD-10-CM

## 2024-12-30 DIAGNOSIS — G30.1 MODERATE LATE ONSET ALZHEIMER'S DEMENTIA WITH AGITATION (HCC): ICD-10-CM

## 2024-12-30 DIAGNOSIS — Z00.00 MEDICARE ANNUAL WELLNESS VISIT, SUBSEQUENT: Primary | ICD-10-CM

## 2024-12-30 PROCEDURE — 1160F RVW MEDS BY RX/DR IN RCRD: CPT | Performed by: FAMILY MEDICINE

## 2024-12-30 PROCEDURE — G8484 FLU IMMUNIZE NO ADMIN: HCPCS | Performed by: FAMILY MEDICINE

## 2024-12-30 PROCEDURE — 1123F ACP DISCUSS/DSCN MKR DOCD: CPT | Performed by: FAMILY MEDICINE

## 2024-12-30 PROCEDURE — 1159F MED LIST DOCD IN RCRD: CPT | Performed by: FAMILY MEDICINE

## 2024-12-30 PROCEDURE — G0439 PPPS, SUBSEQ VISIT: HCPCS | Performed by: FAMILY MEDICINE

## 2024-12-30 RX ORDER — OMEPRAZOLE 40 MG/1
40 CAPSULE, DELAYED RELEASE ORAL DAILY
Qty: 30 CAPSULE | Refills: 1 | Status: SHIPPED | OUTPATIENT
Start: 2024-12-30

## 2024-12-30 RX ORDER — MELOXICAM 15 MG/1
15 TABLET ORAL EVERY MORNING
Qty: 90 TABLET | Refills: 1 | Status: SHIPPED | OUTPATIENT
Start: 2024-12-30

## 2024-12-30 NOTE — TELEPHONE ENCOUNTER
Name of Medication(s) Requested:  Requested Prescriptions     Pending Prescriptions Disp Refills    meloxicam (MOBIC) 15 MG tablet [Pharmacy Med Name: MELOXICAM 15MG TABLETS] 90 tablet 1     Sig: TAKE 1 TABLET BY MOUTH EVERY MORNING       Medication is on current medication list Yes    Dosage and directions were verified? Yes    Quantity verified: 90 day supply     Pharmacy Verified?  Yes    Last Appointment:  9/6/2024    Future appts:  Future Appointments   Date Time Provider Department Center   6/18/2025  1:45 PM Chris Márquez MD [facility]        (If no appt send self scheduling link. .REFILLAPPT)  Scheduling request sent?     [x] Yes  [] No    Does patient need updated?  [] Yes  [] No

## 2024-12-30 NOTE — PROGRESS NOTES
(PROVENTIL) (2.5 MG/3ML) 0.083% nebulizer solution Take 3 mLs by nebulization 4 times daily as needed for Wheezing  Usha Lomeli MD   vitamin C (ASCORBIC ACID) 500 MG tablet Take 1 tablet by mouth 2 times daily  Sheryl Anand MD   LORATADINE PO Take 5 mg by mouth daily   Sheryl Anand MD   vitamin B-6 (PYRIDOXINE) 50 MG tablet Take 1 tablet by mouth daily  ProviderSheryl MD   Cyanocobalamin (VITAMIN B 12 PO) Take 1,000 mcg by mouth daily   Sheryl Anand MD   vitamin D (CHOLECALCIFEROL) 1000 UNIT TABS tablet Take 2 tablets by mouth daily  ProviderSheryl MD       CareTeam (Including outside providers/suppliers regularly involved in providing care):   Patient Care Team:  Usha Lomeli MD as PCP - General (Family Medicine)  U[facility] L[facility]eli [facility] PCP [facility]  Dustin Lamar MD as Consulting Physician (Cardiology)      Reviewed and updated this visit:  Tobacco  Allergies  Meds  Problems  Med Hx  Surg Hx  Soc Hx  Fam Hx

## 2024-12-30 NOTE — PATIENT INSTRUCTIONS

## 2025-01-01 DIAGNOSIS — M54.9 MID BACK PAIN: ICD-10-CM

## 2025-01-01 RX ORDER — ACETAMINOPHEN AND CODEINE PHOSPHATE 300; 30 MG/1; MG/1
TABLET ORAL
Qty: 30 TABLET | Status: CANCELLED | OUTPATIENT
Start: 2025-01-01

## 2025-01-13 ENCOUNTER — TELEPHONE (OUTPATIENT)
Dept: FAMILY MEDICINE CLINIC | Age: 89
End: 2025-01-13

## 2025-01-13 DIAGNOSIS — Z11.1 SCREENING FOR TUBERCULOSIS: Primary | ICD-10-CM

## 2025-01-13 NOTE — TELEPHONE ENCOUNTER
Advised to to stop aricept and will follow up in 1-2 weeks.   If no improved or minimal improvement will start weaning namenda.     Patient's daughter Ashley agreeable with plan.

## 2025-01-15 DIAGNOSIS — J41.0 SIMPLE CHRONIC BRONCHITIS (HCC): ICD-10-CM

## 2025-01-15 RX ORDER — UMECLIDINIUM BROMIDE AND VILANTEROL TRIFENATATE 62.5; 25 UG/1; UG/1
POWDER RESPIRATORY (INHALATION)
Qty: 60 EACH | Refills: 1 | Status: SHIPPED | OUTPATIENT
Start: 2025-01-15

## 2025-01-15 NOTE — TELEPHONE ENCOUNTER
Name of Medication(s) Requested:  Requested Prescriptions     Pending Prescriptions Disp Refills    ANORO ELLIPTA 62.5-25 MCG/ACT inhaler [Pharmacy Med Name: ANORO ELLIPTA 62.5-25 ORAL INH(30S)] 60 each 1     Sig: INHALE 1 PUFF BY MOUTH DAILY       Medication is on current medication list Yes    Dosage and directions were verified? Yes    Quantity verified: 60  day supply     Pharmacy Verified?  Yes    Last Appointment:  12/30/2024    Future appts:  Future Appointments   Date Time Provider Department Center   6/18/2025  1:45 PM Chris Márquez MD [facility]   1/5/2026  1:00 PM [facility]eli MD [facility]        (If no appt send self scheduling link. .REFILLAPPT)  Scheduling request sent?     [] Yes  [x] No    Does patient need updated?  [] Yes  [x] No

## 2025-01-30 ENCOUNTER — HOSPITAL ENCOUNTER (EMERGENCY)
Age: 89
Discharge: ANOTHER HOSPITAL | End: 2025-01-30
Attending: STUDENT IN AN ORGANIZED HEALTH CARE EDUCATION/TRAINING PROGRAM
Payer: MEDICARE

## 2025-01-30 ENCOUNTER — HOSPITAL ENCOUNTER (INPATIENT)
Age: 89
LOS: 2 days | Discharge: OTHER INSTITUTION | DRG: 964 | End: 2025-02-01
Attending: EMERGENCY MEDICINE | Admitting: FAMILY MEDICINE
Payer: MEDICARE

## 2025-01-30 ENCOUNTER — APPOINTMENT (OUTPATIENT)
Dept: GENERAL RADIOLOGY | Age: 89
DRG: 964 | End: 2025-01-30
Payer: MEDICARE

## 2025-01-30 ENCOUNTER — APPOINTMENT (OUTPATIENT)
Dept: GENERAL RADIOLOGY | Age: 89
End: 2025-01-30
Payer: MEDICARE

## 2025-01-30 ENCOUNTER — APPOINTMENT (OUTPATIENT)
Dept: CT IMAGING | Age: 89
End: 2025-01-30
Payer: MEDICARE

## 2025-01-30 ENCOUNTER — APPOINTMENT (OUTPATIENT)
Dept: CT IMAGING | Age: 89
DRG: 964 | End: 2025-01-30
Payer: MEDICARE

## 2025-01-30 ENCOUNTER — APPOINTMENT (OUTPATIENT)
Dept: ULTRASOUND IMAGING | Age: 89
DRG: 964 | End: 2025-01-30
Payer: MEDICARE

## 2025-01-30 VITALS
DIASTOLIC BLOOD PRESSURE: 100 MMHG | WEIGHT: 125 LBS | RESPIRATION RATE: 18 BRPM | HEART RATE: 105 BPM | BODY MASS INDEX: 20.8 KG/M2 | TEMPERATURE: 98.4 F | OXYGEN SATURATION: 98 % | SYSTOLIC BLOOD PRESSURE: 135 MMHG
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
## 2025-01-30 DIAGNOSIS — I60.9 SUBARACHNOID BLEED (HCC): ICD-10-CM

## 2025-01-30 DIAGNOSIS — S72.142A CLOSED DISPLACED INTERTROCHANTERIC FRACTURE OF LEFT FEMUR, INITIAL ENCOUNTER (HCC): ICD-10-CM

## 2025-01-30 DIAGNOSIS — S72.142A CLOSED DISPLACED INTERTROCHANTERIC FRACTURE OF LEFT FEMUR, INITIAL ENCOUNTER (HCC): Primary | ICD-10-CM

## 2025-01-30 DIAGNOSIS — I60.9 SUBARACHNOID HEMORRHAGE (HCC): ICD-10-CM

## 2025-01-30 DIAGNOSIS — R79.89 ELEVATED TROPONIN: ICD-10-CM

## 2025-01-30 PROBLEM — S61.310A LACERATION OF RIGHT INDEX FINGER WITHOUT FOREIGN BODY WITH DAMAGE TO NAIL: Status: ACTIVE | Noted: 2025-01-30

## 2025-01-30 PROBLEM — S72.002A CLOSED LEFT HIP FRACTURE, INITIAL ENCOUNTER (HCC): Status: ACTIVE | Noted: 2025-01-30

## 2025-01-30 LAB
ALBUMIN SERPL-MCNC: 4.2 G/DL (ref 3.5–5.2)
ALP SERPL-CCNC: 83 U/L (ref 35–104)
ALT SERPL-CCNC: 11 U/L (ref 0–32)
ANION GAP SERPL CALCULATED.3IONS-SCNC: 9 MMOL/L (ref 7–16)
AST SERPL-CCNC: 16 U/L (ref 0–31)
BASOPHILS # BLD: 0.03 K/UL (ref 0–0.2)
BASOPHILS NFR BLD: 0 % (ref 0–2)
BILIRUB SERPL-MCNC: 0.4 MG/DL (ref 0–1.2)
BUN SERPL-MCNC: 7 MG/DL (ref 6–23)
CALCIUM SERPL-MCNC: 9.3 MG/DL (ref 8.6–10.2)
CHLORIDE SERPL-SCNC: 101 MMOL/L (ref 98–107)
CLOT ANGLE.KAOLIN INDUCED BLD RES TEG: 72.7 DEG (ref 53–70)
CO2 SERPL-SCNC: 27 MMOL/L (ref 22–29)
CREAT SERPL-MCNC: 0.6 MG/DL (ref 0.5–1)
EKG ATRIAL RATE: 65 BPM
EKG P AXIS: 58 DEGREES
EKG P-R INTERVAL: 104 MS
EKG Q-T INTERVAL: 420 MS
EKG QRS DURATION: 66 MS
EKG QTC CALCULATION (BAZETT): 436 MS
EKG R AXIS: 69 DEGREES
EKG T AXIS: 69 DEGREES
EKG VENTRICULAR RATE: 65 BPM
EOSINOPHIL # BLD: 0.04 K/UL (ref 0.05–0.5)
EOSINOPHILS RELATIVE PERCENT: 0 % (ref 0–6)
EPL-TEG: 0.9 % (ref 0–15)
ERYTHROCYTE [DISTWIDTH] IN BLOOD BY AUTOMATED COUNT: 12.9 % (ref 11.5–15)
G-TEG: 10 KDYN/CM2 (ref 4.5–11)
GFR, ESTIMATED: 83 ML/MIN/1.73M2
GLUCOSE SERPL-MCNC: 120 MG/DL (ref 74–99)
HCT VFR BLD AUTO: 42.3 % (ref 34–48)
HGB BLD-MCNC: 14 G/DL (ref 11.5–15.5)
IMM GRANULOCYTES # BLD AUTO: 0.08 K/UL (ref 0–0.58)
IMM GRANULOCYTES NFR BLD: 1 % (ref 0–5)
INR PPP: 1.2
KINETICS TEG: 1.2 MIN (ref 1–3)
LY30 (LYSIS) TEG: 0.9 % (ref 0–8)
LYMPHOCYTES NFR BLD: 0.81 K/UL (ref 1.5–4)
LYMPHOCYTES RELATIVE PERCENT: 7 % (ref 20–42)
MA (MAX CLOT) TEG: 66.7 MM (ref 50–70)
MCH RBC QN AUTO: 29.2 PG (ref 26–35)
MCHC RBC AUTO-ENTMCNC: 33.1 G/DL (ref 32–34.5)
MCV RBC AUTO: 88.3 FL (ref 80–99.9)
MONOCYTES NFR BLD: 0.35 K/UL (ref 0.1–0.95)
MONOCYTES NFR BLD: 3 % (ref 2–12)
NEUTROPHILS NFR BLD: 89 % (ref 43–80)
NEUTS SEG NFR BLD: 10.86 K/UL (ref 1.8–7.3)
PARTIAL THROMBOPLASTIN TIME: 35.8 SEC (ref 24.5–35.1)
PLATELET # BLD AUTO: 187 K/UL (ref 130–450)
PMV BLD AUTO: 10.4 FL (ref 7–12)
POTASSIUM SERPL-SCNC: 4.3 MMOL/L (ref 3.5–5)
PROT SERPL-MCNC: 6.6 G/DL (ref 6.4–8.3)
PROTHROMBIN TIME: 12.6 SEC (ref 9.3–12.4)
RBC # BLD AUTO: 4.79 M/UL (ref 3.5–5.5)
REACTION TIME TEG: 4.2 MIN (ref 5–10)
SODIUM SERPL-SCNC: 137 MMOL/L (ref 132–146)
TROPONIN I SERPL HS-MCNC: 58 NG/L (ref 0–9)
WBC OTHER # BLD: 12.2 K/UL (ref 4.5–11.5)

## 2025-01-30 PROCEDURE — 6360000002 HC RX W HCPCS

## 2025-01-30 PROCEDURE — 96376 TX/PRO/DX INJ SAME DRUG ADON: CPT

## 2025-01-30 PROCEDURE — 80053 COMPREHEN METABOLIC PANEL: CPT

## 2025-01-30 PROCEDURE — 85347 COAGULATION TIME ACTIVATED: CPT

## 2025-01-30 PROCEDURE — 93010 ELECTROCARDIOGRAM REPORT: CPT | Performed by: INTERNAL MEDICINE

## 2025-01-30 PROCEDURE — 93971 EXTREMITY STUDY: CPT

## 2025-01-30 PROCEDURE — 99285 EMERGENCY DEPT VISIT HI MDM: CPT

## 2025-01-30 PROCEDURE — 73502 X-RAY EXAM HIP UNI 2-3 VIEWS: CPT

## 2025-01-30 PROCEDURE — 70450 CT HEAD/BRAIN W/O DYE: CPT

## 2025-01-30 PROCEDURE — 96374 THER/PROPH/DIAG INJ IV PUSH: CPT

## 2025-01-30 PROCEDURE — 12002 RPR S/N/AX/GEN/TRNK2.6-7.5CM: CPT

## 2025-01-30 PROCEDURE — 85610 PROTHROMBIN TIME: CPT

## 2025-01-30 PROCEDURE — 85576 BLOOD PLATELET AGGREGATION: CPT

## 2025-01-30 PROCEDURE — 84484 ASSAY OF TROPONIN QUANT: CPT

## 2025-01-30 PROCEDURE — 73552 X-RAY EXAM OF FEMUR 2/>: CPT

## 2025-01-30 PROCEDURE — 73610 X-RAY EXAM OF ANKLE: CPT

## 2025-01-30 PROCEDURE — 85384 FIBRINOGEN ACTIVITY: CPT

## 2025-01-30 PROCEDURE — 72125 CT NECK SPINE W/O DYE: CPT

## 2025-01-30 PROCEDURE — 1200000000 HC SEMI PRIVATE

## 2025-01-30 PROCEDURE — 85730 THROMBOPLASTIN TIME PARTIAL: CPT

## 2025-01-30 PROCEDURE — 6360000002 HC RX W HCPCS: Performed by: STUDENT IN AN ORGANIZED HEALTH CARE EDUCATION/TRAINING PROGRAM

## 2025-01-30 PROCEDURE — 93005 ELECTROCARDIOGRAM TRACING: CPT | Performed by: STUDENT IN AN ORGANIZED HEALTH CARE EDUCATION/TRAINING PROGRAM

## 2025-01-30 PROCEDURE — 85025 COMPLETE CBC W/AUTO DIFF WBC: CPT

## 2025-01-30 PROCEDURE — 85390 FIBRINOLYSINS SCREEN I&R: CPT

## 2025-01-30 PROCEDURE — 90471 IMMUNIZATION ADMIN: CPT

## 2025-01-30 PROCEDURE — 6370000000 HC RX 637 (ALT 250 FOR IP)

## 2025-01-30 PROCEDURE — 90714 TD VACC NO PRESV 7 YRS+ IM: CPT

## 2025-01-30 PROCEDURE — 2500000003 HC RX 250 WO HCPCS

## 2025-01-30 RX ORDER — PANTOPRAZOLE SODIUM 40 MG/1
40 TABLET, DELAYED RELEASE ORAL
Status: DISCONTINUED | OUTPATIENT
Start: 2025-01-31 | End: 2025-02-01 | Stop reason: HOSPADM

## 2025-01-30 RX ORDER — SODIUM CHLORIDE 0.9 % (FLUSH) 0.9 %
5-40 SYRINGE (ML) INJECTION PRN
Status: DISCONTINUED | OUTPATIENT
Start: 2025-01-30 | End: 2025-02-01 | Stop reason: HOSPADM

## 2025-01-30 RX ORDER — LORAZEPAM 2 MG/ML
1 INJECTION INTRAMUSCULAR EVERY 6 HOURS PRN
Status: DISCONTINUED | OUTPATIENT
Start: 2025-01-30 | End: 2025-01-31

## 2025-01-30 RX ORDER — QUETIAPINE FUMARATE 25 MG/1
25 TABLET, FILM COATED ORAL
Status: DISCONTINUED | OUTPATIENT
Start: 2025-01-30 | End: 2025-02-01 | Stop reason: HOSPADM

## 2025-01-30 RX ORDER — ACETAMINOPHEN 325 MG/1
650 TABLET ORAL EVERY 6 HOURS SCHEDULED
Status: DISCONTINUED | OUTPATIENT
Start: 2025-01-31 | End: 2025-02-01 | Stop reason: HOSPADM

## 2025-01-30 RX ORDER — SODIUM CHLORIDE 9 MG/ML
INJECTION, SOLUTION INTRAVENOUS PRN
Status: DISCONTINUED | OUTPATIENT
Start: 2025-01-30 | End: 2025-02-01 | Stop reason: HOSPADM

## 2025-01-30 RX ORDER — LIDOCAINE HYDROCHLORIDE AND EPINEPHRINE 10; 10 MG/ML; UG/ML
INJECTION, SOLUTION INFILTRATION; PERINEURAL
Status: DISCONTINUED
Start: 2025-01-30 | End: 2025-01-30 | Stop reason: WASHOUT

## 2025-01-30 RX ORDER — ACETAMINOPHEN 650 MG/1
650 SUPPOSITORY RECTAL EVERY 6 HOURS PRN
Status: DISCONTINUED | OUTPATIENT
Start: 2025-01-30 | End: 2025-01-30

## 2025-01-30 RX ORDER — SERTRALINE HYDROCHLORIDE 100 MG/1
100 TABLET, FILM COATED ORAL DAILY
Status: DISCONTINUED | OUTPATIENT
Start: 2025-01-30 | End: 2025-02-01 | Stop reason: HOSPADM

## 2025-01-30 RX ORDER — MEMANTINE HYDROCHLORIDE 10 MG/1
10 TABLET ORAL 2 TIMES DAILY
Status: DISCONTINUED | OUTPATIENT
Start: 2025-01-30 | End: 2025-02-01 | Stop reason: HOSPADM

## 2025-01-30 RX ORDER — SODIUM CHLORIDE 0.9 % (FLUSH) 0.9 %
5-40 SYRINGE (ML) INJECTION EVERY 12 HOURS SCHEDULED
Status: DISCONTINUED | OUTPATIENT
Start: 2025-01-30 | End: 2025-02-01 | Stop reason: HOSPADM

## 2025-01-30 RX ORDER — DONEPEZIL HYDROCHLORIDE 5 MG/1
5 TABLET, FILM COATED ORAL
Status: DISCONTINUED | OUTPATIENT
Start: 2025-01-31 | End: 2025-02-01 | Stop reason: HOSPADM

## 2025-01-30 RX ORDER — FENTANYL CITRATE 0.05 MG/ML
25 INJECTION, SOLUTION INTRAMUSCULAR; INTRAVENOUS ONCE
Status: COMPLETED | OUTPATIENT
Start: 2025-01-30 | End: 2025-01-30

## 2025-01-30 RX ORDER — LIDOCAINE HYDROCHLORIDE AND EPINEPHRINE 10; 10 MG/ML; UG/ML
20 INJECTION, SOLUTION INFILTRATION; PERINEURAL ONCE
Status: DISCONTINUED | OUTPATIENT
Start: 2025-01-30 | End: 2025-01-30

## 2025-01-30 RX ORDER — ONDANSETRON 4 MG/1
4 TABLET, ORALLY DISINTEGRATING ORAL EVERY 8 HOURS PRN
Status: DISCONTINUED | OUTPATIENT
Start: 2025-01-30 | End: 2025-02-01 | Stop reason: HOSPADM

## 2025-01-30 RX ORDER — LORAZEPAM 2 MG/ML
INJECTION INTRAMUSCULAR
Status: COMPLETED
Start: 2025-01-30 | End: 2025-01-30

## 2025-01-30 RX ORDER — ACETAMINOPHEN 325 MG/1
650 TABLET ORAL EVERY 6 HOURS PRN
Status: DISCONTINUED | OUTPATIENT
Start: 2025-01-30 | End: 2025-01-30

## 2025-01-30 RX ORDER — ONDANSETRON 2 MG/ML
4 INJECTION INTRAMUSCULAR; INTRAVENOUS EVERY 6 HOURS PRN
Status: DISCONTINUED | OUTPATIENT
Start: 2025-01-30 | End: 2025-02-01 | Stop reason: HOSPADM

## 2025-01-30 RX ORDER — KETOROLAC TROMETHAMINE 30 MG/ML
15 INJECTION, SOLUTION INTRAMUSCULAR; INTRAVENOUS ONCE
Status: COMPLETED | OUTPATIENT
Start: 2025-01-30 | End: 2025-01-30

## 2025-01-30 RX ORDER — LORAZEPAM 2 MG/ML
1 INJECTION INTRAMUSCULAR ONCE
Status: COMPLETED | OUTPATIENT
Start: 2025-01-30 | End: 2025-01-30

## 2025-01-30 RX ORDER — LIDOCAINE HYDROCHLORIDE 10 MG/ML
INJECTION, SOLUTION INFILTRATION; PERINEURAL
Status: COMPLETED
Start: 2025-01-30 | End: 2025-01-30

## 2025-01-30 RX ORDER — POLYETHYLENE GLYCOL 3350 17 G/17G
17 POWDER, FOR SOLUTION ORAL DAILY PRN
Status: DISCONTINUED | OUTPATIENT
Start: 2025-01-30 | End: 2025-02-01 | Stop reason: HOSPADM

## 2025-01-30 RX ORDER — ASCORBIC ACID 500 MG
500 TABLET ORAL 2 TIMES DAILY
Status: DISCONTINUED | OUTPATIENT
Start: 2025-01-30 | End: 2025-02-01 | Stop reason: HOSPADM

## 2025-01-30 RX ORDER — MIRTAZAPINE 15 MG/1
15 TABLET, FILM COATED ORAL NIGHTLY
Status: DISCONTINUED | OUTPATIENT
Start: 2025-01-30 | End: 2025-02-01 | Stop reason: HOSPADM

## 2025-01-30 RX ORDER — LEVETIRACETAM 500 MG/5ML
500 INJECTION, SOLUTION, CONCENTRATE INTRAVENOUS EVERY 12 HOURS
Status: DISCONTINUED | OUTPATIENT
Start: 2025-01-30 | End: 2025-01-31

## 2025-01-30 RX ADMIN — QUETIAPINE FUMARATE 25 MG: 25 TABLET ORAL at 20:28

## 2025-01-30 RX ADMIN — FENTANYL CITRATE 25 MCG: 0.05 INJECTION, SOLUTION INTRAMUSCULAR; INTRAVENOUS at 11:25

## 2025-01-30 RX ADMIN — LORAZEPAM 1 MG: 2 INJECTION INTRAMUSCULAR; INTRAVENOUS at 20:12

## 2025-01-30 RX ADMIN — CLOSTRIDIUM TETANI TOXOID ANTIGEN (FORMALDEHYDE INACTIVATED) AND CORYNEBACTERIUM DIPHTHERIAE TOXOID ANTIGEN (FORMALDEHYDE INACTIVATED) 0.5 ML: 5; 2 INJECTION, SUSPENSION INTRAMUSCULAR at 20:59

## 2025-01-30 RX ADMIN — Medication 500 MG: at 20:15

## 2025-01-30 RX ADMIN — KETOROLAC TROMETHAMINE 15 MG: 30 INJECTION, SOLUTION INTRAMUSCULAR at 20:12

## 2025-01-30 RX ADMIN — LEVETIRACETAM 500 MG: 100 INJECTION INTRAVENOUS at 18:00

## 2025-01-30 RX ADMIN — MEMANTINE 10 MG: 10 TABLET ORAL at 20:28

## 2025-01-30 RX ADMIN — MIRTAZAPINE 15 MG: 15 TABLET, FILM COATED ORAL at 20:28

## 2025-01-30 RX ADMIN — LIDOCAINE HYDROCHLORIDE 200 MG: 10 INJECTION, SOLUTION INFILTRATION; PERINEURAL at 18:14

## 2025-01-30 RX ADMIN — SODIUM CHLORIDE, PRESERVATIVE FREE 10 ML: 5 INJECTION INTRAVENOUS at 20:28

## 2025-01-30 RX ADMIN — FENTANYL CITRATE 25 MCG: 0.05 INJECTION, SOLUTION INTRAMUSCULAR; INTRAVENOUS at 15:35

## 2025-01-30 RX ADMIN — LORAZEPAM 1 MG: 2 INJECTION INTRAMUSCULAR at 20:12

## 2025-01-30 ASSESSMENT — PAIN DESCRIPTION - DESCRIPTORS
DESCRIPTORS: SORE
DESCRIPTORS: ACHING

## 2025-01-30 ASSESSMENT — PAIN SCALES - GENERAL
PAINLEVEL_OUTOF10: 10
PAINLEVEL_OUTOF10: 5
PAINLEVEL_OUTOF10: 10
PAINLEVEL_OUTOF10: 4

## 2025-01-30 ASSESSMENT — PAIN DESCRIPTION - LOCATION
LOCATION: HIP
LOCATION: HIP
LOCATION: FINGER (COMMENT WHICH ONE)
LOCATION: HIP
LOCATION: HIP

## 2025-01-30 ASSESSMENT — LIFESTYLE VARIABLES
HOW OFTEN DO YOU HAVE A DRINK CONTAINING ALCOHOL: NEVER
HOW MANY STANDARD DRINKS CONTAINING ALCOHOL DO YOU HAVE ON A TYPICAL DAY: PATIENT DOES NOT DRINK
HOW MANY STANDARD DRINKS CONTAINING ALCOHOL DO YOU HAVE ON A TYPICAL DAY: PATIENT DOES NOT DRINK
HOW OFTEN DO YOU HAVE A DRINK CONTAINING ALCOHOL: NEVER

## 2025-01-30 ASSESSMENT — PAIN - FUNCTIONAL ASSESSMENT
PAIN_FUNCTIONAL_ASSESSMENT: PREVENTS OR INTERFERES SOME ACTIVE ACTIVITIES AND ADLS
PAIN_FUNCTIONAL_ASSESSMENT: PREVENTS OR INTERFERES WITH ALL ACTIVE AND SOME PASSIVE ACTIVITIES
PAIN_FUNCTIONAL_ASSESSMENT: 0-10

## 2025-01-30 ASSESSMENT — PAIN DESCRIPTION - ORIENTATION
ORIENTATION: LEFT
ORIENTATION: LEFT
ORIENTATION: RIGHT

## 2025-01-30 NOTE — ED PROVIDER NOTES
surgery.    Social History:  reports that she has never smoked. She has never used smokeless tobacco. She reports that she does not drink alcohol and does not use drugs.    Family History: family history is not on file.     The patient’s home medications have been reviewed.    Allergies: Demerol hcl [meperidine] and Statins    -------------------------------------------------- RESULTS -------------------------------------------------    Lab  Results for orders placed or performed during the hospital encounter of 01/30/25   CBC with Auto Differential   Result Value Ref Range    WBC 12.2 (H) 4.5 - 11.5 k/uL    RBC 4.79 3.50 - 5.50 m/uL    Hemoglobin 14.0 11.5 - 15.5 g/dL    Hematocrit 42.3 34.0 - 48.0 %    MCV 88.3 80.0 - 99.9 fL    MCH 29.2 26.0 - 35.0 pg    MCHC 33.1 32.0 - 34.5 g/dL    RDW 12.9 11.5 - 15.0 %    Platelets 187 130 - 450 k/uL    MPV 10.4 7.0 - 12.0 fL    Neutrophils % 89 (H) 43.0 - 80.0 %    Lymphocytes % 7 (L) 20.0 - 42.0 %    Monocytes % 3 2.0 - 12.0 %    Eosinophils % 0 0 - 6 %    Basophils % 0 0.0 - 2.0 %    Immature Granulocytes % 1 0.0 - 5.0 %    Neutrophils Absolute 10.86 (H) 1.80 - 7.30 k/uL    Lymphocytes Absolute 0.81 (L) 1.50 - 4.00 k/uL    Monocytes Absolute 0.35 0.10 - 0.95 k/uL    Eosinophils Absolute 0.04 (L) 0.05 - 0.50 k/uL    Basophils Absolute 0.03 0.00 - 0.20 k/uL    Immature Granulocytes Absolute 0.08 0.00 - 0.58 k/uL   CMP   Result Value Ref Range    Sodium 137 132 - 146 mmol/L    Potassium 4.3 3.5 - 5.0 mmol/L    Chloride 101 98 - 107 mmol/L    CO2 27 22 - 29 mmol/L    Anion Gap 9 7 - 16 mmol/L    Glucose 120 (H) 74 - 99 mg/dL    BUN 7 6 - 23 mg/dL    Creatinine 0.6 0.50 - 1.00 mg/dL    Est, Glom Filt Rate 83 >60 mL/min/1.73m2    Calcium 9.3 8.6 - 10.2 mg/dL    Total Protein 6.6 6.4 - 8.3 g/dL    Albumin 4.2 3.5 - 5.2 g/dL    Total Bilirubin 0.4 0.0 - 1.2 mg/dL    Alkaline Phosphatase 83 35 - 104 U/L    ALT 11 0 - 32 U/L    AST 16 0 - 31 U/L   Protime-INR   Result Value Ref

## 2025-01-30 NOTE — ED TRIAGE NOTES
Transfer from [facility],  patient fell this am from nursing home.  Fell in bathroom.  Left hip fracture. Small brain bleed.

## 2025-01-30 NOTE — ED PROVIDER NOTES
[facility] EMERGENCY DEPARTMENT  EMERGENCY DEPARTMENT ENCOUNTER        Pt Name: Emilee Maciel  MRN: [id number]  Birthdate 7/20/1929  Date of evaluation: 1/30/2025  Provider: Ibrahima Adams II, DO  PCP: Usha Lomeli MD  Note Started: 5:23 PM EST 1/30/25    CHIEF COMPLAINT       Chief Complaint   Patient presents with    Fall       HISTORY OF PRESENT ILLNESS: 1 or more Elements            Emilee Maciel is a 89 y.o. female history of dementia, brought in by EMS from [facility] for a evaluation.  Patient had a mechanical fall earlier this morning.  Patient landed on the left side of her hip.  Patient did not lose consciousness.  Patient was found to have a small subarachnoid hemorrhage and a left hip fracture.  Per family patient is at her baseline mentation.  Patient complaining of pain in her right ankle.  Patient did have right ankle imaging which showed no acute bony injury.  Patient is on anticoagulated.    Nursing Notes were all reviewed and agreed with or any disagreements were addressed in the HPI.      REVIEW OF EXTERNAL NOTE :       Records reviewed for medical hx, surgical, hx, and medication lists      Chart Review/External Note Review    Last Echo reviewed by Me:  No results found for: \"LVEF\", \"LVEFMODE\"          Controlled Substance Monitoring:    Acute and Chronic Pain Monitoring:   RX Monitoring Periodic Controlled Substance Monitoring   2/7/2024   5:14 PM No signs of potential drug abuse or diversion identified.           REVIEW OF SYSTEMS :      Positives and Pertinent negatives as per HPI.     SURGICAL HISTORY     Past Surgical History:   Procedure Laterality Date    CATARACT REMOVAL WITH IMPLANT      right eye    EYE SURGERY      HYSTERECTOMY (CERVIX STATUS UNKNOWN)         CURRENTMEDICATIONS       Previous Medications    ALBUTEROL (PROVENTIL) (2.5 MG/3ML) 0.083% NEBULIZER SOLUTION    Take 3 mLs by nebulization 4 times daily as needed for Wheezing

## 2025-01-31 ENCOUNTER — APPOINTMENT (OUTPATIENT)
Age: 89
DRG: 964 | End: 2025-01-31
Payer: MEDICARE

## 2025-01-31 ENCOUNTER — APPOINTMENT (OUTPATIENT)
Dept: GENERAL RADIOLOGY | Age: 89
DRG: 964 | End: 2025-01-31
Payer: MEDICARE

## 2025-01-31 ENCOUNTER — PATIENT MESSAGE (OUTPATIENT)
Dept: FAMILY MEDICINE CLINIC | Age: 89
End: 2025-01-31

## 2025-01-31 ENCOUNTER — ANESTHESIA EVENT (OUTPATIENT)
Dept: OPERATING ROOM | Age: 89
DRG: 964 | End: 2025-01-31
Payer: MEDICARE

## 2025-01-31 ENCOUNTER — APPOINTMENT (OUTPATIENT)
Dept: CT IMAGING | Age: 89
DRG: 964 | End: 2025-01-31
Payer: MEDICARE

## 2025-01-31 ENCOUNTER — ANESTHESIA (OUTPATIENT)
Dept: OPERATING ROOM | Age: 89
DRG: 964 | End: 2025-01-31
Payer: MEDICARE

## 2025-01-31 PROBLEM — S72.142A CLOSED DISPLACED INTERTROCHANTERIC FRACTURE OF LEFT FEMUR (HCC): Status: ACTIVE | Noted: 2025-01-31

## 2025-01-31 PROBLEM — Z01.810 PREOP CARDIOVASCULAR EXAM: Status: ACTIVE | Noted: 2025-01-31

## 2025-01-31 PROBLEM — R79.89 ELEVATED TROPONIN: Status: ACTIVE | Noted: 2025-01-31

## 2025-01-31 PROBLEM — I27.20 PULMONARY HYPERTENSION (HCC): Status: ACTIVE | Noted: 2025-01-31

## 2025-01-31 LAB
ANION GAP SERPL CALCULATED.3IONS-SCNC: 13 MMOL/L (ref 7–16)
BACTERIA URNS QL MICRO: ABNORMAL
BASOPHILS # BLD: 0.02 K/UL (ref 0–0.2)
BASOPHILS NFR BLD: 0 % (ref 0–2)
BILIRUB UR QL STRIP: NEGATIVE
BUN SERPL-MCNC: 19 MG/DL (ref 6–23)
CALCIUM SERPL-MCNC: 9.1 MG/DL (ref 8.6–10.2)
CHLORIDE SERPL-SCNC: 99 MMOL/L (ref 98–107)
CK SERPL-CCNC: 86 U/L (ref 20–180)
CLARITY UR: CLEAR
CO2 SERPL-SCNC: 24 MMOL/L (ref 22–29)
COLOR UR: YELLOW
CREAT SERPL-MCNC: 0.8 MG/DL (ref 0.5–1)
ECHO AV AREA PEAK VELOCITY: 2.6 CM2
ECHO AV AREA VTI: 3.6 CM2
ECHO AV AREA/BSA PEAK VELOCITY: 1.6 CM2/M2
ECHO AV AREA/BSA VTI: 2.2 CM2/M2
ECHO AV MEAN GRADIENT: 10 MMHG
ECHO AV MEAN VELOCITY: 1.4 M/S
ECHO AV PEAK GRADIENT: 20 MMHG
ECHO AV PEAK VELOCITY: 2.3 M/S
ECHO AV VELOCITY RATIO: 0.83
ECHO AV VTI: 24.4 CM
ECHO BSA: 1.61 M2
ECHO EST RA PRESSURE: 3 MMHG
ECHO LA DIAMETER INDEX: 0.86 CM/M2
ECHO LA DIAMETER: 1.4 CM
ECHO LA VOL A-L A2C: 32 ML (ref 22–52)
ECHO LA VOL A-L A4C: 26 ML (ref 22–52)
ECHO LA VOL MOD A2C: 31 ML (ref 22–52)
ECHO LA VOL MOD A4C: 24 ML (ref 22–52)
ECHO LA VOLUME AREA LENGTH: 32 ML
ECHO LA VOLUME INDEX A-L A2C: 20 ML/M2 (ref 16–34)
ECHO LA VOLUME INDEX A-L A4C: 16 ML/M2 (ref 16–34)
ECHO LA VOLUME INDEX AREA LENGTH: 20 ML/M2 (ref 16–34)
ECHO LA VOLUME INDEX MOD A2C: 19 ML/M2 (ref 16–34)
ECHO LA VOLUME INDEX MOD A4C: 15 ML/M2 (ref 16–34)
ECHO LV EF PHYSICIAN: 60 %
ECHO LV FRACTIONAL SHORTENING: 46 % (ref 28–44)
ECHO LV INTERNAL DIMENSION DIASTOLE INDEX: 1.48 CM/M2
ECHO LV INTERNAL DIMENSION DIASTOLIC: 2.4 CM (ref 3.9–5.3)
ECHO LV INTERNAL DIMENSION SYSTOLIC INDEX: 0.8 CM/M2
ECHO LV INTERNAL DIMENSION SYSTOLIC: 1.3 CM
ECHO LV IVSD: 1.3 CM (ref 0.6–0.9)
ECHO LV MASS 2D: 64.9 G (ref 67–162)
ECHO LV MASS INDEX 2D: 40.1 G/M2 (ref 43–95)
ECHO LV POSTERIOR WALL DIASTOLIC: 0.8 CM (ref 0.6–0.9)
ECHO LV RELATIVE WALL THICKNESS RATIO: 0.67
ECHO LVOT AREA: 3.1 CM2
ECHO LVOT AV VTI INDEX: 1.18
ECHO LVOT DIAM: 2 CM
ECHO LVOT MEAN GRADIENT: 9 MMHG
ECHO LVOT PEAK GRADIENT: 15 MMHG
ECHO LVOT PEAK VELOCITY: 1.9 M/S
ECHO LVOT STROKE VOLUME INDEX: 55.8 ML/M2
ECHO LVOT SV: 90.4 ML
ECHO LVOT VTI: 28.8 CM
ECHO MV A VELOCITY: 1.57 M/S
ECHO MV AREA PHT: 4.9 CM2
ECHO MV AREA VTI: 2.5 CM2
ECHO MV E DECELERATION TIME (DT): 277.8 MS
ECHO MV E VELOCITY: 0.98 M/S
ECHO MV E/A RATIO: 0.62
ECHO MV LVOT VTI INDEX: 1.24
ECHO MV MAX VELOCITY: 1.3 M/S
ECHO MV MEAN GRADIENT: 2 MMHG
ECHO MV MEAN VELOCITY: 0.7 M/S
ECHO MV PEAK GRADIENT: 7 MMHG
ECHO MV PRESSURE HALF TIME (PHT): 45.1 MS
ECHO MV VTI: 35.7 CM
ECHO PULMONARY ARTERY SYSTOLIC PRESSURE (PASP): 40 MMHG
ECHO RIGHT VENTRICULAR SYSTOLIC PRESSURE (RVSP): 40 MMHG
ECHO TV REGURGITANT MAX VELOCITY: 3.06 M/S
ECHO TV REGURGITANT PEAK GRADIENT: 37 MMHG
EKG ATRIAL RATE: 105 BPM
EKG P AXIS: 84 DEGREES
EKG P-R INTERVAL: 128 MS
EKG Q-T INTERVAL: 336 MS
EKG QRS DURATION: 88 MS
EKG QTC CALCULATION (BAZETT): 444 MS
EKG R AXIS: 84 DEGREES
EKG T AXIS: 69 DEGREES
EKG VENTRICULAR RATE: 105 BPM
EOSINOPHIL # BLD: 0 K/UL (ref 0.05–0.5)
EOSINOPHILS RELATIVE PERCENT: 0 % (ref 0–6)
EPI CELLS #/AREA URNS HPF: ABNORMAL /HPF
ERYTHROCYTE [DISTWIDTH] IN BLOOD BY AUTOMATED COUNT: 13.2 % (ref 11.5–15)
GFR, ESTIMATED: 68 ML/MIN/1.73M2
GLUCOSE BLD-MCNC: 165 MG/DL (ref 74–99)
GLUCOSE BLD-MCNC: 181 MG/DL (ref 74–99)
GLUCOSE SERPL-MCNC: 165 MG/DL (ref 74–99)
GLUCOSE UR STRIP-MCNC: NEGATIVE MG/DL
HCT VFR BLD AUTO: 35.3 % (ref 34–48)
HGB BLD-MCNC: 11.7 G/DL (ref 11.5–15.5)
HGB UR QL STRIP.AUTO: ABNORMAL
IMM GRANULOCYTES # BLD AUTO: 0.11 K/UL (ref 0–0.58)
IMM GRANULOCYTES NFR BLD: 1 % (ref 0–5)
KETONES UR STRIP-MCNC: NEGATIVE MG/DL
LEUKOCYTE ESTERASE UR QL STRIP: NEGATIVE
LYMPHOCYTES NFR BLD: 1.5 K/UL (ref 1.5–4)
LYMPHOCYTES RELATIVE PERCENT: 11 % (ref 20–42)
MCH RBC QN AUTO: 29.5 PG (ref 26–35)
MCHC RBC AUTO-ENTMCNC: 33.1 G/DL (ref 32–34.5)
MCV RBC AUTO: 88.9 FL (ref 80–99.9)
MONOCYTES NFR BLD: 1.16 K/UL (ref 0.1–0.95)
MONOCYTES NFR BLD: 8 % (ref 2–12)
NEUTROPHILS NFR BLD: 80 % (ref 43–80)
NEUTS SEG NFR BLD: 11.38 K/UL (ref 1.8–7.3)
NITRITE UR QL STRIP: NEGATIVE
PH UR STRIP: 6 [PH] (ref 5–8)
PLATELET # BLD AUTO: 246 K/UL (ref 130–450)
PMV BLD AUTO: 11 FL (ref 7–12)
POTASSIUM SERPL-SCNC: 4.3 MMOL/L (ref 3.5–5)
PROT UR STRIP-MCNC: NEGATIVE MG/DL
RBC # BLD AUTO: 3.97 M/UL (ref 3.5–5.5)
RBC #/AREA URNS HPF: ABNORMAL /HPF
SODIUM SERPL-SCNC: 136 MMOL/L (ref 132–146)
SP GR UR STRIP: >1.03 (ref 1–1.03)
TROPONIN I SERPL HS-MCNC: 86 NG/L (ref 0–9)
TROPONIN I SERPL HS-MCNC: 96 NG/L (ref 0–9)
UROBILINOGEN UR STRIP-ACNC: 0.2 EU/DL (ref 0–1)
WBC #/AREA URNS HPF: ABNORMAL /HPF
WBC OTHER # BLD: 14.2 K/UL (ref 4.5–11.5)

## 2025-01-31 PROCEDURE — 7100000001 HC PACU RECOVERY - ADDTL 15 MIN: Performed by: ORTHOPAEDIC SURGERY

## 2025-01-31 PROCEDURE — 93306 TTE W/DOPPLER COMPLETE: CPT

## 2025-01-31 PROCEDURE — 2580000003 HC RX 258: Performed by: FAMILY MEDICINE

## 2025-01-31 PROCEDURE — 6360000002 HC RX W HCPCS

## 2025-01-31 PROCEDURE — 6360000002 HC RX W HCPCS: Performed by: FAMILY MEDICINE

## 2025-01-31 PROCEDURE — 99223 1ST HOSP IP/OBS HIGH 75: CPT | Performed by: INTERNAL MEDICINE

## 2025-01-31 PROCEDURE — 94640 AIRWAY INHALATION TREATMENT: CPT

## 2025-01-31 PROCEDURE — 71045 X-RAY EXAM CHEST 1 VIEW: CPT

## 2025-01-31 PROCEDURE — 6360000004 HC RX CONTRAST MEDICATION: Performed by: RADIOLOGY

## 2025-01-31 PROCEDURE — 2500000003 HC RX 250 WO HCPCS

## 2025-01-31 PROCEDURE — 99222 1ST HOSP IP/OBS MODERATE 55: CPT | Performed by: FAMILY MEDICINE

## 2025-01-31 PROCEDURE — 2500000003 HC RX 250 WO HCPCS: Performed by: INTERNAL MEDICINE

## 2025-01-31 PROCEDURE — 2709999900 HC NON-CHARGEABLE SUPPLY: Performed by: ORTHOPAEDIC SURGERY

## 2025-01-31 PROCEDURE — 85025 COMPLETE CBC W/AUTO DIFF WBC: CPT

## 2025-01-31 PROCEDURE — APPSS180 APP SPLIT SHARED TIME > 60 MINUTES: Performed by: NURSE PRACTITIONER

## 2025-01-31 PROCEDURE — 2700000000 HC OXYGEN THERAPY PER DAY

## 2025-01-31 PROCEDURE — 82550 ASSAY OF CK (CPK): CPT

## 2025-01-31 PROCEDURE — 36415 COLL VENOUS BLD VENIPUNCTURE: CPT

## 2025-01-31 PROCEDURE — 93306 TTE W/DOPPLER COMPLETE: CPT | Performed by: INTERNAL MEDICINE

## 2025-01-31 PROCEDURE — 0SSBX4Z REPOSITION LEFT HIP JOINT WITH INTERNAL FIXATION DEVICE, EXTERNAL APPROACH: ICD-10-PCS | Performed by: ORTHOPAEDIC SURGERY

## 2025-01-31 PROCEDURE — 71275 CT ANGIOGRAPHY CHEST: CPT

## 2025-01-31 PROCEDURE — 81001 URINALYSIS AUTO W/SCOPE: CPT

## 2025-01-31 PROCEDURE — 1200000000 HC SEMI PRIVATE

## 2025-01-31 PROCEDURE — 7100000000 HC PACU RECOVERY - FIRST 15 MIN: Performed by: ORTHOPAEDIC SURGERY

## 2025-01-31 PROCEDURE — 82962 GLUCOSE BLOOD TEST: CPT

## 2025-01-31 PROCEDURE — 80048 BASIC METABOLIC PNL TOTAL CA: CPT

## 2025-01-31 PROCEDURE — 93005 ELECTROCARDIOGRAM TRACING: CPT

## 2025-01-31 PROCEDURE — 84484 ASSAY OF TROPONIN QUANT: CPT

## 2025-01-31 RX ORDER — ALBUTEROL SULFATE 0.83 MG/ML
2.5 SOLUTION RESPIRATORY (INHALATION) ONCE
Status: COMPLETED | OUTPATIENT
Start: 2025-01-31 | End: 2025-01-31

## 2025-01-31 RX ORDER — MORPHINE SULFATE 4 MG/ML
4 INJECTION, SOLUTION INTRAMUSCULAR; INTRAVENOUS
Status: DISCONTINUED | OUTPATIENT
Start: 2025-01-31 | End: 2025-02-01 | Stop reason: HOSPADM

## 2025-01-31 RX ORDER — GLUCAGON 1 MG/ML
1 KIT INJECTION PRN
Status: DISCONTINUED | OUTPATIENT
Start: 2025-01-31 | End: 2025-02-01 | Stop reason: HOSPADM

## 2025-01-31 RX ORDER — FENTANYL CITRATE 50 UG/ML
25 INJECTION, SOLUTION INTRAMUSCULAR; INTRAVENOUS ONCE
Status: DISCONTINUED | OUTPATIENT
Start: 2025-01-31 | End: 2025-02-01 | Stop reason: HOSPADM

## 2025-01-31 RX ORDER — ALBUTEROL SULFATE 0.83 MG/ML
2.5 SOLUTION RESPIRATORY (INHALATION) EVERY 6 HOURS PRN
Status: DISCONTINUED | OUTPATIENT
Start: 2025-01-31 | End: 2025-02-01 | Stop reason: HOSPADM

## 2025-01-31 RX ORDER — IOPAMIDOL 755 MG/ML
60 INJECTION, SOLUTION INTRAVASCULAR
Status: COMPLETED | OUTPATIENT
Start: 2025-01-31 | End: 2025-01-31

## 2025-01-31 RX ORDER — MORPHINE SULFATE 2 MG/ML
2 INJECTION, SOLUTION INTRAMUSCULAR; INTRAVENOUS
Status: DISCONTINUED | OUTPATIENT
Start: 2025-01-31 | End: 2025-02-01 | Stop reason: HOSPADM

## 2025-01-31 RX ORDER — LORAZEPAM 2 MG/ML
0.5 INJECTION INTRAMUSCULAR EVERY 6 HOURS PRN
Status: DISCONTINUED | OUTPATIENT
Start: 2025-01-31 | End: 2025-01-31

## 2025-01-31 RX ORDER — SODIUM CHLORIDE 450 MG/100ML
INJECTION, SOLUTION INTRAVENOUS CONTINUOUS
Status: DISCONTINUED | OUTPATIENT
Start: 2025-01-31 | End: 2025-02-01 | Stop reason: HOSPADM

## 2025-01-31 RX ORDER — METOPROLOL TARTRATE 1 MG/ML
5 INJECTION, SOLUTION INTRAVENOUS EVERY 8 HOURS
Status: DISCONTINUED | OUTPATIENT
Start: 2025-01-31 | End: 2025-02-01 | Stop reason: HOSPADM

## 2025-01-31 RX ORDER — DEXTROSE MONOHYDRATE 100 MG/ML
INJECTION, SOLUTION INTRAVENOUS CONTINUOUS PRN
Status: DISCONTINUED | OUTPATIENT
Start: 2025-01-31 | End: 2025-02-01 | Stop reason: HOSPADM

## 2025-01-31 RX ADMIN — SODIUM CHLORIDE, PRESERVATIVE FREE 10 ML: 5 INJECTION INTRAVENOUS at 21:11

## 2025-01-31 RX ADMIN — MORPHINE SULFATE 2 MG: 2 INJECTION, SOLUTION INTRAMUSCULAR; INTRAVENOUS at 23:58

## 2025-01-31 RX ADMIN — SODIUM CHLORIDE: 0.45 INJECTION, SOLUTION INTRAVENOUS at 16:55

## 2025-01-31 RX ADMIN — ALBUTEROL SULFATE 2.5 MG: 2.5 SOLUTION RESPIRATORY (INHALATION) at 12:36

## 2025-01-31 RX ADMIN — LEVETIRACETAM 500 MG: 100 INJECTION INTRAVENOUS at 06:45

## 2025-01-31 RX ADMIN — LORAZEPAM 0.5 MG: 2 INJECTION INTRAMUSCULAR; INTRAVENOUS at 16:36

## 2025-01-31 RX ADMIN — METOROPROLOL TARTRATE 5 MG: 5 INJECTION, SOLUTION INTRAVENOUS at 23:58

## 2025-01-31 RX ADMIN — IOPAMIDOL 60 ML: 755 INJECTION, SOLUTION INTRAVENOUS at 11:43

## 2025-01-31 RX ADMIN — ONDANSETRON 4 MG: 2 INJECTION, SOLUTION INTRAMUSCULAR; INTRAVENOUS at 16:36

## 2025-01-31 ASSESSMENT — PAIN - FUNCTIONAL ASSESSMENT: PAIN_FUNCTIONAL_ASSESSMENT: ADULT NONVERBAL PAIN SCALE (NPVS)

## 2025-01-31 ASSESSMENT — PAIN SCALES - PAIN ASSESSMENT IN ADVANCED DEMENTIA (PAINAD)
FACIALEXPRESSION: SMILING OR INEXPRESSIVE
BODYLANGUAGE: RELAXED
BREATHING: NORMAL
CONSOLABILITY: NO NEED TO CONSOLE
TOTALSCORE: 0

## 2025-01-31 ASSESSMENT — PAIN SCALES - GENERAL: PAINLEVEL_OUTOF10: 0

## 2025-01-31 NOTE — CONSULTS
INPATIENT CARDIOLOGY CONSULT     Reason for Consult:  Elevated troponins, cardiac risk assessment prior to an ORIF    Cardiologist: Dr. Barajas    Requesting Physician: Dr. Lomeli    Date of Consultation: 1/31/2025    HISTORY OF PRESENT ILLNESS:   Emilee Maciel is a 89-year-old frail  female who is previously known to Dr. Lamar (in OP 2020) and previously known to [facility] (~2018).  Patient no longer follows with cardiology in the outpatient setting.  See past medical history below.    Please note the following information was obtained from patient's daughter and son who are currently at the bedside due to the patient is drowsy appearing and unable to answer questions.    Family at the bedside reported that Ms. Maciel lives alone but has around-the-clock care with 5 nursing aides and her children stay with her overnight.  Patient has a history of dementia but uses a walker for ambulation.  She reportedly has had worsening right hip pain and has been undergoing injections with pain management.  Patient's family stated that she is \"like popcorn\" and is constantly attempting to get up to walk around.  She reportedly was walking to the bathroom and tripped and fell onto her left side striking her head on the ground.  Her family was with her and EMS was summoned.  Patient's family does not recall any events that she complained of chest pain or shortness of breath.  She occasionally feels feel her heart racing but has been rare. She presented to [facility] for further management on 1/30/2025.     Upon arrival to the ED: Blood pressure 100/70, heart rate 99, afebrile, 96% on 2 L nasal cannula. Labs: Sodium 137, potassium 4.3, BUN 7, creatinine 0.6, high-sensitivity troponin 58, 96, 86.  WBC 12.>>  14.2 H/H stable, platelet count 187.    Interim history  Patient was found to have a subarachnoid hemorrhage and a closed, left intertrochanteric hip fracture. See full diagnostic testing below. She was transferred to [facility] ED for  TROPHS 58 01/30/2025 11:11 AM    TROPHS 12 06/06/2023 01:37 PM    TROPHS 8 10/26/2021 03:16 AM     CK:  Lab Results   Component Value Date/Time    CKTOTAL 35 06/06/2023 01:37 PM     LIVER PROFILE:  Recent Labs     01/30/25  1111   AST 16   ALT 11     Chest x-ray: 1/31/2025:  1.  Patchy infiltrate versus mild scarring within the left lower lung zone.  2.  Cardiomegaly and large hiatal hernia, unchanged.  3.  No CHF identified.    CTA pulmonary with contrast: 1/31/2025:  1.  No evidence of acute pulmonary emboli.  2.  Large hiatal hernia, unchanged.  3.  Consolidation in the posterolateral left lung base favoring atelectasis  though an area of pneumonia is not excluded.  Lungs are hyperinflated with  some emphysematous change and additional scattered areas of lung scarring.  4.  7 mm nodule in the posterior right lower lobe which may have a tiny  amount of associated calcification and is unchanged from the study of 2021.  Fleischner Society Guidelines (MacMahon, et al. Radiology 2017;  284(1):228-43) suggest no follow-up is necessary for patients with a low or  high risk of malignancy.  5.  Superior endplate compression fracture forming of T5 without evidence of  any subluxation or retropulsion.  This is of indeterminate age but is new  since the study of 2021.  6.  Otherwise no acute pathology or significant change noted.     ASSESSMENT:  Elevated troponin in the setting of a mechanical fall; most likely demand ischemia.  Troponins trending down and no acute EKG changes.  Patient has had no recent chest pain.  Mechanical fall status post closed left intertrochanteric hip fracture with plans for ORIF  Subarachnoid hemorrhage: Neurosurgery consulted  Leukocytosis   History of VT  History of SVT  History of mild CAD on cath 11/2016 [facility]  7 mm nodule in the posterior right lower lobe  Large hiatal hernia  Superior endplate compression fracture forming of T5 without evidence of any subluxation or retropulsion.

## 2025-01-31 NOTE — PROGRESS NOTES
Patient was cleared by cardiology and neurology.  Plan for a left hip ORIF with Dr. Carbone tomorrow.  Plan to be n.p.o. at midnight tonight.

## 2025-01-31 NOTE — H&P
[facility]  Family Medicine Residency Program  History and Physical    Patient:  Emilee Maciel 89 y.o. female MRN: [id number]     Date of Service: 1/30/2025    Hospital Day: 1      Chief complaint: had concerns including Fall.    History of Present Illness   The patient is a 89 y.o. female with PMHx of dementia who presents with LLE pain. History was obtained from patient and family.  Son is Syed ELIZABETH.  Daughter is Ashley.  She is coming from home.  Patient lives with daughter Ashley, son travels from [place] every 2 weeks to take care of mother.    Prior to admission:  Patient was getting up to use the bathroom when she slipped and fall on her left side.  Since then, patient was brought to [facility] ED.    Patient at baseline uses a walker to ambulate, works with PT once a week at home.  Has been getting worse with balance in the last few months.  Does have 5 different aids that come to the house to help her.  At night, patient aided by her daughter.  Every 2 weeks son travels from [place] to take turns.  Mentation wise, usually can recognize people, make jokes, converse well.  At night, does seem to repeat the phrase \"let me have my freedom\", lately with hitting as well    ED Course:   CMP WNL  WBC 12.2  EKG NSR with PVC, read as inferior leads ST elevation but in my opinion not elevated  CT head without contrast initially showed small amount of subarachnoid hemorrhage at the sulci of anterior left frontal, CT C-spine with no acute abnormality  X-ray hip and femur show left trochanteric fracture  X-ray ankle did not show any acute abnormality    ED Meds:   Patient was given fentanyl for pain.    Patient was admitted for closed fracture of left hip fracture.  She is a DNR CCA.    While examined in the ED, patient became agitated and she states that her hip is painful however she is not at the hospital.  She has been asking for doctors and nurses to be seen.  Patient was given IM Toradol and IV    AST 16   ALT 11   BILITOT 0.4   ALKPHOS 83       PT/INR:   Recent Labs     01/30/25  1111   PROTIME 12.6*   INR 1.2       APTT:   Recent Labs     01/30/25  1111   APTT 35.8*       Fasting Lipid Panel:    No results found for: \"CHOL\", \"TRIG\", \"HDL\"    Cardiac Enzymes:    Lab Results   Component Value Date    CKTOTAL 35 06/06/2023    TROPONINI <0.01 07/29/2020    TROPONINI <0.01 04/04/2018    TROPONINI <0.01 01/20/2017       Imaging Studies:     XR FEMUR LEFT (MIN 2 VIEWS)    Result Date: 1/30/2025  EXAMINATION: XRAY VIEWS OF THE LEFT FEMUR 1/30/2025 6:12 pm COMPARISON: None. HISTORY: ORDERING SYSTEM PROVIDED HISTORY: Pain TECHNOLOGIST PROVIDED HISTORY: Reason for exam:->Pain FINDINGS: There is comminuted subtrochanteric proximal femoral fracture in varus angulation.  Surrounding soft tissue swelling.     Comminuted subtrochanteric proximal femoral fracture in varus angulation.     CT HEAD WO CONTRAST    Result Date: 1/30/2025  EXAMINATION: CT OF THE HEAD WITHOUT CONTRAST  1/30/2025 1:01 pm TECHNIQUE: CT of the head was performed without the administration of intravenous contrast. Automated exposure control, iterative reconstruction, and/or weight based adjustment of the mA/kV was utilized to reduce the radiation dose to as low as reasonably achievable. COMPARISON: 11/02/2020 HISTORY: ORDERING SYSTEM PROVIDED HISTORY: fall TECHNOLOGIST PROVIDED HISTORY: Reason for exam:->fall Has a \"code stroke\" or \"stroke alert\" been called?->No Decision Support Exception - unselect if not a suspected or confirmed emergency medical condition->Emergency Medical Condition (MA) FINDINGS: BRAIN/VENTRICLES: Minimal high density within a sulcus involving the anterior inferior and a region in the superior left frontal lobe could reflect minimal subarachnoid hemorrhage.  There is stable global atrophy.  No shift of midline structures.  No intraparenchymal or intraventricular hemorrhage.  No evidence of subdural hematoma. ORBITS: The

## 2025-01-31 NOTE — PROGRESS NOTES
4 Eyes Skin Assessment     NAME:  Emilee Maciel  YOB: 1929  MEDICAL RECORD NUMBER:  [id number]    The patient is being assessed for  Admission    I agree that at least one RN has performed a thorough Head to Toe Skin Assessment on the patient. ALL assessment sites listed below have been assessed.      Areas assessed by both nurses:    Head, Face, Ears, Shoulders, Back, Chest, Arms, Elbows, Hands, Sacrum. Buttock, Coccyx, Ischium, and Legs. Feet and Heels        Does the Patient have a Wound? No noted wound(s)       Tray Prevention initiated by RN: Yes  Wound Care Orders initiated by RN: Yes    Pressure Injury (Stage 3,4, Unstageable, DTI, NWPT, and Complex wounds) if present, place Wound referral order by RN under : No    New Ostomies, if present place, Ostomy referral order under : No     Nurse 1 eSignature: Electronically signed by Nanette Mcfarlane RN on 1/31/25 at 3:59 PM EST    **SHARE this note so that the co-signing nurse can place an eSignature**    Nurse 2 eSignature: {Esignature:[id number]}

## 2025-01-31 NOTE — CONSULTS
MG tablet TAKE 1 TABLET BY MOUTH DAILY 9/9/24   Chris Márquez MD   mirtazapine (REMERON) 15 MG tablet TAKE 1 TABLET BY MOUTH EVERY NIGHT 7/5/24   Usha Lomeli MD   albuterol (PROVENTIL) (2.5 MG/3ML) 0.083% nebulizer solution Take 3 mLs by nebulization 4 times daily as needed for Wheezing 7/17/23   Usha Lomeli MD   vitamin C (ASCORBIC ACID) 500 MG tablet Take 1 tablet by mouth 2 times daily    Sheryl Anand MD   LORATADINE PO Take 5 mg by mouth daily     Sheryl Anand MD   vitamin B-6 (PYRIDOXINE) 50 MG tablet Take 1 tablet by mouth daily    Sheryl Anand MD   Cyanocobalamin (VITAMIN B 12 PO) Take 1,000 mcg by mouth daily     Sheryl Anand MD   vitamin D (CHOLECALCIFEROL) 1000 UNIT TABS tablet Take 2 tablets by mouth daily    Sheryl Anand MD     No outpatient medications have been marked as taking for the 1/30/25 encounter (Hospital Encounter).     Social History     Socioeconomic History    Marital status:      Spouse name: Not on file    Number of children: 2    Years of education: Not on file    Highest education level: 11th grade   Occupational History    Not on file   Tobacco Use    Smoking status: Never    Smokeless tobacco: Never   Vaping Use    Vaping status: Never Used   Substance and Sexual Activity    Alcohol use: No     Comment: 1 cup of coffee a day     Drug use: No    Sexual activity: Not Currently     Partners: Male   Other Topics Concern    Not on file   Social History Narrative    Not on file     Social Determinants of Health     Financial Resource Strain: Low Risk  (9/6/2024)    Overall Financial Resource Strain (CARDIA)     Difficulty of Paying Living Expenses: Not hard at all   Food Insecurity: No Food Insecurity (9/6/2024)    Hunger Vital Sign     Worried About Running Out of Food in the Last Year: Never true     Ran Out of Food in the Last Year: Never true   Transportation Needs: Unknown (9/6/2024)    PRAPARE - Transportation      Lack of Transportation (Medical): Not on file     Lack of Transportation (Non-Medical): No   Physical Activity: Unknown (12/26/2024)    Exercise Vital Sign     Days of Exercise per Week: 0 days     Minutes of Exercise per Session: Not on file   Stress: No Stress Concern Present (6/28/2021)    [employer] [place] [employer] - Occupational Stress Questionnaire     Feeling of Stress : Only a little   Social Connections: Socially Isolated (6/28/2021)    Social Connection and Isolation Panel [NHANES]     Frequency of Communication with Friends and Family: More than three times a week     Frequency of Social Gatherings with Friends and Family: More than three times a week     Attends Jainism Services: Never     Active Member of Clubs or Organizations: No     Attends Club or Organization Meetings: Never     Marital Status:    Intimate Partner Violence: Not on file   Housing Stability: Unknown (9/6/2024)    Housing Stability Vital Sign     Unable to Pay for Housing in the Last Year: Not on file     Number of Times Moved in the Last Year: Not on file     Homeless in the Last Year: No     No family history on file.    ROS: Demented  VITALS/DRAINS:   VITALS:  BP (!) 144/82   Pulse (!) 104   Temp 97.5 °F (36.4 °C) (Temporal)   Resp 24   Ht 1.651 m (5' 5\")   Wt 56.7 kg (125 lb)   SpO2 97%   BMI 20.80 kg/m²     EXAMINATION: Follows simple commands moves all fours equally very active cachectic looking, history of dementia oriented only to self  Cranial Nerves:  Motor: Moves all fours to command (left leg in frog position due to the fractured hip    Sensory: Grossly intact    Cerebellar:    Gait:    DTRs:      IMAGING STUDIES:  CTA PULMONARY W CONTRAST    Result Date: 1/31/2025  EXAM: CT Angiography Chest With Intravenous Contrast EXAM DATE/TIME: 1/31/2025 11:10 am CLINICAL HISTORY: ORDERING SYSTEM PROVIDED HISTORY: Rule out PE  TECHNOLOGIST PROVIDED HISTORY: Reason for exam:->Rule out PE  Additional

## 2025-01-31 NOTE — CONSULTS
Department of Orthopedic Surgery  Resident Consult Note          Reason for Consult: Fall, left hip pain    HISTORY OF PRESENT ILLNESS:       Patient is a 89 y.o. female who presents with left hip pain following a fall that occurred earlier today.  Patient does have dementia and lives with family in a condo in which family has a pad that alerts him whenever patient gets up to ambulate.  Patient got up and the alarm went off so family went to go see found patient down in the bathroom.  Patient is complaining of left hip pain.  Patient did hit her head.  Patient also complaining of mild right ankle pain.  Denies numbness/tingling/paresthesias.  Denies any other orthopedic complaints at this time.  Patient was found to have a subarachnoid hemorrhage so she was subsequently transferred downtown.  Now resting comfortably in her room.    Patient is a community ambulator with the use of a walker at baseline.  Patient denies any blood thinners.      Past Medical History:        Diagnosis Date    Asthma     COPD (chronic obstructive pulmonary disease) (HCC)     Depression     Hyperlipidemia     Hypertension      Past Surgical History:        Procedure Laterality Date    CATARACT REMOVAL WITH IMPLANT      right eye    EYE SURGERY      HYSTERECTOMY (CERVIX STATUS UNKNOWN)       Current Medications:   Current Facility-Administered Medications: levETIRAcetam (KEPPRA) injection 500 mg, 500 mg, IntraVENous, Q12H  [START ON 1/31/2025] donepezil (ARICEPT) tablet 5 mg, 5 mg, Oral, Daily with breakfast  memantine (NAMENDA) tablet 10 mg, 10 mg, Oral, BID  mirtazapine (REMERON) tablet 15 mg, 15 mg, Oral, Nightly  [START ON 1/31/2025] pantoprazole (PROTONIX) tablet 40 mg, 40 mg, Oral, QAM AC  QUEtiapine (SEROQUEL) tablet 25 mg, 25 mg, Oral, 4x Daily WC  sertraline (ZOLOFT) tablet 100 mg, 100 mg, Oral, Daily  ascorbic acid (VITAMIN C) tablet 500 mg, 500 mg, Oral, BID  sodium chloride flush 0.9 % injection 5-40 mL, 5-40 mL, IntraVENous, 2

## 2025-01-31 NOTE — PROGRESS NOTES
OT SESSION ATTEMPT     Date:2025  Patient Name: Emilee Maciel  MRN: [id number]  : 1929  Room: [address] [place] [address]-A     Occupational therapy orders received/chart review completed and OT session attempted this date:   Per chart, Plan for CMN nail left hip ORIF with ortho surgery.       Will reattempt OT at a later time/date.  Thank you,   Fredy Wiggins OTR/L [id number]

## 2025-01-31 NOTE — DISCHARGE INSTRUCTIONS
TRAUMA SERVICES DISCHARGE INSTRUCTIONS    Call [phone], option 2, for any questions/concerns and for follow-up appointment in *** {DAY/WK/MON/YRS:20513}.    Please follow the instructions checked below:    During the course of your workup, we identified an incidental finding of:  ***  Please follow-up with your primary care provider.    ACTIVITY INSTRUCTIONS  Increase activity as tolerated  No heavy lifting or strenuous activity  Take your incentive spirometer home and use 4-6 times/day   []  No driving until cleared by ***    WOUND/DRESSING INSTRUCTIONS:  You may shower.  No sitting in bath tub, hot tub or swimming until cleared by physician.  Ice to areas of pain for first 24 hours.  Heat to areas of pain after that.  Wash areas of lacerations/abrasions with soap & water.  Rinse well.  Pat dry with clean towel.  Apply thin layer of Bacitracin, Neosporin, or triple antibiotic cream to affected area 2-3 times per day.  Keep wounds clean and dry.   []  Sutures/Staples are to be removed in *** {DAY/WK/MON/YRS:20513}.    MEDICATION INSTRUCTIONS  Take medication as prescribed.  When taking pain medications, you may experience dizziness or drowsiness.  Do not drink alcohol or drive when taking these medications.  You may experience constipation while taking pain medication.  You may take over the counter stool softeners such as docusate (Colace), sennosides S (Senokot-S), or Miralax.   []  You may take Ibuprofen (over the counter) as directed for mild pain.     --You may take up to 800mg every 8 hours for pain, please take with food or milk.   []  You may take acetaminophen (Tylenol) products.  Do NOT take more than 4000mg of Tylenol in 24h.   []  Do not take any other acetaminophen (Tylenol) products if you are taking Percocet or Norco, as these contain Tylenol.   --Do NOT take more than 4000mg of Tylenol in 24h.    OPIOID MEDICATION INSTRUCTIONS  Read the medication guide that is included with your  Clinic: [phone]--press option 2  [facility]  [facility] [place]  [address]

## 2025-01-31 NOTE — PROGRESS NOTES
Hand Trauma Exam    Patient Demographics  Age:     89 y.o.  Sex:     female  Hand dominance: Right  Occupation:  retired    Injury Zone  Flexor zones:  II  Extensor zones:  none      Vessels  Pale capillary beds?        No  Rich's Test pulse exam?   (Must occlude opposite artery while feeling for pulse)  (Use doppler if necessary)  Radial artery pulse Yes  Ulnar artery pulse Yes    Nerves  Is sensation intact?  Median nerve distribution Yes  Radial nerve distribution Yes  Ulnar nerve distribution Yes      Tendons  Is normal finger cascade present?   Yes  Can the patient perform these motions?  Yes  Yes    Yes    Any deficit in wrist, MP, PIP, or DIP flexion or extension?  ----(Isolate each joint when testing)----  Note any deficits below.  Right  Flex Deficit? Ext Deficit? Left Flex Deficit? Ext Deficit?   Thumb wnl wnl Thumb wnl wnl   Index wnl wnl Index wnl wnl   Long wnl wnl Long wnl wnl   Ring wnl wnl Ring wnl wnl   Small wnl wnl Small wnl wnl       Bones  Xray results? N/a    Is there laceration over a fracture site?  N/a    Skin  Is there degloving or inadequate skin left for coverage?  No    Other trauma issues  (e.g. Brain, lung, heart injuries)  SAH, Left hip fx    Harvey Frias DO  1/30/2025  8:29 PM

## 2025-01-31 NOTE — PROGRESS NOTES
Trauma Tertiary Survey    Admit Date: 1/30/2025  Hospital day 1    CC:  Fall    Alcohol pre-screening: patient unresponsive due to effects of Fentanyl and Ativan administered prior to exam   Men: How many times in the past year have you had 5 or more drinks in a day?  Unable to assess as patient does not respond   Women: How many times in the past year have you had 4 or more drinks in a day?  Unable to assess as patient does not respond   How much do you drink on a daily basis? Unable to assess as patient does not respond     Drug Pre-screening:    How many times in the past year have you used a recreational drug or used a prescription medication for non medical reasons?   Unable to assess as patient does not respond     Mood Prescreening:    During the past two weeks, have you been bothered by little interest or pleasure doing things?  Patient does not respond to the questions asked.  During the past two weeks, have you been bothered by feeling down, depressed or hopeless?  Patient does not respond to the questions asked.     Scheduled Meds:   levETIRAcetam  500 mg IntraVENous Q12H    donepezil  5 mg Oral Daily with breakfast    memantine  10 mg Oral BID    mirtazapine  15 mg Oral Nightly    pantoprazole  40 mg Oral QAM AC    QUEtiapine  25 mg Oral 4x Daily WC    sertraline  100 mg Oral Daily    vitamin C  500 mg Oral BID    sodium chloride flush  5-40 mL IntraVENous 2 times per day    ceFAZolin  2,000 mg IntraVENous On Call to OR    acetaminophen  650 mg Oral 4 times per day     Continuous Infusions:   sodium chloride       PRN Meds:sodium chloride flush, sodium chloride, ondansetron **OR** ondansetron, polyethylene glycol, LORazepam    Subjective:     89 y.o. female s/p mechanical GLF with small SAH, Right 2nd digit lac, L hip fx Transferred from [facility].    Objective:   Patient Vitals for the past 8 hrs:   BP Temp Temp src Pulse Resp SpO2   01/31/25 0400 135/83 97.9 °F (36.6 °C) Axillary (!) 106 18 96 %

## 2025-01-31 NOTE — PLAN OF CARE
Called anesthesia to inform of elevated troponin.    Plan: Repeat troponin, EKG, CK, UA    Sussy Gilmore DO  01/31/25  9:02 AM

## 2025-01-31 NOTE — PROGRESS NOTES
[facility] - Family Medicine Inpatient   Resident Progress Note    S:  Hospital day: 1     Brief Synopsis: Emilee Maciel is a 89 y.o. female with a PMH of  dementia who presents with LLE pain. History was obtained from patient and family.  Son is Syed ELIZABETH.  Daughter is Ashley.  She is coming from home.  Patient lives with daughter Ashley, son travels from [place] every 2 weeks to take care of mother.     Prior to admission:  Patient was getting up to use the bathroom when she slipped and fall on her left side.  Since then, patient was brought to [facility] ED.     Patient at baseline uses a walker to ambulate, works with PT once a week at home.  Has been getting worse with balance in the last few months.  Does have 5 different aids that come to the house to help her.  At night, patient aided by her daughter.  Every 2 weeks son travels from [place] to take turns.  Mentation wise, usually can recognize people, make jokes, converse well.  At night, does seem to repeat the phrase \"let me have my freedom\", lately with hitting as well    Overnight/interim:   No acute event overnight.  Patient seen and examined at [facility] in th ED early this morning. Family member present. Patient sleeping, was alert during chest x-ray.  Patient scheduled for surgery this morning. Troponin result elevated. Contacted Anesthesiology. Surgery postponed. Work-up ordered. Previous EKG with ST elevation in inferior leads red by Dr. Rodríguez. Contacted Cardiology, consult placed.  During round, patient seen in CT room. Saturation at 98% on 5L oxygen NC.      Cont meds:    sodium chloride       Scheduled meds:    fentanNYL  25 mcg IntraVENous Once    levETIRAcetam  500 mg IntraVENous Q12H    donepezil  5 mg Oral Daily with breakfast    memantine  10 mg Oral BID    mirtazapine  15 mg Oral Nightly    pantoprazole  40 mg Oral QAM AC    QUEtiapine  25 mg Oral 4x Daily WC    sertraline  100 mg Oral Daily    vitamin C  500 mg Oral BID    sodium chloride flush

## 2025-01-31 NOTE — PROGRESS NOTES
Contacted by family practice that troponin is rising.  Family practice ordered additional work up.  Will postpone surgery until completed.  Dr. Galvin

## 2025-01-31 NOTE — CONSULTS
[place] [id number]                              CONSULTATION      PATIENT NAME: ELICEO HAWKINS                  : 1929  MED REC NO: [id number]                        ROOM: [facility]ID  ACCOUNT NO: [id number]                       ADMIT DATE: 2025  PROVIDER: Dustin Yang DO      CONSULT DATE: 2025    CHIEF COMPLAINT AND HISTORY OF CHIEF COMPLAINT:  This is a pleasant 89-year-old white female who was brought in by family members.  Apparently, the patient does reside by herself.  They do have full-time aide and both son and daughter stay with her.  Apparently, the patient this morning got up to go to the bathroom.  Apparently did have a bed alarm, which did sound and they went in to see her, at which time she was on the toilet.  She was attempting to pull her undergarments down, at which time she apparently fell landing on her left side.  They called the paramedics.  She presented to the hospital here.  Radiographic findings at that time did show evidence of a fracture deformity of the left hip.  It was also noted at this time that a CT of the head did show evidence of a small subdural hematoma.  We were consulted to see her for admission to the hospital here.  This was before the CT came back.  At this time, the patient was alert and responsive.  The patient was seen in the presence of both son and daughter.  From a cardiac standpoint of view, she denies any chest pain.  They deny any resting or exertional dyspnea, orthopnea, paroxysmal nocturnal dyspnea, intermittent claudication, or pretibial edema.  Respiratory wise, the patient does have a history of COPD, but does not wear oxygen at home.  Gastrointestinal wise, her weight is stable.  She does have a fair appetite.  Genitourinary wise, she does have occasional incontinence of urine.  Neurologically, she does follow up with Dr. Márquez for some underlying dementia.  
negative for  chest pain, palpitations  GASTROINTESTINAL:  negative for nausea, vomiting  GENITOURINARY:  negative for frequency, urinary incontinence  HEMATOLOGIC/LYMPHATIC:  negative for bleeding and petechiae  MUSCULOSKELETAL:  positive for  pain  NEUROLOGICAL:  negative for headaches, dizziness  BEHAVIOR/PSYCH:  negative for increased agitation and anxiety    PHYSICAL EXAM:    VITALS:  BP (!) 135/100   Pulse (!) 105   Temp 98.4 °F (36.9 °C) (Temporal)   Resp 18   Wt 56.7 kg (125 lb)   SpO2 98%   BMI 20.80 kg/m²   CONSTITUTIONAL:  awake, alert, cooperative, no apparent distress, and appears stated age  EYES: Lids and lashes normal, pupils equal, round and reactive to light, extra ocular muscles intact, sclera clear, conjunctiva normal  ENT: Normocephalic, without obvious abnormality, atraumatic, external ears without lesions, oral pharynx with moist mucus membranes  NECK: Trachea midline, skin normal  LUNGS: No increased work of breathing, good air exchange  CARDIOVASCULAR: 2+ pulses throughout and capillary Refill less than 2 seconds  ABDOMEN: soft, non-distended, non-tender and no rebound tenderness or guarding  NEUROLOGIC: Awake, alert, oriented to name, place and time. Cranial nerves II-XII are grossly intact. Motor is 5 out of 5 bilaterally. Sensory is intact. Babinski down going  MUSCULOSKELETAL:  left lower extremity:  Skin is intact eventually, left leg shortened externally rotated  Compartments soft and compressible  Patient does report tenderness palpation of the left hip, positive logroll.  Denies any tenderness about the left thigh, knee, leg, ankle, foot.  Patient able to weakly plantarflex dorsiflex the left ankle  +2/4 DP & PT pulses, Brisk Cap refill, Toes warm and perfused  Distal sensation grossly intact to Peroneals, Sural, Saphenous, and tibial nrs     right lower extremity:  Skin is intact circumferentially, no obvious signs of trauma  Compartments soft and compressible  Patient reports

## 2025-01-31 NOTE — PROCEDURES
PROCEDURE NOTE  Date: 1/30/2025   Name: Emilee Maciel  YOB: 1929    LACERATION REPAIR NOTE    Attending:  Dr. Ulloa    Resident: Harvey Frias DO     Anesthesia: 1% Lidocaine without Epinephrine    Laceration #: 1.  Location: R proximal 2nd finger    Length: 3 cm.    The wound area was irrigated with sterile saline, cleansed with povidone iodine and draped in a sterile fashion.  The wound area was anesthetized with Lidocaine 1% without epinephrine.    Wound complexity:    Debridement: None.  Undermining: None.  Wound Margins Revised: no.  Flaps Aligned: yes.  The wound was explored with the following results no foreign body or tendon injury seen.  The wound was closed with 3-0 Ethilon using interrupted sutures.  Dressing:  gauze was placed.    Dr. Ulloa was available for the procedure    Harvey Frias DO  Resident, PGY-1  1/30/2025  8:19 PM

## 2025-01-31 NOTE — CONSULTS
TRAUMA HISTORY & PHYSICAL  Resident   1/30/2025  8:08 PM    Chief Complaint   Patient presents with    Fall         HPI: Emilee Maciel is a 89 y.o. female who presents fas transfer from [facility] after mechanical GLF at approx 9 am this morning. Hx dementia. Lives at [facility] where family and/or aid stays with her. Son states he found patient fallen in bathroom moments after he heard it occur. Workup revealed small SAH and L hip fx. She was transferred to [facility] for neurosurgical evaluation and for further management of her hip fracture. No AC/AP. Takes meloxicam daily.      PRIMARY SURVEY    AIRWAY:   Airway normal  EMS ETT Absent   Noisy respirations Absent   Retractions: Absent   Vomiting/bleeding: Absent     BREATHING:    Midaxillary breath sound left:  Present  Midaxillary breath sound right: Present  Cough sound intensity:  Fair  Respiratory rate: 22  FiO2: 2 liters/min via nasal cannula  SpO2: 93%  SMI: unreliable    CIRCULATION:   Femoral pulse rate: within normal limits  Femoral pulse intensity: present  Palpebral conjunctiva: Pink     BP      P     SpO2       T      F    Patient Vitals for the past 8 hrs:   BP Temp Temp src Pulse Resp SpO2 Height Weight   01/30/25 1804 116/75 -- -- (!) 108 22 -- -- --   01/30/25 1734 120/84 -- -- (!) 108 20 93 % -- --   01/30/25 1704 125/84 -- -- (!) 105 26 96 % -- --   01/30/25 1632 100/70 98.6 °F (37 °C) Oral 99 16 96 % 1.651 m (5' 5\") 56.7 kg (125 lb)       FAST EXAM: Not performed    Central Nervous System    GCS Initial 15 minutes   Eye  Motor  Verbal 4 - Opens eyes on own  6 - Follows simple motor commands  4 - Seems confused, disoriented 4 - Opens eyes on own  6 - Follows simple motor commands  4 - Seems confused, disoriented     Neuromuscular blockade: No  Pupil size:  Left 3 mm    Right 3 mm  Pupil reaction: Yes  Wiggles fingers: Left Yes Right Yes  Wiggles toes: Left Yes    Right Yes    Hand grasp:   Left normal       Right normal  Plantar flexion: Left normal     Right

## 2025-02-01 VITALS
HEIGHT: 65 IN | SYSTOLIC BLOOD PRESSURE: 106 MMHG | TEMPERATURE: 97.1 F | OXYGEN SATURATION: 94 % | HEART RATE: 114 BPM | BODY MASS INDEX: 20.83 KG/M2 | WEIGHT: 125 LBS | DIASTOLIC BLOOD PRESSURE: 64 MMHG | RESPIRATION RATE: 30 BRPM

## 2025-02-01 PROBLEM — Z01.810 PREOP CARDIOVASCULAR EXAM: Status: RESOLVED | Noted: 2025-01-31 | Resolved: 2025-02-01

## 2025-02-01 LAB
ALBUMIN SERPL-MCNC: 4.1 G/DL (ref 3.5–5.2)
ALP SERPL-CCNC: 64 U/L (ref 35–104)
ALT SERPL-CCNC: 10 U/L (ref 0–32)
ANION GAP SERPL CALCULATED.3IONS-SCNC: 12 MMOL/L (ref 7–16)
AST SERPL-CCNC: 16 U/L (ref 0–31)
B.E.: -1.2 MMOL/L (ref -3–3)
BASOPHILS # BLD: 0.06 K/UL (ref 0–0.2)
BASOPHILS NFR BLD: 0 % (ref 0–2)
BILIRUB SERPL-MCNC: 0.4 MG/DL (ref 0–1.2)
BNP SERPL-MCNC: 1250 PG/ML (ref 0–450)
BUN SERPL-MCNC: 33 MG/DL (ref 6–23)
CALCIUM SERPL-MCNC: 8.7 MG/DL (ref 8.6–10.2)
CHLORIDE SERPL-SCNC: 100 MMOL/L (ref 98–107)
CO2 SERPL-SCNC: 24 MMOL/L (ref 22–29)
COHB: 0.7 % (ref 0–1.5)
CREAT SERPL-MCNC: 0.8 MG/DL (ref 0.5–1)
CRITICAL: ABNORMAL
DATE ANALYZED: ABNORMAL
DATE OF COLLECTION: ABNORMAL
EKG ATRIAL RATE: 108 BPM
EKG P AXIS: 74 DEGREES
EKG P-R INTERVAL: 140 MS
EKG Q-T INTERVAL: 340 MS
EKG QRS DURATION: 92 MS
EKG QTC CALCULATION (BAZETT): 455 MS
EKG R AXIS: 77 DEGREES
EKG T AXIS: -148 DEGREES
EKG VENTRICULAR RATE: 108 BPM
EOSINOPHIL # BLD: 0 K/UL (ref 0.05–0.5)
EOSINOPHILS RELATIVE PERCENT: 0 % (ref 0–6)
ERYTHROCYTE [DISTWIDTH] IN BLOOD BY AUTOMATED COUNT: 13.5 % (ref 11.5–15)
GFR, ESTIMATED: 67 ML/MIN/1.73M2
GLUCOSE BLD-MCNC: 201 MG/DL (ref 74–99)
GLUCOSE SERPL-MCNC: 207 MG/DL (ref 74–99)
HCO3: 26.3 MMOL/L (ref 22–26)
HCT VFR BLD AUTO: 33.2 % (ref 34–48)
HGB BLD-MCNC: 10.9 G/DL (ref 11.5–15.5)
HHB: 1.3 % (ref 0–5)
IMM GRANULOCYTES # BLD AUTO: 0.4 K/UL (ref 0–0.58)
IMM GRANULOCYTES NFR BLD: 2 % (ref 0–5)
LAB: ABNORMAL
LACTATE BLDV-SCNC: 2.1 MMOL/L (ref 0.5–2.2)
LYMPHOCYTES NFR BLD: 1.8 K/UL (ref 1.5–4)
LYMPHOCYTES RELATIVE PERCENT: 7 % (ref 20–42)
Lab: 44
MAGNESIUM SERPL-MCNC: 2.4 MG/DL (ref 1.6–2.6)
MCH RBC QN AUTO: 29.5 PG (ref 26–35)
MCHC RBC AUTO-ENTMCNC: 32.8 G/DL (ref 32–34.5)
MCV RBC AUTO: 89.7 FL (ref 80–99.9)
METHB: 0.5 % (ref 0–1.5)
MODE: ABNORMAL
MONOCYTES NFR BLD: 2.23 K/UL (ref 0.1–0.95)
MONOCYTES NFR BLD: 9 % (ref 2–12)
NEUTROPHILS NFR BLD: 83 % (ref 43–80)
NEUTS SEG NFR BLD: 21.56 K/UL (ref 1.8–7.3)
O2 SATURATION: 98.7 % (ref 92–98.5)
O2HB: 97.5 % (ref 94–97)
OPERATOR ID: 2860
PATIENT TEMP: 37 C
PCO2: 57.3 MMHG (ref 35–45)
PH BLOOD GAS: 7.28 (ref 7.35–7.45)
PHOSPHATE SERPL-MCNC: 5.1 MG/DL (ref 2.5–4.5)
PLATELET # BLD AUTO: 311 K/UL (ref 130–450)
PMV BLD AUTO: 10.8 FL (ref 7–12)
PO2: 149.7 MMHG (ref 75–100)
POTASSIUM SERPL-SCNC: 4.7 MMOL/L (ref 3.5–5)
POTASSIUM SERPL-SCNC: 4.78 MMOL/L (ref 3.5–5)
PROT SERPL-MCNC: 6.3 G/DL (ref 6.4–8.3)
RBC # BLD AUTO: 3.7 M/UL (ref 3.5–5.5)
RBC # BLD: ABNORMAL 10*6/UL
SODIUM SERPL-SCNC: 136 MMOL/L (ref 132–146)
SOURCE, BLOOD GAS: ABNORMAL
THB: 11.8 G/DL (ref 11.5–16.5)
TIME ANALYZED: 47
TROPONIN I SERPL HS-MCNC: 74 NG/L (ref 0–9)
WBC OTHER # BLD: 26.1 K/UL (ref 4.5–11.5)

## 2025-02-01 PROCEDURE — 83880 ASSAY OF NATRIURETIC PEPTIDE: CPT

## 2025-02-01 PROCEDURE — 84100 ASSAY OF PHOSPHORUS: CPT

## 2025-02-01 PROCEDURE — 84484 ASSAY OF TROPONIN QUANT: CPT

## 2025-02-01 PROCEDURE — 82962 GLUCOSE BLOOD TEST: CPT

## 2025-02-01 PROCEDURE — 83605 ASSAY OF LACTIC ACID: CPT

## 2025-02-01 PROCEDURE — 2500000003 HC RX 250 WO HCPCS

## 2025-02-01 PROCEDURE — 80053 COMPREHEN METABOLIC PANEL: CPT

## 2025-02-01 PROCEDURE — 6360000002 HC RX W HCPCS

## 2025-02-01 PROCEDURE — 84132 ASSAY OF SERUM POTASSIUM: CPT

## 2025-02-01 PROCEDURE — 99232 SBSQ HOSP IP/OBS MODERATE 35: CPT | Performed by: FAMILY MEDICINE

## 2025-02-01 PROCEDURE — 82805 BLOOD GASES W/O2 SATURATION: CPT

## 2025-02-01 PROCEDURE — 85025 COMPLETE CBC W/AUTO DIFF WBC: CPT

## 2025-02-01 PROCEDURE — 83735 ASSAY OF MAGNESIUM: CPT

## 2025-02-01 PROCEDURE — 2700000000 HC OXYGEN THERAPY PER DAY

## 2025-02-01 RX ORDER — LORAZEPAM 1 MG/1
1 TABLET ORAL
Status: DISCONTINUED | OUTPATIENT
Start: 2025-02-01 | End: 2025-02-01 | Stop reason: HOSPADM

## 2025-02-01 RX ORDER — GLYCOPYRROLATE 0.2 MG/ML
0.2 INJECTION INTRAMUSCULAR; INTRAVENOUS EVERY 4 HOURS PRN
Status: DISCONTINUED | OUTPATIENT
Start: 2025-02-01 | End: 2025-02-01 | Stop reason: HOSPADM

## 2025-02-01 RX ADMIN — MORPHINE SULFATE 2 MG: 2 INJECTION, SOLUTION INTRAMUSCULAR; INTRAVENOUS at 10:27

## 2025-02-01 RX ADMIN — SODIUM BICARBONATE 50 MEQ: 84 INJECTION INTRAVENOUS at 01:00

## 2025-02-01 ASSESSMENT — PAIN SCALES - PAIN ASSESSMENT IN ADVANCED DEMENTIA (PAINAD)
BREATHING: NOISY LABORED BREATHING, LONG PERIODS HYPERVENTILATION, CHEYNE-STOKES RESPIRATIONS
BODYLANGUAGE: TENSE, DISTRESSED PACING, FIDGETING
FACIALEXPRESSION: SMILING OR INEXPRESSIVE
TOTALSCORE: 4
CONSOLABILITY: NO NEED TO CONSOLE
BREATHING: NOISY LABORED BREATHING, LONG PERIODS HYPERVENTILATION, CHEYNE-STOKES RESPIRATIONS
NEGVOCALIZATION: OCCASIONAL MOAN/GROAN, LOW SPEECH, NEGATIVE/DISAPPROVING QUALITY
FACIALEXPRESSION: SMILING OR INEXPRESSIVE
CONSOLABILITY: NO NEED TO CONSOLE
BODYLANGUAGE: TENSE, DISTRESSED PACING, FIDGETING
TOTALSCORE: 3

## 2025-02-01 NOTE — SIGNIFICANT EVENT
Critical Care - Rapid Response Team Note      Date of event: 2/1/2025   Time of event: 1238    Emilee Maciel 89 y.o. year old female   YOB: 1929     PCP:  Usha Lomeli MD   Location: 8402/8402-A   Witnessed? : [x]Yes  [] No  Initial Code status: [] Full  [x] DNR-CCA  []DNR-CC    []Limited  ______________________________________________________________________  Reason for RRT:   Tachypnea , Hypoxia (SpO2<90%), and Altered Mental Status    Chief Complaint for this admission:   Chief Complaint   Patient presents with    Fall       Admit date:  1/30/2025     Admitting Diagnosis: Subarachnoid hemorrhage (HCC) [I60.9]  Elevated troponin [R79.89]  Closed displaced intertrochanteric fracture of left femur, initial encounter ([facility]) [S72.142A]  Closed left hip fracture, initial encounter ([facility]) [S72.002A]      Current Diagnosis: The primary encounter diagnosis was Closed displaced intertrochanteric fracture of left femur, initial encounter ([facility]). Diagnoses of Subarachnoid hemorrhage (HCC) and Elevated troponin were also pertinent to this visit.     Subjective: Ms. Emilee Maciel is a 89 yo female who initially presented to the hospital after a fall. She had LLE pain which brought her to the hospital. CT head showed SAH. XR hip and femur showed left trochanteric fracture. She was admitted and was planned for ORIF tomorrow.     This evening she had a significant change in mental status. She is unresponsive to pain. She has agonal breathing on examination. She has anisocoria L>R, but BL pupil still respond to light. She had a leftward gaze with lefttward beating nystagmus.        Objective:   Initial Assessment on arrival:  Vital signs: Temp: -, BP: 168/102, RR: 26, HR: 104 SpO2:98    Airway and Condition: Unconscious, Pulse present, Breathing, and Pooled Secretions noted    Lungs And Circulation: decreased breath sounds on bilaterally, rhonchi heard in bilateral bases, and abnormal rhythm on monitor : a fib vs  failure, dementia, and concern for worsening ICH    Plan:   DNRCC elected by family  They do not want intubation or further CT scans to evaluate for bleeding  They do not want to proceed with surgery tomorrow  They wish to keep the patient comfortable and are on their way to the hospital  ?    Code status: [] Full  [] DNR-CCA  [x]DNR-CC []Limited    Disposition:  [x] No transfer   [] Transfer to monitor floor  [] Transfer to: [] MICU [] NICU [] CVICU [] SICU    Patient’s family updated:     [x] Yes  [] No   Discussed with:  [x] Critical Care Intensivist: Dr. Roberts      [x] Primary Care Provider: Dr. Stahl      [] Other: ?    Zuleyma Shelley,  PGY-3  2/1/2025 4:02 AM  Attending Physician: Dr Roberts    Attending Attestation Note:    Patient reviewed with resident medical staff.  Labs and imaging were reviewed and discussed.  All questions were answered.    Dave Roberts MD

## 2025-02-01 NOTE — PROGRESS NOTES
Orthopaedic Surgery    Patient with RRT overnight.  She became unresponsive.  Patient's 4 children are currently at bedside.  They understand patient's terminal situation.  They are in agreement for nonoperative treatment for her hip fracture.  Patient's family's wishes are to bring her home with hospice care.  They are waiting for hospice to evaluate them for consultation later today. Orthopedics will sign off.     Electronically Signed By  Mayur Carbone D.O.  2/1/2025  10:03 AM

## 2025-02-01 NOTE — PROGRESS NOTES
RN called and spoke with Hospice for a consult today.  RN spoke with Ashley at answering service.  Hospice is aware that the family wants to take the pt home for home Hospice.

## 2025-02-01 NOTE — CARE COORDINATION
2/1/25 Hospice consult requested.  met with family in room, choice list offered.  Family Hx with [facility] and Hospice and  preferred company per daughter Ashley. Patient's family's wishes are to bring her home with hospice care. Hospice being notified.   Rhonda BERMUDEZ RN-BC  [phone]    104 Call placed to [facility] for referral, awaiting call back.      1125 Recalled [facility].  Spoke with Shelia.  Family contact #s provided.  Faxing referral as requested.

## 2025-02-01 NOTE — PLAN OF CARE
Received call from nursing staff regarding change in patient's status with elevated BP of 163/112, tachypnea with an RR of 32, and tachycardia with an HR sustaining in the 130s.  Cardiology was called and Lopressor 5 mg x 1 was administered with an improvement in HR.  RRT was called due to change in patient's status.  She was placed on a nonrebreather at 15 L/min. due to O2 desaturation.  EKG was obtained and remarkable for sinus tachycardia with frequent PVCs.  [place] labs were ordered per RRT team.  Patient does have dementia at baseline but was found to be unresponsive to pain stimulus upon examination.  Family was called per RRT team, including the son who is POA (Syed) and Sister (Ashley), who declined for patient to be intubated and CODE STATUS was successfully switched to DNR CC.     Blood pressure (!) 168/102, pulse (!) 104, temperature 97.1 °F (36.2 °C), temperature source Axillary, resp. rate 20, height 1.651 m (5' 5\"), weight 56.7 kg (125 lb), SpO2 94%, not currently breastfeeding.    Physical Examination  General - Cachectic and ill-appearing with nonrebreather mask in place.  Respiratory - Increased work of breathing with agonal respirations. Clear breath sounds bilaterally with symmetric air entry.  CVS - Tachycardic. Normal S1 and S2. No appreciated murmurs.   Neuro - GCS of 3 with no verbal or motor response. No spontaneous eye opening. Slightly delayed pupillary reaction to light bilaterally with horizontal nystagmus beats in bilateral pupils. Unable to assess remainder of CN function given patient's mentation.       Plan  - CODE STATUS changed to DNR CC. Invasive treatments are to be stopped at this time and to continue comfort care measures.  Family updated accordingly and are on their way to the hospital.  - Hospice will be consulted first thing in the morning.  - Will hold patient's PO medications at this time given her mentation and inability to swallow.  - Left hip ORIF scheduled on 2/2 with

## 2025-02-01 NOTE — PROGRESS NOTES
Notified cardiology of pts elevated hr  Notified ortho resident of pts pain  Pt unable to swallow  Pt only alert to self  Pts family refusing oral meds due to pt not being alert enough to swallow

## 2025-02-01 NOTE — CODE DOCUMENTATION
Patient had high HR at 130 sustaining -Cardio called and 1x metoprolol ordered. Ortho messaged for pain meds due to pain ans change in status. Morphine 1x ordered. Meds were given and pts HR dropped to the 40's and BP remained very high. O2 began to de stat and requiring non-rebreather. Family med resident messaged and not in the building -were told to call RRT. RRT drs ordered Stat ct of head ordered/ EKG and rainbow of labs ordered at this time. Family being called by the RRT Dr to update them on the patients status and confirm code status to confirm they are okay with intubation for the patient due to age and comorbidity's.

## 2025-02-01 NOTE — PLAN OF CARE
RRT was called for altered mental status and shortness of breath. Dr. Shelley and I responded to the said RRT to assess the patient. At the time of our examination, the patient was unresponsive to pain stimulus and was on a non-rebreather mask at 15 L/min. We called the children, Azalea and Syed, to give an update regarding the patient. We have discussed her current status and treatment plan. According to the POA (Syed), they have discussed and agreed to switching to DNR-CC because that is what the patient wanted. They verbalized understanding of the situation. Code status was changed to DNR-CC. Family medicine resident was informed.    Electronically signed by Jaylen Ruelas MD on 2/1/2025 at 1:15 AM

## 2025-02-01 NOTE — PROGRESS NOTES
[facility] Inpatient   Resident Progress Note  S:  Hospital day: 2     Brief Synopsis: Emilee Maciel is a 89 y.o. female with a PMH of  dementia who presents with LLE pain. History was obtained from patient and family.  Son is Syed ELIZABETH.  Daughter is Ashley.  She is coming from home.  Patient lives with daughter Ashley, son travels from [place] every 2 weeks to take care of mother.     Prior to admission:  Patient was getting up to use the bathroom when she slipped and fall on her left side.  Since then, patient was brought to [facility] ED.     Patient at baseline uses a walker to ambulate, works with PT once a week at home.  Has been getting worse with balance in the last few months.  Does have 5 different aids that come to the house to help her.  At night, patient aided by her daughter.  Every 2 weeks son travels from [place] to take turns.  Mentation wise, usually can recognize people, make jokes, converse well.  At night, does seem to repeat the phrase \"let me have my freedom\", lately with hitting as well    Overnight/interim:   RRT overnight. Refer to Dr. Stahl's note on 2/1/25 for further detailed information. EKG with sinus tachycardia, PVC's, and ST wave changes. No imaging performed. Labs with BNP 1250, WBC 26, Hb 10.9, Troponin 74, and ABG with Resp Acidosis plus metabolic compensation. Updated orders include Hospice consult, Code Status Change to DNR CC, and hold all oral medications given her poor mentation plus swallowing difficulty.   Patient examined at bedside with family present including POA. Family confirms DNR CC code status. Family would like to bring patient home with hospice as soon as possible.   On examination, the patient is ill appearing on 5 L NC Oxygen with increased work of breathing. She is unable to communicate and unarousable to painful stimuli.      I reviewed the patient's past medical and surgical history, Medications and Allergies.    O:  BP (!) 168/102   Pulse (!)

## 2025-02-01 NOTE — PROGRESS NOTES
hydrocephalus. There is no change in moderate dilatation of the ventricles and sulci representing age-appropriate atrophy. There is no change in mild periventricular and subcortical white matter hypodensity representing age-appropriate small vessel ischemia. ORBITS: Again seen are changes of bilateral cataract surgery.  The orbits are otherwise normal in appearance. SINUSES: The visualized paranasal sinuses and mastoid air cells demonstrate no acute abnormality. SOFT TISSUES/SKULL:  No acute abnormality of the visualized skull or soft tissues.     1. No acute intracranial abnormality. 2. The possible left inferior frontal subarachnoid hemorrhage is not evident on today's study and has either resolved or was the result of an artifact. 3. No change in moderate age-appropriate atrophy and mild small vessel ischemia. 4. No sign of closed head injury.     Vascular duplex lower extremity venous right    Result Date: 1/30/2025  EXAMINATION: DUPLEX VENOUS ULTRASOUND OF THE RIGHT LOWER EXTREMITY 1/30/2025 9:21 pm TECHNIQUE: Duplex ultrasound using B-mode/gray scaled imaging and Doppler spectral analysis and color flow was obtained of the deep venous structures of the right extremity. COMPARISON: None. HISTORY: ORDERING SYSTEM PROVIDED HISTORY: Pain right calf TECHNOLOGIST PROVIDED HISTORY: What reading provider will be dictating this exam?->CRC FINDINGS: The visualized veins of the right lower extremity are patent and free of echogenic thrombus. The veins demonstrate good compressibility with normal color flow study and spectral analysis.     No evidence of DVT in the right lower extremity.     XR FEMUR LEFT (MIN 2 VIEWS)    Result Date: 1/30/2025  EXAMINATION: XRAY VIEWS OF THE LEFT FEMUR 1/30/2025 6:12 pm COMPARISON: None. HISTORY: ORDERING SYSTEM PROVIDED HISTORY: Pain TECHNOLOGIST PROVIDED HISTORY: Reason for exam:->Pain FINDINGS: There is comminuted subtrochanteric proximal femoral fracture in varus angulation.   administration of intravenous contrast. Multiplanar reformatted images are provided for review. Automated exposure control, iterative reconstruction, and/or weight based adjustment of the mA/kV was utilized to reduce the radiation dose to as low as reasonably achievable. COMPARISON: 11/02/2024 HISTORY: ORDERING SYSTEM PROVIDED HISTORY: fall TECHNOLOGIST PROVIDED HISTORY: Reason for exam:->fall Decision Support Exception - unselect if not a suspected or confirmed emergency medical condition->Emergency Medical Condition (MA) FINDINGS: BONES/ALIGNMENT: There is no acute fracture or traumatic malalignment. DEGENERATIVE CHANGES: There are multilevel discogenic changes throughout the cervical spine.  There is no evidence of fracture or subluxation. SOFT TISSUES: There is no prevertebral soft tissue swelling.     No acute abnormality of the cervical spine.     XR HIP 2-3 VW W PELVIS LEFT    Result Date: 1/30/2025  EXAMINATION: ONE XRAY VIEW OF THE PELVIS AND TWO XRAY VIEWS LEFT HIP 1/30/2025 1:16 pm COMPARISON: None. HISTORY: ORDERING SYSTEM PROVIDED HISTORY: fall TECHNOLOGIST PROVIDED HISTORY: Reason for exam:->fall FINDINGS: There is comminuted left inter/sub trochanteric fracture in mild varus angulation.  Adjacent soft tissue swelling noted. There are degenerative changes in the visualized lower lumbar spine.     Comminuted left inter/sub trochanteric fracture in mild varus angulation.     XR ANKLE RIGHT (MIN 3 VIEWS)    Result Date: 1/30/2025  EXAMINATION: THREE XRAY VIEWS OF THE RIGHT ANKLE 1/30/2025 1:16 pm COMPARISON: None. HISTORY: ORDERING SYSTEM PROVIDED HISTORY: fall, ankle pain TECHNOLOGIST PROVIDED HISTORY: Reason for exam:->fall, ankle pain FINDINGS: No evidence of acute fracture or dislocation.  Normal alignment of the ankle mortise.  No focal osseous lesion.  No evidence of joint effusion. No focal soft tissue abnormality.  Calcaneal spur.     No acute abnormality of the ankle.     CBC:   Lab Results

## 2025-02-01 NOTE — FLOWSHEET NOTE
25 1313   Attending Physican Contact   Attending Physician Notified Y   Attending Physician Name earle frazier   Post Mortem Checklist   Date of Death 25   Time of Death 1230   Absence of Vital Signs Absence of pulse (apical and radial)   Pronounced By 2 RNs   RN Witness to Absence of Vital Signs sebastian mayberry   RN Witness to Absence of Vital Signs 2 Mary Ann Amin   Who Will Sign Death Certificate Armani [place]   Name of Family Notified of Death Syed & Ashley  (at bedside)    Relationship to Patient Son;Daughter    Phone Number at bedside   Is This a  Case Not applicable   OPO Notified Yes   Date OPO Notified 25   Time OPO Notified 1317   OPO Referral Number [id number]   OPO Accepted No   Family Request Autopsy No   Physician Request Autopsy No   Autopsy Authorization Completed Not applicable   Autopsy Performed at This Hospital Not applicable   Was the Patient in Restraints at Death No   Was the Patient in Restraints in the Last 24 hours No   Was the Patient in Restraints in the Last 7 Days Prior to Time of Death No    Home Notified Yes   Name of  Home [facility]  home    Home Phone Number [phone]   Body Released Yes   Body Picked Up Yes   Body Enroute  Home   Belongings Sent To Family/Family Representative   Belongings Sent  Vision - corrective lenses  (glasses)   Vision - Corrective Lenses Eyeglasses   Notifications   Security/Protective Services Notified No   Clinical Supervisor/Bed Board Notified Yes   Switchboard/EBX Notified Not applicable   Nursing Home Notified Not applicable   Hospice Notified Yes   Spiritual Services Notified No   Primary Care Physician Notified Yes

## 2025-02-01 NOTE — CODE DOCUMENTATION
Daughter stating will need to get ahold of he brother who is the POA. 1x Attempted by Dr with no answer. Got a hold of son the 3rd time and he would like to speak with his sister and call the drs back about the decision to proceed or change to DNR-cc. Awaiting call back  Labs sent/ EKG transmitted

## 2025-02-01 NOTE — CODE DOCUMENTATION
Family has made the decision to change code status to DNR-CC and stop all treatment. Patient will continue as comfort care and hospice will be consulted in the morning. Family med will order comfort medications at this time. All other consulted drs will be notified by RN about the change as well. Family is on the way up to be with the patient at this time.

## 2025-02-03 NOTE — DISCHARGE SUMMARY
Discharge Summary    Emilee Maciel  :  1929  MRN:  [id number]    Admit date:  2025  Discharge date:  2025    Admitting Physician:  Usha Lomeli MD    Discharge Diagnoses:    Patient Active Problem List   Diagnosis    Memory loss    Moderate single current episode of major depressive disorder (HCC)    Posterior vitreous detachment    Senile nuclear sclerosis    Ventricular ectopy    Hyperlipidemia    Hypertension    SVT (supraventricular tachycardia) (HCC)    Abnormal weight loss    Chronic airway obstruction (HCC)    Height loss    Alzheimer's disease, unspecified    Opioid dependence with current use (HCC)    Pain and swelling of lower leg    Asthmatic bronchitis with acute exacerbation    Hemosiderin pigmentation of skin    Peripheral vascular disease, unspecified (HCC)    TIA (transient ischemic attack)    PMR (polymyalgia rheumatica) ([facility])    Diastolic congestive heart failure, unspecified HF chronicity ([facility])    Moderate late onset Alzheimer's dementia without behavioral disturbance, psychotic disturbance, mood disturbance, or anxiety ([facility])    Unspecified convulsions    Other seizures    Closed left hip fracture, initial encounter ([facility])    Laceration of R index finger    Subarachnoid hemorrhage ([facility])    Closed displaced intertrochanteric fracture of left femur ([facility])    Elevated troponin    Pulmonary hypertension ([facility])       Admission Condition:  poor    Discharged Condition:       Hospital Course:  Emilee Maciel is a 89 y.o. female with PMH of dementia who presents with LLE pain. History was obtained from patient and family.  Son is Syed ELIZABETH.  Daughter is Ashley.  She is coming from home.  Patient lives with daughter Ashley, son travels from [place] every 2 weeks to take care of mother.     Prior to admission:  Patient was getting up to use the bathroom when she slipped and fall on her left side.  Since then, patient was brought to [facility] ED.     Patient at baseline uses a walker to

## 2025-02-06 LAB
EKG ATRIAL RATE: 108 BPM
EKG P AXIS: 74 DEGREES
EKG P-R INTERVAL: 140 MS
EKG Q-T INTERVAL: 340 MS
EKG QRS DURATION: 92 MS
EKG QTC CALCULATION (BAZETT): 455 MS
EKG R AXIS: 77 DEGREES
EKG T AXIS: -148 DEGREES
EKG VENTRICULAR RATE: 108 BPM

## 2025-02-25 NOTE — PROGRESS NOTES
Date: 2025       Patient Name: Emilee Mcaiel  : 1929      MRN: [id number]    PT order received and chart reviewed. PT order discontinued as goals of care are for hospice.  Please re order if status or goals change.     Eren Nicole PT, DPT  [id number]       Is This A New Presentation, Or A Follow-Up?: Cyst

## (undated) DEVICE — SHEET,DRAPE,53X77,STERILE: Brand: MEDLINE

## (undated) DEVICE — FRACTURE TABLE: Brand: MEDLINE INDUSTRIES, INC.

## (undated) DEVICE — 6619 2 PTNT ISO SYS INCISE AREA&LT;(&GT;&&LT;)&GT;P: Brand: STERI-DRAPE™ IOBAN™ 2

## (undated) DEVICE — ELECTRODE ES L3IN S STL BLDE INSUL DISP VALLEYLAB EDGE

## (undated) DEVICE — BLADE,STAINLESS-STEEL,10,STRL,DISPOSABLE: Brand: MEDLINE

## (undated) DEVICE — ELECTRODE PT RET AD L9FT HI MOIST COND ADH HYDRGEL CORDED